# Patient Record
Sex: FEMALE | Race: BLACK OR AFRICAN AMERICAN | NOT HISPANIC OR LATINO | Employment: FULL TIME
[De-identification: names, ages, dates, MRNs, and addresses within clinical notes are randomized per-mention and may not be internally consistent; named-entity substitution may affect disease eponyms.]

---

## 2017-07-29 ENCOUNTER — HEALTH MAINTENANCE LETTER (OUTPATIENT)
Age: 42
End: 2017-07-29

## 2017-08-04 ENCOUNTER — OFFICE VISIT (OUTPATIENT)
Dept: FAMILY MEDICINE | Facility: CLINIC | Age: 42
End: 2017-08-04
Payer: COMMERCIAL

## 2017-08-04 ENCOUNTER — RESULT FOLLOW UP (OUTPATIENT)
Dept: FAMILY MEDICINE | Facility: CLINIC | Age: 42
End: 2017-08-04

## 2017-08-04 VITALS
SYSTOLIC BLOOD PRESSURE: 115 MMHG | TEMPERATURE: 97 F | DIASTOLIC BLOOD PRESSURE: 76 MMHG | HEART RATE: 87 BPM | BODY MASS INDEX: 30.95 KG/M2 | HEIGHT: 66 IN | WEIGHT: 192.6 LBS | OXYGEN SATURATION: 99 %

## 2017-08-04 DIAGNOSIS — N76.0 BV (BACTERIAL VAGINOSIS): ICD-10-CM

## 2017-08-04 DIAGNOSIS — Z11.3 SCREEN FOR STD (SEXUALLY TRANSMITTED DISEASE): ICD-10-CM

## 2017-08-04 DIAGNOSIS — B96.89 BV (BACTERIAL VAGINOSIS): ICD-10-CM

## 2017-08-04 DIAGNOSIS — R82.90 ABNORMAL URINE ODOR: ICD-10-CM

## 2017-08-04 DIAGNOSIS — Z12.4 SCREENING FOR MALIGNANT NEOPLASM OF CERVIX: Primary | ICD-10-CM

## 2017-08-04 DIAGNOSIS — N92.6 MISSED MENSES: ICD-10-CM

## 2017-08-04 DIAGNOSIS — Z13.6 CARDIOVASCULAR SCREENING; LDL GOAL LESS THAN 160: ICD-10-CM

## 2017-08-04 DIAGNOSIS — Z00.00 ROUTINE HISTORY AND PHYSICAL EXAMINATION OF ADULT: ICD-10-CM

## 2017-08-04 LAB
ALBUMIN UR-MCNC: NEGATIVE MG/DL
APPEARANCE UR: CLEAR
BACTERIA #/AREA URNS HPF: ABNORMAL /HPF
BETA HCG QUAL IFA URINE: NEGATIVE
BILIRUB UR QL STRIP: NEGATIVE
COLOR UR AUTO: YELLOW
GLUCOSE UR STRIP-MCNC: NEGATIVE MG/DL
HGB UR QL STRIP: ABNORMAL
KETONES UR STRIP-MCNC: NEGATIVE MG/DL
LEUKOCYTE ESTERASE UR QL STRIP: NEGATIVE
MICRO REPORT STATUS: ABNORMAL
NITRATE UR QL: NEGATIVE
NON-SQ EPI CELLS #/AREA URNS LPF: ABNORMAL /LPF
PH UR STRIP: 6.5 PH (ref 5–7)
RBC #/AREA URNS AUTO: ABNORMAL /HPF (ref 0–2)
SP GR UR STRIP: 1.01 (ref 1–1.03)
SPECIMEN SOURCE: ABNORMAL
URN SPEC COLLECT METH UR: ABNORMAL
UROBILINOGEN UR STRIP-ACNC: 0.2 EU/DL (ref 0.2–1)
WBC #/AREA URNS AUTO: ABNORMAL /HPF (ref 0–2)
WET PREP SPEC: ABNORMAL

## 2017-08-04 PROCEDURE — 99212 OFFICE O/P EST SF 10 MIN: CPT | Mod: 25 | Performed by: NURSE PRACTITIONER

## 2017-08-04 PROCEDURE — 99396 PREV VISIT EST AGE 40-64: CPT | Performed by: NURSE PRACTITIONER

## 2017-08-04 PROCEDURE — G0145 SCR C/V CYTO,THINLAYER,RESCR: HCPCS | Performed by: NURSE PRACTITIONER

## 2017-08-04 PROCEDURE — 87591 N.GONORRHOEAE DNA AMP PROB: CPT | Performed by: NURSE PRACTITIONER

## 2017-08-04 PROCEDURE — 87491 CHLMYD TRACH DNA AMP PROBE: CPT | Performed by: NURSE PRACTITIONER

## 2017-08-04 PROCEDURE — 84703 CHORIONIC GONADOTROPIN ASSAY: CPT | Performed by: NURSE PRACTITIONER

## 2017-08-04 PROCEDURE — 87210 SMEAR WET MOUNT SALINE/INK: CPT | Performed by: NURSE PRACTITIONER

## 2017-08-04 PROCEDURE — 87624 HPV HI-RISK TYP POOLED RSLT: CPT | Performed by: NURSE PRACTITIONER

## 2017-08-04 PROCEDURE — 81001 URINALYSIS AUTO W/SCOPE: CPT | Performed by: NURSE PRACTITIONER

## 2017-08-04 RX ORDER — METRONIDAZOLE 500 MG/1
500 TABLET ORAL 2 TIMES DAILY
Qty: 14 TABLET | Refills: 0 | Status: SHIPPED | OUTPATIENT
Start: 2017-08-04 | End: 2018-12-21

## 2017-08-04 NOTE — PATIENT INSTRUCTIONS
Based on your medical history and these are the current health maintenance or preventive care services that you are due for (some may have been done at this visit)  Health Maintenance Due   Topic Date Due     PAP Q3 YR  02/20/2017         At [facility], we strive to deliver an exceptional experience to you, every time we see you.    If you receive a survey in the mail, please send us back your thoughts. We really do value your feedback.    Your care team's suggested websites for health information:  Www.[facility].org : Up to date and easily searchable information on multiple topics.  Www.medlineplus.gov : medication info, interactive tutorials, watch real surgeries online  Www.familydoctor.org : good info from the Academy of Family Physicians  Www.cdc.gov : public health info, travel advisories, epidemics (H1N1)  Www.aap.org : children's health info, normal development, vaccinations  Www.health.[facility].[place].us : [place] dept of health, public health issues in [place], N1N1    How to contact your care team:   [facility] [phone]         Pharmacy [phone]    Dr. Rodarte, Oliva Barrow PA-C, Dr. Jimenez, Ani LEVINE CNP, Luanne Ross PA-C, Dr. Castellanos, and ABNER Styles CNP    Team RNs: Mandy Morales      Clinic hours  M-Th 7 am-7 pm   Fri 7 am-5 pm.   Urgent care M-F 11 am-9 pm,   Sat/Sun 9 am-5 pm.  Pharmacy M-Th 8 am-8 pm Fri 8 am-6 pm  Sat/Sun 9 am-5 pm.     All password changes, disabled accounts, or ID changes in [email or web address] will be done by our Access Services Department.    If you need help with your account or password, call: [phone]. Clinic staff no longer has the ability to change passwords.     Preventive Health Recommendations  Female Ages 40 to 49    Yearly exam:     See your health care provider every year in order to  1. Review health changes.   2. Discuss preventive care.    3. Review your medicines if your doctor prescribed any.      Get a  Pap test every three years (unless you have an abnormal result and your provider advises testing more often).      If you get Pap tests with HPV test, you only need to test every 5 years, unless you have an abnormal result. You do not need a Pap test if your uterus was removed (hysterectomy) and you have not had cancer.      You should be tested each year for STDs (sexually transmitted diseases), if you're at risk.       Ask your doctor if you should have a mammogram.      Have a colonoscopy (test for colon cancer) if someone in your family has had colon cancer or polyps before age 50.       Have a cholesterol test every 5 years.       Have a diabetes test (fasting glucose) after age 45. If you are at risk for diabetes, you should have this test every 3 years.    Shots: Get a flu shot each year. Get a tetanus shot every 10 years.     Nutrition:     Eat at least 5 servings of fruits and vegetables each day.    Eat whole-grain bread, whole-wheat pasta and brown rice instead of white grains and rice.    Talk to your provider about Calcium and Vitamin D.     Lifestyle    Exercise at least 150 minutes a week (an average of 30 minutes a day, 5 days a week). This will help you control your weight and prevent disease.    Limit alcohol to one drink per day.    No smoking.     Wear sunscreen to prevent skin cancer.    See your dentist every six months for an exam and cleaning.

## 2017-08-04 NOTE — LETTER
July 23, 2018      Berhane Murray  [address] [place] [address]    Dear MsTristonTrevor,      At [facility], your health and wellness is our primary concern. That is why we are following up on a positive high risk HPV test from 8/4/17. Your provider had recommended that you have a Pap smear and HPV test completed by 8/4/18. Our records do not show that this h17as been scheduled.    It is important to complete the follow up that your provider has suggested for you to ensure that there are no worsening changes which may, over time, develop into cancer.      Please contact our office at  [phone] to schedule an appointment for a Pap smear and HPV test at your earliest convenience. If you have questions or concerns, please call the clinic and we will be happy to assist you.    If you have completed the tests outside of [facility], please have the results forwarded to our office. We will update the chart for your primary Physician to review before your next annual physical.     Thank you for choosing [facility]!    Sincerely,      ABNER Molina CNP/rlm

## 2017-08-04 NOTE — MR AVS SNAPSHOT
After Visit Summary   8/4/2017    Berhane Murray    MRN: [id number]           Patient Information     Date Of Birth          1975        Visit Information        Provider Department      8/4/2017 10:20 AM Estefanía Gregory APRN CNP [facility]        Today's Diagnoses     Screening for malignant neoplasm of cervix    -  1    Routine history and physical examination of adult        Abnormal urine odor        Missed menses        Body mass index 33.0-33.9, adult        Screen for STD (sexually transmitted disease)        CARDIOVASCULAR SCREENING; LDL GOAL LESS THAN 160          Care Instructions    Based on your medical history and these are the current health maintenance or preventive care services that you are due for (some may have been done at this visit)  Health Maintenance Due   Topic Date Due     PAP Q3 YR  02/20/2017         At [facility], we strive to deliver an exceptional experience to you, every time we see you.    If you receive a survey in the mail, please send us back your thoughts. We really do value your feedback.    Your care team's suggested websites for health information:  Www.[employer].org : Up to date and easily searchable information on multiple topics.  Www.medlineplus.gov : medication info, interactive tutorials, watch real surgeries online  Www.familydoctor.org : good info from the Academy of Family Physicians  Www.cdc.gov : public health info, travel advisories, epidemics (H1N1)  Www.aap.org : children's health info, normal development, vaccinations  Www.health.state[place]us : [place] dept of health, public health issues in [place], N1N1    How to contact your care team:   [facility] [phone]         Pharmacy [phone]    Dr. Rodarte, Oliva Barrow PA-C, Dr. Jimenez, Ani LEVINE CNP, Luanne Ross PA-C, Dr. Castellanos, and ABNER Styles CNP    Team RNs: Mandy Morales      Clinic hours  M-Th 7 am-7 pm   Fri 7  am-5 pm.   Urgent care M-F 11 am-9 pm,   Sat/Sun 9 am-5 pm.  Pharmacy M-Th 8 am-8 pm Fri 8 am-6 pm  Sat/Sun 9 am-5 pm.     All password changes, disabled accounts, or ID changes in [email or web address] will be done by our Access Services Department.    If you need help with your account or password, call: [phone]. Clinic staff no longer has the ability to change passwords.     Preventive Health Recommendations  Female Ages 40 to 49    Yearly exam:     See your health care provider every year in order to  1. Review health changes.   2. Discuss preventive care.    3. Review your medicines if your doctor prescribed any.      Get a Pap test every three years (unless you have an abnormal result and your provider advises testing more often).      If you get Pap tests with HPV test, you only need to test every 5 years, unless you have an abnormal result. You do not need a Pap test if your uterus was removed (hysterectomy) and you have not had cancer.      You should be tested each year for STDs (sexually transmitted diseases), if you're at risk.       Ask your doctor if you should have a mammogram.      Have a colonoscopy (test for colon cancer) if someone in your family has had colon cancer or polyps before age 50.       Have a cholesterol test every 5 years.       Have a diabetes test (fasting glucose) after age 45. If you are at risk for diabetes, you should have this test every 3 years.    Shots: Get a flu shot each year. Get a tetanus shot every 10 years.     Nutrition:     Eat at least 5 servings of fruits and vegetables each day.    Eat whole-grain bread, whole-wheat pasta and brown rice instead of white grains and rice.    Talk to your provider about Calcium and Vitamin D.     Lifestyle    Exercise at least 150 minutes a week (an average of 30 minutes a day, 5 days a week). This will help you control your weight and prevent disease.    Limit alcohol to one drink per day.    No smoking.     Wear sunscreen to  prevent skin cancer.    See your dentist every six months for an exam and cleaning.          Follow-ups after your visit        Additional Services     Can Do Program Referral       CAN DO is different from other healthy lifestyle and weight loss programs. CAN DO personal health coaches support you in clarifying what you want for yourself and your well-being. Together we create a roadmap to guide your journey to thriving well-being   one step at a time   for lasting success. Working under the leadership of a physician medical director, our health coaching team includes a doctorate -level prepared expert in health management, a physician and an exercise specialist. We support you in identifying what is important to you, setting meaningful goals and addressing barriers to success. Together we nehal your progress over time. CAN DO has helped hundreds reach their goals: from people who just want to be healthier and enjoy more energy, to those who want to lose or gain weight.                  Your next 10 appointments already scheduled     Aug 18, 2017  2:15 PM CDT   MA SCREENING DIGITAL BILATERAL with [id number]   [facility]    [address] [place] [address] [phone]           Do not use any powder, lotion or deodorant under your arms or on your breast. If you do, we will ask you to remove it before your exam.  Wear comfortable, two-piece clothing.  If you have any allergies, tell your care team.  Bring any previous mammograms from other facilities or have them mailed to the breast center.              Future tests that were ordered for you today     Open Future Orders        Priority Expected Expires Ordered    Glucose Routine  9/4/2017 8/4/2017    Lipid Profile (Chol, Trig, HDL, LDL calc) Routine  9/4/2017 8/4/2017            Who to contact     If you have questions or need follow up information about today's clinic visit or your schedule  "please contact [facility] [place] directly at [phone].  Normal or non-critical lab and imaging results will be communicated to you by MyChart, letter or phone within 4 business days after the clinic has received the results. If you do not hear from us within 7 days, please contact the clinic through MyChart or phone. If you have a critical or abnormal lab result, we will notify you by phone as soon as possible.  Submit refill requests through [employer] or call your pharmacy and they will forward the refill request to us. Please allow 3 business days for your refill to be completed.          Additional Information About Your Visit        [employer] Information     [employer] lets you send messages to your doctor, view your test results, renew your prescriptions, schedule appointments and more. To sign up, go to [email or web address] . Click on \"Log in\" on the left side of the screen, which will take you to the Welcome page. Then click on \"Sign up Now\" on the right side of the page.     You will be asked to enter the access code listed below, as well as some personal information. Please follow the directions to create your username and password.     Your access code is: [email or web address]  Expires: 2017 11:05 AM     Your access code will  in 90 days. If you need help or a new code, please call your [facility] clinic or [phone].        Care EveryWhere ID     This is your Care EveryWhere ID. This could be used by other organizations to access your [facility] medical records  [id number]        Your Vitals Were     Pulse Temperature Height Last Period Pulse Oximetry BMI (Body Mass Index)    87 97  F (36.1  C) (Oral) 5' 5.57\" (1.665 m) 2017 99% 31.5 kg/m2       Blood Pressure from Last 3 Encounters:   17 115/76   16 126/70   14 122/75    Weight from Last 3 Encounters:   17 192 lb 9.6 oz (87.4 kg)   16 195 lb 3.2 oz (88.5 kg)   14 188 lb (85.3 kg)              We " Performed the Following     Beta HCG qual IFA urine     Can Do Program Referral     CHLAMYDIA TRACHOMATIS PCR     NEISSERIA GONORRHOEA PCR     Pap imaged thin layer screen with HPV - recommended age 30 - 65 years (select HPV order below)     UA reflex to Microscopic and Culture     Wet prep        Primary Care Provider Office Phone # Fax #    ABNER Lisa [phone]       [facility] [address]  [facility] [address]        Equal Access to Services     AMIRA VALERO : Hadii aad ku hadasho Soomaali, waaxda luqadaha, qaybta kaalmada adeegyada, waxay idiin hayaan adeeg kharash la'barrera . So [facility] [phone].    ATENCIÓN: Si roderickla español, tiene a lal disposición servicios gratuitos de asistencia lingüística. Llame al [phone].    We comply with applicable federal civil rights laws and [place] laws. We do not discriminate on the basis of race, color, national origin, age, disability sex, sexual orientation or gender identity.            Thank you!     Thank you for choosing [facility]  for your care. Our goal is always to provide you with excellent care. Hearing back from our patients is one way we can continue to improve our services. Please take a few minutes to complete the written survey that you may receive in the mail after your visit with us. Thank you!             Your Updated Medication List - Protect others around you: Learn how to safely use, store and throw away your medicines at www.disposemymeds.org.          This list is accurate as of: 8/4/17 11:05 AM.  Always use your most recent med list.                   Brand Name Dispense Instructions for use Diagnosis    albuterol 108 (90 BASE) MCG/ACT Inhaler    PROAIR HFA/PROVENTIL HFA/VENTOLIN HFA    1 Inhaler    Inhale 2 puffs into the lungs every 6 hours as needed for shortness of breath / dyspnea or wheezing    Bronchitis with bronchospasm

## 2017-08-04 NOTE — NURSING NOTE
"Chief Complaint   Patient presents with     Physical     Not fasting       Initial /76 (BP Location: Left arm, Patient Position: Sitting, Cuff Size: Adult Large)  Pulse 87  Temp 97  F (36.1  C) (Oral)  Ht 5' 5.57\" (1.665 m)  Wt 192 lb 9.6 oz (87.4 kg)  LMP 06/13/2017  SpO2 99%  BMI 31.5 kg/m2 Estimated body mass index is 31.5 kg/(m^2) as calculated from the following:    Height as of this encounter: 5' 5.57\" (1.665 m).    Weight as of this encounter: 192 lb 9.6 oz (87.4 kg).  Medication Reconciliation: complete   Charisse Mitchell MA      "

## 2017-08-04 NOTE — LETTER
[address] [address]  [phone]                                                                                                        August 7, 2017      Berhane Murray  [address] [place] [address]            Dear Berhane,    Your Gonorrhea and Chlamydia screens were both negative.  Continue to use condoms  to help prevent sexually transmitted diseases.  Feel free to contact me with any questions or concerns.  Thank you for allowing me to participate in your care.    Estefanía Gregory  APRN, CNP/ak        Results for orders placed or performed in visit on 08/04/17   Beta HCG qual IFA urine   Result Value Ref Range    Beta HCG Qual IFA Urine Negative NEG   UA reflex to Microscopic and Culture   Result Value Ref Range    Color Urine Yellow     Appearance Urine Clear     Glucose Urine Negative NEG mg/dL    Bilirubin Urine Negative NEG    Ketones Urine Negative NEG mg/dL    Specific Gravity Urine 1.010 1.003 - 1.035    Blood Urine Trace (A) NEG    pH Urine 6.5 5.0 - 7.0 pH    Protein Albumin Urine Negative NEG mg/dL    Urobilinogen Urine 0.2 0.2 - 1.0 EU/dL    Nitrite Urine Negative NEG    Leukocyte Esterase Urine Negative NEG    Source Midstream Urine    Urine Microscopic   Result Value Ref Range    WBC Urine O - 2 0 - 2 /HPF    RBC Urine O - 2 0 - 2 /HPF    Squamous Epithelial /LPF Urine Few FEW /LPF    Bacteria Urine Moderate (A) NEG /HPF   Wet prep   Result Value Ref Range    Specimen Description Vagina     Wet Prep (A)      Clue cells seen  No Trichomonas seen  No yeast seen      Micro Report Status FINAL 08/04/2017    NEISSERIA GONORRHOEA PCR   Result Value Ref Range    Specimen Descrip Cervix     N Gonorrhea PCR  NEG     Negative   Negative for N. gonorrhoeae rRNA by transcription mediated amplification.   A negative result by transcription mediated amplification does not preclude the   presence of N. gonorrhoeae infection because  results are dependent on proper   and adequate collection, absence of inhibitors, and sufficient rRNA to be   detected.     CHLAMYDIA TRACHOMATIS PCR   Result Value Ref Range    Specimen Description Cervix     Chlamydia Trachomatis PCR  NEG     Negative   Negative for C. trachomatis rRNA by transcription mediated amplification.   A negative result by transcription mediated amplification does not preclude the   presence of C. trachomatis infection because results are dependent on proper   and adequate collection, absence of inhibitors, and sufficient rRNA to be   detected.

## 2017-08-04 NOTE — PROGRESS NOTES
SUBJECTIVE:   CC: Berhane Murray is an 42 year old woman who presents for preventive health visit.     Healthy Habits:    Do you get at least three servings of calcium containing foods daily (dairy, green leafy vegetables, etc.)? yes    Amount of exercise or daily activities, outside of work: 7 day(s) per week    Problems taking medications regularly No    Medication side effects: No    Have you had an eye exam in the past two years? no    Do you see a dentist twice per year? yes    Do you have sleep apnea, excessive snoring or daytime drowsiness?no      Concern: Personal - LAST MENSTUAL PERIOD 6/13/17, no menses since then, has done to home HCG's both of which were negative.  Menses have otherwise been normal lasting 4-5 days.   Not using contraception. No abnormal pap history.  Patient notes strong urine odor when voiding, no frequency, urgency, dysuria, no abdominal or flank pain, no fever or chills, also notes some sour smelling vaginal discharge, no dyspareunia, no STD history.    Today's PHQ-2 Score: PHQ-2 ( 1999 Pf[employer]) 8/4/2017 1/20/2016   Q1: Little interest or pleasure in doing things 0 0   Q2: Feeling down, depressed or hopeless 0 0   PHQ-2 Score 0 0         Abuse: Current or Past(Physical, Sexual or Emotional)- No  Do you feel safe in your environment - Yes  Social History   Substance Use Topics     Smoking status: Never Smoker     Smokeless tobacco: Never Used     Alcohol use Yes     The patient does not drink >3 drinks per day nor >7 drinks per week.    Reviewed orders with patient.  Reviewed health maintenance and updated orders accordingly - Yes  Allergies   Allergen Reactions     Prednisone      Fainted after competing full dose.      Recent Labs   Lab Test  02/20/14   1520   ALT  38   CR  0.64   GFRESTIMATED  >90   GFRESTBLACK  >90   POTASSIUM  3.6          Patient under age 50, mutual decision reflected in health maintenance.        Pertinent mammograms are reviewed under the imaging tab.  History  "of abnormal Pap smear: NO - age 30- 65 PAP every 3 years recommended    Reviewed and updated as needed this visit by clinical staffTobacco  Allergies  Meds  Med Hx  Surg Hx  Fam Hx  Soc Hx        Reviewed and updated as needed this visit by Provider        History reviewed. No pertinent past medical history.   History reviewed. No pertinent surgical history.    ROS:  C: NEGATIVE for fever, chills, change in weight  I: NEGATIVE for worrisome rashes, moles or lesions  E: NEGATIVE for vision changes or irritation  ENT: NEGATIVE for ear, mouth and throat problems  R: NEGATIVE for significant cough or SOB  B: NEGATIVE for masses, tenderness or discharge  CV: NEGATIVE for chest pain, palpitations or peripheral edema  GI: NEGATIVE for nausea, abdominal pain, heartburn, or change in bowel habits  : NEGATIVE for unusual urinary or vaginal symptoms. Periods are regular.  M: NEGATIVE for significant arthralgias or myalgia  N: NEGATIVE for weakness, dizziness or paresthesias  P: NEGATIVE for changes in mood or affect    OBJECTIVE:   /76 (BP Location: Left arm, Patient Position: Sitting, Cuff Size: Adult Large)  Pulse 87  Temp 97  F (36.1  C) (Oral)  Ht 5' 5.57\" (1.665 m)  Wt 192 lb 9.6 oz (87.4 kg)  LMP 06/13/2017  SpO2 99%  BMI 31.5 kg/m2  EXAM:  GENERAL: healthy, alert and no distress  EYES: Eyes grossly normal to inspection, PERRL and conjunctivae and sclerae normal  HENT: ear canals and TM's normal, nose and mouth without ulcers or lesions  NECK: no adenopathy, no asymmetry, masses, or scars and thyroid normal to palpation  RESP: lungs clear to auscultation - no rales, rhonchi or wheezes  BREAST: normal without masses, tenderness or nipple discharge and no palpable axillary masses or adenopathy  CV: regular rate and rhythm, normal S1 S2, no S3 or S4, no murmur, click or rub, no peripheral edema and peripheral pulses strong  ABDOMEN: soft, nontender, no hepatosplenomegaly, no masses and bowel sounds " normal   (female): normal female external genitalia, normal urethral meatus , vaginal mucosa pink, moist, well rugated and normal cervix, adnexae, and uterus without masses.  MS: no gross musculoskeletal defects noted, no edema  SKIN: no suspicious lesions or rashes  NEURO: Normal strength and tone, mentation intact and speech normal  PSYCH: mentation appears normal, affect normal/bright  LYMPH: no cervical, supraclavicular, axillary, or inguinal adenopathy    ASSESSMENT/PLAN:   1. Routine history and physical examination of adult    - Glucose; Future    2. Screening for malignant neoplasm of cervix    - Pap imaged thin layer screen with HPV - recommended age 30 - 65 years (select HPV order below)  - Urine Microscopic    3. Missed menses  HCG is negative  - Beta HCG qual IFA urine    4. Abnormal urine odor    - UA reflex to Microscopic and Culture    5. Body mass index 33.0-33.9, adult  Benefits of weight loss reviewed in detail, encouraged her to cut back on the carbohydrates in the diet, consume more fruits and vegetables, drink plenty of water, avoid fruit juices, sodas, get 150 min moderate exercise/week.  Recheck weight in 6 months.    - Can Do Program Referral    6. Screen for STD (sexually transmitted disease)    - Wet prep  - NEISSERIA GONORRHOEA PCR  - CHLAMYDIA TRACHOMATIS PCR    7. CARDIOVASCULAR SCREENING; LDL GOAL LESS THAN 160    - Lipid Profile (Chol, Trig, HDL, LDL calc); Future    8. BV (bacterial vaginosis)    Medication started today, see epic for orders.  Patient is to avoid alcohol while on Flagyl.  We briefly discussed the pathophysiology of bacterial vaginosis and outlined the expected course, the warning symptoms and signs that indicate an atypical course that would need urgent follow up.  Patient education materials given during examination today.    - metroNIDAZOLE (FLAGYL) 500 MG tablet; Take 1 tablet (500 mg) by mouth 2 times daily  Dispense: 14 tablet; Refill: 0    COUNSELING:  "  Reviewed preventive health counseling, as reflected in patient instructions       Regular exercise       Healthy diet/nutrition       Vision screening       Contraception       Osteoporosis Prevention/Bone Health       Safe sex practices/STD prevention       (Greta)menopause management   reports that she has never smoked. She has never used smokeless tobacco.    Estimated body mass index is 31.5 kg/(m^2) as calculated from the following:    Height as of this encounter: 5' 5.57\" (1.665 m).    Weight as of this encounter: 192 lb 9.6 oz (87.4 kg).   Weight management plan: Discussed healthy diet and exercise guidelines and patient will follow up in 12 months in clinic to re-evaluate.    Counseling Resources:  ATP IV Guidelines  Pooled Cohorts Equation Calculator  Breast Cancer Risk Calculator  FRAX Risk Assessment  ICSI Preventive Guidelines  Dietary Guidelines for Americans, 2010  USDA's MyPlate  ASA Prophylaxis  Lung CA Screening    ABNER [facility]  "

## 2017-08-06 LAB
C TRACH DNA SPEC QL NAA+PROBE: NORMAL
N GONORRHOEA DNA SPEC QL NAA+PROBE: NORMAL
SPECIMEN SOURCE: NORMAL
SPECIMEN SOURCE: NORMAL

## 2017-08-08 DIAGNOSIS — Z00.00 ROUTINE HISTORY AND PHYSICAL EXAMINATION OF ADULT: ICD-10-CM

## 2017-08-08 DIAGNOSIS — Z13.6 CARDIOVASCULAR SCREENING; LDL GOAL LESS THAN 160: ICD-10-CM

## 2017-08-08 LAB
CHOLEST SERPL-MCNC: 168 MG/DL
GLUCOSE SERPL-MCNC: 93 MG/DL (ref 70–99)
HDLC SERPL-MCNC: 49 MG/DL
LDLC SERPL CALC-MCNC: 109 MG/DL
NONHDLC SERPL-MCNC: 119 MG/DL
TRIGL SERPL-MCNC: 48 MG/DL

## 2017-08-08 PROCEDURE — 82947 ASSAY GLUCOSE BLOOD QUANT: CPT | Performed by: NURSE PRACTITIONER

## 2017-08-08 PROCEDURE — 80061 LIPID PANEL: CPT | Performed by: NURSE PRACTITIONER

## 2017-08-08 PROCEDURE — 36415 COLL VENOUS BLD VENIPUNCTURE: CPT | Performed by: NURSE PRACTITIONER

## 2017-08-09 ENCOUNTER — NURSE TRIAGE (OUTPATIENT)
Dept: NURSING | Facility: CLINIC | Age: 42
End: 2017-08-09

## 2017-08-09 ENCOUNTER — TELEPHONE (OUTPATIENT)
Dept: NURSING | Facility: CLINIC | Age: 42
End: 2017-08-09

## 2017-08-09 DIAGNOSIS — N92.6 MISSED MENSES: Primary | ICD-10-CM

## 2017-08-09 LAB
COPATH REPORT: NORMAL
PAP: NORMAL

## 2017-08-10 NOTE — TELEPHONE ENCOUNTER
Pt would like lab results.  Notified patient that results will take longer than 24 hours.  Once the MD reviews the labs then they should be accessible in My Chart.  Please call her with lab results.  [phone]  Routed to .    Tita Pike RN  [facility]

## 2017-08-10 NOTE — TELEPHONE ENCOUNTER
Result letter mailed out to pt on 08/09/2017. Called and notified pt of normal lab results.  Pt's concerned about her missed menstrual cycle for two months, she wants to know th next step since all of her labs came back negative. Should she wait it through until September to see if she gets a menstrual cycle/ is it pre-menopausal?  Routing to provider to advise.  Charisse Mitchell MA

## 2017-08-10 NOTE — TELEPHONE ENCOUNTER
Notified pt on providers note below, she requested to speak with provider directly because she has some questions.  Routing to provider to advise.  Charisse Mitchell MA

## 2017-08-10 NOTE — TELEPHONE ENCOUNTER
Pt unable to see results in my chart.  Notified it will take longer than 24 hours.  Routed to .    Tita Pike RN  [facility]

## 2017-08-10 NOTE — TELEPHONE ENCOUNTER
If no menses  Through September, we'll cycle her with some Provera to make her have a cycle.  Estefanía LEVINE, CNP

## 2017-08-11 LAB
FINAL DIAGNOSIS: ABNORMAL
HPV HR 12 DNA CVX QL NAA+PROBE: POSITIVE
HPV16 DNA SPEC QL NAA+PROBE: NEGATIVE
HPV18 DNA SPEC QL NAA+PROBE: NEGATIVE
SPECIMEN DESCRIPTION: ABNORMAL

## 2017-08-14 NOTE — TELEPHONE ENCOUNTER
..Reason for Call:  Other     Detailed comments: Patient called she said she thought she was also having an Thyroid test. She said she has no test results on mychart for thyroid.    Phone Number Patient can be reached at: [phone] (home)    Best Time: anytime    Can we leave a detailed message on this number? YES    Call taken on 8/14/2017 at 12:03 PM by Amairani Fernandez

## 2017-08-14 NOTE — PROGRESS NOTES
08/04/17: NIL pap, + HR HPV (not 16 or 18) result. Plan cotest in 1 year. [email or web address] message sent to the pt with the results and recommendations.   7/23/18 Cotest reminder letter sent ([facility])  12/21/18 NIL pap, neg HPV. Plan: cotest 3 years ([facility])

## 2017-08-15 NOTE — TELEPHONE ENCOUNTER
"Patient did  Not have TSH drawn at last visit ans wants it as hse has not had menses since mid June.  We did HCG on 8/4 and she did 2 home hcg tests priorto her OV 8/4/17 all of which were negative.  Using \"withdrawal method\" for contraception.  TSH and repeat HCG ordered (future).  Patient plans on coming in tomorrow for  Labs and requests a call with pregnancy result.  Estefanía LEVINE, CNP    "

## 2017-08-15 NOTE — TELEPHONE ENCOUNTER
This writer attempted to contact Berhane on 08/15/17      Reason for call of lab orders place and left detailed message.      When patient calls back, please schedule Lab appointment within 2 business days with lab, document that pt called and close encounter .          Harvey Murray, CMA

## 2017-08-16 NOTE — TELEPHONE ENCOUNTER
This writer attempted to contact Berhane on 08/16/17      Reason for call of lab orders place and left detailed message, to call and schedule a lab appointment.      When patient calls back, please schedule Lab appointment within 2 business days with lab, document that pt called and close encounter .      Nicole Wright CMA

## 2017-08-21 DIAGNOSIS — N92.6 MISSED MENSES: ICD-10-CM

## 2017-08-21 LAB
BETA HCG QUAL IFA URINE: NEGATIVE
TSH SERPL DL<=0.005 MIU/L-ACNC: 0.49 MU/L (ref 0.4–4)

## 2017-08-21 PROCEDURE — 36415 COLL VENOUS BLD VENIPUNCTURE: CPT | Performed by: NURSE PRACTITIONER

## 2017-08-21 PROCEDURE — 84443 ASSAY THYROID STIM HORMONE: CPT | Performed by: NURSE PRACTITIONER

## 2017-08-21 PROCEDURE — 84703 CHORIONIC GONADOTROPIN ASSAY: CPT | Performed by: NURSE PRACTITIONER

## 2018-09-09 ENCOUNTER — HEALTH MAINTENANCE LETTER (OUTPATIENT)
Age: 43
End: 2018-09-09

## 2018-09-22 ENCOUNTER — RADIANT APPOINTMENT (OUTPATIENT)
Dept: MAMMOGRAPHY | Facility: CLINIC | Age: 43
End: 2018-09-22
Payer: COMMERCIAL

## 2018-09-22 DIAGNOSIS — Z12.31 VISIT FOR SCREENING MAMMOGRAM: ICD-10-CM

## 2018-09-22 PROCEDURE — 77067 SCR MAMMO BI INCL CAD: CPT | Mod: TC

## 2018-10-07 ENCOUNTER — HEALTH MAINTENANCE LETTER (OUTPATIENT)
Age: 43
End: 2018-10-07

## 2018-10-10 ENCOUNTER — TELEPHONE (OUTPATIENT)
Dept: FAMILY MEDICINE | Facility: CLINIC | Age: 43
End: 2018-10-10

## 2018-10-10 NOTE — TELEPHONE ENCOUNTER
..Reason for Call:  Request for results:    Name of test or procedure: mammogram    Date of test of procedure: 09-    Location of the test or procedure: [facility]    OK to leave the result message on voice mail or with a family member? YES    Phone number Patient can be reached at:  Home number on file [phone] (home)    Additional comments: none    Call taken on 10/10/2018 at 10:18 AM by Jessica Gavin

## 2018-10-10 NOTE — TELEPHONE ENCOUNTER
Pls call tp with the following message:      Your mammogram was normal for you.  You should receive a letter on this soon. It was mailed out today.    Estefanía LEVINE, CNP

## 2018-10-10 NOTE — TELEPHONE ENCOUNTER
This writer attempted to contact patient on 10/10/18      Reason for call results and left detailed message.        Vannessa Jones MA

## 2018-12-21 ENCOUNTER — OFFICE VISIT (OUTPATIENT)
Dept: FAMILY MEDICINE | Facility: CLINIC | Age: 43
End: 2018-12-21
Payer: COMMERCIAL

## 2018-12-21 VITALS
HEIGHT: 63 IN | DIASTOLIC BLOOD PRESSURE: 82 MMHG | TEMPERATURE: 98.4 F | WEIGHT: 200.9 LBS | OXYGEN SATURATION: 100 % | HEART RATE: 65 BPM | BODY MASS INDEX: 35.6 KG/M2 | SYSTOLIC BLOOD PRESSURE: 124 MMHG

## 2018-12-21 DIAGNOSIS — N89.8 VAGINAL ODOR: ICD-10-CM

## 2018-12-21 DIAGNOSIS — R87.810 CERVICAL HIGH RISK HPV (HUMAN PAPILLOMAVIRUS) TEST POSITIVE: ICD-10-CM

## 2018-12-21 DIAGNOSIS — Z00.00 ENCOUNTER FOR ROUTINE ADULT HEALTH EXAMINATION WITHOUT ABNORMAL FINDINGS: Primary | ICD-10-CM

## 2018-12-21 DIAGNOSIS — N76.0 BV (BACTERIAL VAGINOSIS): ICD-10-CM

## 2018-12-21 DIAGNOSIS — B96.89 BV (BACTERIAL VAGINOSIS): ICD-10-CM

## 2018-12-21 DIAGNOSIS — N93.8 DUB (DYSFUNCTIONAL UTERINE BLEEDING): ICD-10-CM

## 2018-12-21 DIAGNOSIS — N95.1 MENOPAUSAL SYNDROME (HOT FLASHES): ICD-10-CM

## 2018-12-21 DIAGNOSIS — Z83.3 FAMILY HISTORY OF DIABETES MELLITUS: ICD-10-CM

## 2018-12-21 LAB
BETA HCG QUAL IFA URINE: NEGATIVE
HBA1C MFR BLD: 5.6 % (ref 0–5.6)
SPECIMEN SOURCE: ABNORMAL
WET PREP SPEC: ABNORMAL

## 2018-12-21 PROCEDURE — 36415 COLL VENOUS BLD VENIPUNCTURE: CPT | Performed by: FAMILY MEDICINE

## 2018-12-21 PROCEDURE — 83001 ASSAY OF GONADOTROPIN (FSH): CPT | Performed by: FAMILY MEDICINE

## 2018-12-21 PROCEDURE — 87624 HPV HI-RISK TYP POOLED RSLT: CPT | Performed by: FAMILY MEDICINE

## 2018-12-21 PROCEDURE — 99213 OFFICE O/P EST LOW 20 MIN: CPT | Mod: 25 | Performed by: FAMILY MEDICINE

## 2018-12-21 PROCEDURE — 87210 SMEAR WET MOUNT SALINE/INK: CPT | Performed by: FAMILY MEDICINE

## 2018-12-21 PROCEDURE — G0145 SCR C/V CYTO,THINLAYER,RESCR: HCPCS | Performed by: FAMILY MEDICINE

## 2018-12-21 PROCEDURE — 80061 LIPID PANEL: CPT | Performed by: FAMILY MEDICINE

## 2018-12-21 PROCEDURE — 99396 PREV VISIT EST AGE 40-64: CPT | Performed by: FAMILY MEDICINE

## 2018-12-21 PROCEDURE — 84703 CHORIONIC GONADOTROPIN ASSAY: CPT | Performed by: FAMILY MEDICINE

## 2018-12-21 PROCEDURE — 83036 HEMOGLOBIN GLYCOSYLATED A1C: CPT | Performed by: FAMILY MEDICINE

## 2018-12-21 PROCEDURE — 84443 ASSAY THYROID STIM HORMONE: CPT | Performed by: FAMILY MEDICINE

## 2018-12-21 RX ORDER — METRONIDAZOLE 500 MG/1
500 TABLET ORAL 2 TIMES DAILY
Qty: 14 TABLET | Refills: 0 | Status: SHIPPED | OUTPATIENT
Start: 2018-12-21 | End: 2020-02-21

## 2018-12-21 ASSESSMENT — MIFFLIN-ST. JEOR: SCORE: 1530.41

## 2018-12-21 NOTE — PROGRESS NOTES
SUBJECTIVE:   Berhane Murray is a 43 year old female who presents to clinic today for the following health issues:      Irregular periods. - has been skipping periods , q 3 months or so in the past year and noticed some hot flashes , wondering if she is getting into perimenopause, her mom went in to menopause in her early 40s , she has a 9 yr old daughter       Duration: couple months    Description (location/character/radiation): Irregular periods    Intensity:  mild    Accompanying signs and symptoms: Heat flashes    History (similar episodes/previous evaluation): None    Precipitating or alleviating factors: None    Therapies tried and outcome: None     2)Wants to check for BV. Has had a hx of BV in the past and now she has been having same symtoms with vag odor   She also fasted and wants her cholesterol checked.   Is due for her PAP as well   Problem list and histories reviewed & adjusted, as indicated.  Additional history: as documented    Patient Active Problem List   Diagnosis     CARDIOVASCULAR SCREENING; LDL GOAL LESS THAN 160     Body mass index 33.0-33.9, adult     Obesity, unspecified obesity severity, unspecified obesity type     Cervical high risk HPV (human papillomavirus) test positive     No past surgical history on file.    Social History     Tobacco Use     Smoking status: Never Smoker     Smokeless tobacco: Never Used   Substance Use Topics     Alcohol use: Yes     No family history on file.      Current Outpatient Medications   Medication Sig Dispense Refill     albuterol (PROAIR HFA, PROVENTIL HFA, VENTOLIN HFA) 108 (90 BASE) MCG/ACT inhaler Inhale 2 puffs into the lungs every 6 hours as needed for shortness of breath / dyspnea or wheezing 1 Inhaler 0     metroNIDAZOLE (FLAGYL) 500 MG tablet Take 1 tablet (500 mg) by mouth 2 times daily (Patient not taking: Reported on 12/21/2018) 14 tablet 0     Allergies   Allergen Reactions     Prednisone      Fainted after competing full dose.      Recent  "Labs   Lab Test 08/21/17  0855 08/08/17  0853 02/20/14  1520   LDL  --  109*  --    HDL  --  49*  --    TRIG  --  48  --    ALT  --   --  38   CR  --   --  0.64   GFRESTIMATED  --   --  >90   GFRESTBLACK  --   --  >90   POTASSIUM  --   --  3.6   TSH 0.49  --   --       BP Readings from Last 3 Encounters:   12/21/18 124/82   08/04/17 115/76   01/20/16 126/70    Wt Readings from Last 3 Encounters:   12/21/18 91.1 kg (200 lb 14.4 oz)   08/04/17 87.4 kg (192 lb 9.6 oz)   01/20/16 88.5 kg (195 lb 3.2 oz)                  Labs reviewed in EPIC    Reviewed and updated as needed this visit by clinical staff       Reviewed and updated as needed this visit by Provider         ROS:  Constitutional, HEENT, cardiovascular, pulmonary, GI, , musculoskeletal, neuro, skin, endocrine and psych systems are negative, except as otherwise noted.    OBJECTIVE:     /82 (BP Location: Right arm, Patient Position: Sitting, Cuff Size: Adult Large)   Pulse 65   Temp 98.4  F (36.9  C) (Oral)   Ht 1.6 m (5' 3\")   Wt 91.1 kg (200 lb 14.4 oz)   SpO2 100%   BMI 35.59 kg/m    Body mass index is 35.59 kg/m .  GENERAL: healthy, alert and no distress  EYES: Eyes grossly normal to inspection, PERRL and conjunctivae and sclerae normal  HENT: ear canals and TM's normal, nose and mouth without ulcers or lesions  NECK: no adenopathy, no asymmetry, masses, or scars and thyroid normal to palpation  RESP: lungs clear to auscultation - no rales, rhonchi or wheezes  CV: regular rate and rhythm, normal S1 S2, no S3 or S4, no murmur, click or rub, no peripheral edema and peripheral pulses strong  ABDOMEN: soft, nontender, no hepatosplenomegaly, no masses and bowel sounds normal   (female): normal female external genitalia, normal urethral meatus, vaginal mucosa, normal cervix/adnexa/uterus without masses or discharge  MS: no gross musculoskeletal defects noted, no edema  SKIN: no suspicious lesions or rashes  NEURO: Normal strength and tone, " mentation intact and speech normal    Diagnostic Test Results:  No results found for this or any previous visit (from the past 24 hour(s)).    ASSESSMENT/PLAN:             1. Encounter for routine adult health examination without abnormal findings  Discussed diet,calcium,exercise.Went over self breast exam.Thin prep was done.Eyes and teeth UTD.No immunizations needed today.See orders below for tests ordered and screening needed.    - Lipid Profile (Chol, Trig, HDL, LDL calc)  - Pap imaged thin layer screen with HPV - recommended age 30 - 65 years (select HPV order below)    2. Menopausal syndrome (hot flashes)  Will check her thyroid and also FSH , discussed symptoms show that she is into perimenopause and usually early n the FSH could be normal or in a variety of ranges , will check .  - TSH with free T4 reflex  - Follicle stimulating hormone  - Hemoglobin A1c    3. Family history of diabetes mellitus  Her dad , sister and PGM have DM type 2 and she needs to check , she denies any symptoms and is fasting today .    4. DUB (dysfunctional uterine bleeding)  As above , most liklely early ginger or jaqueline menopause   - TSH with free T4 reflex  - Follicle stimulating hormone  - Beta HCG Qual, Urine - [facility] and [place] ([id number])    5. Vaginal odor  Will check - she does have BV and I have sent a Rx for the Metronidazole to her pharmacy .  - Wet prep    RTC if no improving or worsening.  Pt is aware  and comfortable with the current plan.      Shabana Toribio MD  [facility]

## 2018-12-22 LAB
CHOLEST SERPL-MCNC: 196 MG/DL
HDLC SERPL-MCNC: 44 MG/DL
LDLC SERPL CALC-MCNC: 136 MG/DL
NONHDLC SERPL-MCNC: 152 MG/DL
TRIGL SERPL-MCNC: 80 MG/DL
TSH SERPL DL<=0.005 MIU/L-ACNC: 0.64 MU/L (ref 0.4–4)

## 2018-12-24 LAB — FSH SERPL-ACNC: 75.1 IU/L

## 2018-12-27 LAB
COPATH REPORT: NORMAL
PAP: NORMAL

## 2018-12-31 LAB
FINAL DIAGNOSIS: NORMAL
HPV HR 12 DNA CVX QL NAA+PROBE: NEGATIVE
HPV16 DNA SPEC QL NAA+PROBE: NEGATIVE
HPV18 DNA SPEC QL NAA+PROBE: NEGATIVE
SPECIMEN DESCRIPTION: NORMAL
SPECIMEN SOURCE CVX/VAG CYTO: NORMAL

## 2019-01-02 PROBLEM — R87.810 CERVICAL HIGH RISK HPV (HUMAN PAPILLOMAVIRUS) TEST POSITIVE: Status: ACTIVE | Noted: 2017-08-04

## 2019-06-18 ENCOUNTER — ANCILLARY PROCEDURE (OUTPATIENT)
Dept: GENERAL RADIOLOGY | Facility: CLINIC | Age: 44
End: 2019-06-18
Attending: PHYSICIAN ASSISTANT
Payer: COMMERCIAL

## 2019-06-18 ENCOUNTER — OFFICE VISIT (OUTPATIENT)
Dept: FAMILY MEDICINE | Facility: CLINIC | Age: 44
End: 2019-06-18
Payer: COMMERCIAL

## 2019-06-18 VITALS
WEIGHT: 203.7 LBS | HEART RATE: 92 BPM | BODY MASS INDEX: 36.09 KG/M2 | RESPIRATION RATE: 18 BRPM | OXYGEN SATURATION: 99 % | TEMPERATURE: 98.1 F | SYSTOLIC BLOOD PRESSURE: 127 MMHG | DIASTOLIC BLOOD PRESSURE: 82 MMHG | HEIGHT: 63 IN

## 2019-06-18 DIAGNOSIS — E66.9 OBESITY, UNSPECIFIED OBESITY SEVERITY, UNSPECIFIED OBESITY TYPE: ICD-10-CM

## 2019-06-18 DIAGNOSIS — R05.9 COUGH: ICD-10-CM

## 2019-06-18 DIAGNOSIS — J01.90 ACUTE SINUSITIS WITH SYMPTOMS > 10 DAYS: Primary | ICD-10-CM

## 2019-06-18 LAB
DEPRECATED S PYO AG THROAT QL EIA: NORMAL
SPECIMEN SOURCE: NORMAL

## 2019-06-18 PROCEDURE — 87081 CULTURE SCREEN ONLY: CPT | Performed by: PHYSICIAN ASSISTANT

## 2019-06-18 PROCEDURE — 71046 X-RAY EXAM CHEST 2 VIEWS: CPT

## 2019-06-18 PROCEDURE — 87880 STREP A ASSAY W/OPTIC: CPT | Performed by: PHYSICIAN ASSISTANT

## 2019-06-18 PROCEDURE — 99214 OFFICE O/P EST MOD 30 MIN: CPT | Performed by: PHYSICIAN ASSISTANT

## 2019-06-18 RX ORDER — GUAIFENESIN AND DEXTROMETHORPHAN HYDROBROMIDE 1200; 60 MG/1; MG/1
1 TABLET, EXTENDED RELEASE ORAL 2 TIMES DAILY
Qty: 28 TABLET | Refills: 0 | Status: SHIPPED | OUTPATIENT
Start: 2019-06-18 | End: 2020-02-21

## 2019-06-18 ASSESSMENT — MIFFLIN-ST. JEOR: SCORE: 1547.07

## 2019-06-18 ASSESSMENT — PAIN SCALES - GENERAL: PAINLEVEL: NO PAIN (0)

## 2019-06-18 NOTE — PATIENT INSTRUCTIONS
Augmentin 875 mg, 1 tablet twice a day for 10 days  Increase fluids  Nasal wash  Mucinex DM 1 tablet twice a day for 10-14 days  Follow up if not better after the antibiotic course.

## 2019-06-18 NOTE — PROGRESS NOTES
Subjective     Berhane Murray is a 43 year old female who presents to clinic today for the following health issues:    HPI   Acute Illness   Acute illness concerns: Cough   Onset: over a month     Fever: no    Chills/Sweats: no    Headache (location?): no    Sinus Pressure:no    Conjunctivitis:  no    Ear Pain: no    Rhinorrhea: no    Congestion: YES    Sore Throat: no     Cough: YES-non-productive    Wheeze: YES    Chest pain: YES     Decreased Appetite: no    Shortness of Breath: YES     Nausea: no    Vomiting: no    Diarrhea:  no    Dysuria/Freq.: no    Fatigue/Achiness: no    Sick/Strep Exposure: no     Therapies Tried and outcome: None        Patient Active Problem List   Diagnosis     CARDIOVASCULAR SCREENING; LDL GOAL LESS THAN 160     Body mass index 33.0-33.9, adult     Obesity, unspecified obesity severity, unspecified obesity type     Cervical high risk HPV (human papillomavirus) test positive     History reviewed. No pertinent surgical history.    Social History     Tobacco Use     Smoking status: Never Smoker     Smokeless tobacco: Never Used   Substance Use Topics     Alcohol use: Yes     Family History   Family history unknown: Yes         Current Outpatient Medications   Medication Sig Dispense Refill     amoxicillin-clavulanate (AUGMENTIN) 875-125 MG tablet Take 1 tablet by mouth 2 times daily for 10 days 20 tablet 0     Dextromethorphan-Guaifenesin  MG TB12 Take 1 tablet by mouth 2 times daily 28 tablet 0     albuterol (PROAIR HFA, PROVENTIL HFA, VENTOLIN HFA) 108 (90 BASE) MCG/ACT inhaler Inhale 2 puffs into the lungs every 6 hours as needed for shortness of breath / dyspnea or wheezing 1 Inhaler 0     metroNIDAZOLE (FLAGYL) 500 MG tablet Take 1 tablet (500 mg) by mouth 2 times daily (Patient not taking: Reported on 6/18/2019) 14 tablet 0     Allergies   Allergen Reactions     Prednisone      Fainted after competing full dose.        Reviewed and updated as needed this visit by Provider  Tobacco  " Allergies  Meds  Problems  Med Hx  Surg Hx  Fam Hx         Review of Systems   ROS COMP: Constitutional, HEENT, cardiovascular, pulmonary, gi and gu systems are negative, except as otherwise noted.      Objective    /82 (BP Location: Right arm, Patient Position: Sitting, Cuff Size: Adult Large)   Pulse 92   Temp 98.1  F (36.7  C) (Oral)   Resp 18   Ht 1.607 m (5' 3.25\")   Wt 92.4 kg (203 lb 11.2 oz)   LMP 05/14/2019   SpO2 99%   Breastfeeding? No   BMI 35.80 kg/m    Body mass index is 35.8 kg/m .     Physical Exam   GENERAL: healthy, alert and no distress  EYES: Eyes grossly normal to inspection, PERRL and conjunctivae and sclerae normal  HENT: normal cephalic/atraumatic, both ears: clear effusion, nose and mouth without ulcers or lesions, nasal mucosa edematous , rhinorrhea clear, oropharynx clear, oral mucous membranes moist, sinuses: maxillary tenderness on bilaterally and postnasal drainage is present  NECK: no adenopathy, no asymmetry, masses, or scars and thyroid normal to palpation  RESP: lungs clear to auscultation - no rales, rhonchi or wheezes  CV: regular rate and rhythm, normal S1 S2, no S3 or S4, no murmur, click or rub, no peripheral edema and peripheral pulses strong  ABDOMEN: soft, nontender, no hepatosplenomegaly, no masses and bowel sounds normal  MS: no gross musculoskeletal defects noted, no edema    Diagnostic Test Results:  Results for orders placed or performed in visit on 06/18/19 (from the past 24 hour(s))   Strep, Rapid Screen   Result Value Ref Range    Specimen Description Throat     Rapid Strep A Screen       NEGATIVE: No Group A streptococcal antigen detected by immunoassay, await culture report.     CXR -independent read- negative- no acute infiltrates or effusion        Assessment & Plan       ICD-10-CM    1. Acute sinusitis with symptoms > 10 days J01.90 Strep, Rapid Screen     amoxicillin-clavulanate (AUGMENTIN) 875-125 MG tablet     " "Dextromethorphan-Guaifenesin  MG TB12   2. Cough R05 XR Chest 2 Views   3. Obesity, unspecified obesity severity, unspecified obesity type E66.9       Augmentin 875 mg, 1 tablet twice a day for 10 days  Increase fluids  Nasal wash  Mucinex DM 1 tablet twice a day for 10-14 days  Follow up if not better after the antibiotic course.   BMI:   Estimated body mass index is 35.8 kg/m  as calculated from the following:    Height as of this encounter: 1.607 m (5' 3.25\").    Weight as of this encounter: 92.4 kg (203 lb 11.2 oz).   Weight management plan: Discussed healthy diet and exercise guidelines            Return in about 1 week (around 6/25/2019), or if symptoms worsen or fail to improve.    Margot Barrow PA-C  [facility]      "

## 2019-06-19 LAB
BACTERIA SPEC CULT: NORMAL
SPECIMEN SOURCE: NORMAL

## 2019-11-04 ENCOUNTER — HEALTH MAINTENANCE LETTER (OUTPATIENT)
Age: 44
End: 2019-11-04

## 2020-02-16 ENCOUNTER — HEALTH MAINTENANCE LETTER (OUTPATIENT)
Age: 45
End: 2020-02-16

## 2020-02-19 ENCOUNTER — TELEPHONE (OUTPATIENT)
Dept: FAMILY MEDICINE | Facility: CLINIC | Age: 45
End: 2020-02-19

## 2020-02-19 DIAGNOSIS — Z12.31 ENCOUNTER FOR SCREENING MAMMOGRAM FOR BREAST CANCER: Primary | ICD-10-CM

## 2020-02-20 NOTE — TELEPHONE ENCOUNTER
"Closing encounter as indicated in message, \"no call back needed\".  [facility]  2nd Floor  Primary Care    "
Order placed.    Airam Rodarte M.D.   
Reason for call:  Order   Order or referral being requested: Mammo   Reason for request: Routine   Date needed: before my next appointment  Has the patient been seen by the PCP for this problem?   No current breast problems. [facility] mammo 2018     Additional comments:please add order to appt    Phone number to reach patient:  Home number on file [phone] (home)    Best Time:  Any- no call back needed    Can we leave a detailed message on this number?  Not Applicable    Travel screening: Negative  
I have reviewed and confirmed nurses' notes for patient's medications, allergies, medical history, and surgical history.

## 2020-02-21 ENCOUNTER — OFFICE VISIT (OUTPATIENT)
Dept: FAMILY MEDICINE | Facility: CLINIC | Age: 45
End: 2020-02-21
Payer: COMMERCIAL

## 2020-02-21 VITALS
HEART RATE: 78 BPM | BODY MASS INDEX: 37.12 KG/M2 | SYSTOLIC BLOOD PRESSURE: 118 MMHG | WEIGHT: 209.5 LBS | HEIGHT: 63 IN | DIASTOLIC BLOOD PRESSURE: 81 MMHG | RESPIRATION RATE: 16 BRPM | OXYGEN SATURATION: 98 %

## 2020-02-21 DIAGNOSIS — R21 RASH: ICD-10-CM

## 2020-02-21 DIAGNOSIS — L85.3 DRY SKIN: ICD-10-CM

## 2020-02-21 DIAGNOSIS — K59.00 CONSTIPATION, UNSPECIFIED CONSTIPATION TYPE: ICD-10-CM

## 2020-02-21 DIAGNOSIS — Z00.00 ENCOUNTER FOR ROUTINE ADULT HEALTH EXAMINATION WITHOUT ABNORMAL FINDINGS: Primary | ICD-10-CM

## 2020-02-21 PROCEDURE — 99213 OFFICE O/P EST LOW 20 MIN: CPT | Mod: 25 | Performed by: FAMILY MEDICINE

## 2020-02-21 PROCEDURE — 99396 PREV VISIT EST AGE 40-64: CPT | Performed by: FAMILY MEDICINE

## 2020-02-21 RX ORDER — PRENATAL VIT 91/IRON/FOLIC/DHA 28-975-200
COMBINATION PACKAGE (EA) ORAL 2 TIMES DAILY
Qty: 14 G | Refills: 0 | Status: SHIPPED | OUTPATIENT
Start: 2020-02-21 | End: 2021-04-02

## 2020-02-21 ASSESSMENT — MIFFLIN-ST. JEOR: SCORE: 1568.38

## 2020-02-21 ASSESSMENT — PAIN SCALES - GENERAL: PAINLEVEL: NO PAIN (0)

## 2020-02-21 NOTE — PROGRESS NOTES
SUBJECTIVE:   CC: Berhane Murray is an 44 year old woman who presents for preventive health visit.     Healthy Habits:     Getting at least 3 servings of Calcium per day:  Yes    Bi-annual eye exam:  Yes    Dental care twice a year:  Yes    Sleep apnea or symptoms of sleep apnea:  Excessive snoring    Diet:  Regular (no restrictions)    Frequency of exercise:  1 day/week    Duration of exercise:  15-30 minutes    Taking medications regularly:  Yes    Barriers to taking medications:  Not applicable    Medication side effects:  None    PHQ-2 Total Score: 0    Additional concerns today:  Yes          1) constipation, on and off , she has had  this problem but currently denies any issues ,  2) rash under the breasts roberto carlos and between the breasts, sometimes itches but discoloration mostly   3) dry skin, seems to be worse on her hands but also body and worse in the winter months , arms legs etc .     Today's PHQ-2 Score:   PHQ-2 ( 1999 Pfizer) 2/21/2020   Q1: Little interest or pleasure in doing things 0   Q2: Feeling down, depressed or hopeless 0   PHQ-2 Score 0   Q1: Little interest or pleasure in doing things Not at all   Q2: Feeling down, depressed or hopeless Not at all   PHQ-2 Score 0       Abuse: Current or Past(Physical, Sexual or Emotional)- No  Do you feel safe in your environment? Yes        Social History     Tobacco Use     Smoking status: Never Smoker     Smokeless tobacco: Never Used   Substance Use Topics     Alcohol use: Yes     If you drink alcohol do you typically have >3 drinks per day or >7 drinks per week? Not applicable    Alcohol Use 2/21/2020   Prescreen: >3 drinks/day or >7 drinks/week? No   Prescreen: >3 drinks/day or >7 drinks/week? -   No flowsheet data found.    Reviewed orders with patient.  Reviewed health maintenance and updated orders accordingly - Yes  Labs reviewed in EPIC  BP Readings from Last 3 Encounters:   02/21/20 118/81   06/18/19 127/82   12/21/18 124/82    Wt Readings from Last 3  Encounters:   02/21/20 95 kg (209 lb 8 oz)   06/18/19 92.4 kg (203 lb 11.2 oz)   12/21/18 91.1 kg (200 lb 14.4 oz)                  Patient Active Problem List   Diagnosis     CARDIOVASCULAR SCREENING; LDL GOAL LESS THAN 160     Body mass index 33.0-33.9, adult     Obesity, unspecified obesity severity, unspecified obesity type     Cervical high risk HPV (human papillomavirus) test positive     No past surgical history on file.    Social History     Tobacco Use     Smoking status: Never Smoker     Smokeless tobacco: Never Used   Substance Use Topics     Alcohol use: Yes     Family History   Family history unknown: Yes         Current Outpatient Medications   Medication Sig Dispense Refill     terbinafine 1 % EX external cream Apply topically 2 times daily 14 g 0     Allergies   Allergen Reactions     Prednisone      Fainted after competing full dose.      Recent Labs   Lab Test 12/21/18  1506 08/21/17  0855 08/08/17  0853 02/20/14  1520   A1C 5.6  --   --   --    *  --  109*  --    HDL 44*  --  49*  --    TRIG 80  --  48  --    ALT  --   --   --  38   CR  --   --   --  0.64   GFRESTIMATED  --   --   --  >90   GFRESTBLACK  --   --   --  >90   POTASSIUM  --   --   --  3.6   TSH 0.64 0.49  --   --         Mammogram Screening: Patient under age 50, mutual decision reflected in health maintenance.      Pertinent mammograms are reviewed under the imaging tab.  History of abnormal Pap smear: NO - age 30- 65 PAP every 3 years recommended  PAP / HPV Latest Ref Rng & Units 12/21/2018 8/4/2017 2/20/2014   PAP - NIL NIL NIL   HPV 16 DNA NEG:Negative Negative Negative -   HPV 18 DNA NEG:Negative Negative Negative -   OTHER HR HPV NEG:Negative Negative Positive(A) -     Reviewed and updated as needed this visit by clinical staff         Reviewed and updated as needed this visit by Provider        Past Medical History:   Diagnosis Date     Cervical high risk HPV (human papillomavirus) test positive 08/04/2017 08/04/17:  NIL pap, + HR HPV (not 16 or 18) result.       No past surgical history on file.    Review of Systems  CONSTITUTIONAL: NEGATIVE for fever, chills, change in weight  INTEGUMENTARU/SKIN: NEGATIVE for worrisome rashes, moles or lesions  EYES: NEGATIVE for vision changes or irritation  ENT: NEGATIVE for ear, mouth and throat problems  RESP: NEGATIVE for significant cough or SOB  BREAST: NEGATIVE for masses, tenderness or discharge  CV: NEGATIVE for chest pain, palpitations or peripheral edema  GI: NEGATIVE for nausea, abdominal pain, heartburn, or change in bowel habits  : NEGATIVE for unusual urinary or vaginal symptoms. Periods are regular.  MUSCULOSKELETAL: NEGATIVE for significant arthralgias or myalgia  NEURO: NEGATIVE for weakness, dizziness or paresthesias  PSYCHIATRIC: NEGATIVE for changes in mood or affect     OBJECTIVE:   There were no vitals taken for this visit.  Physical Exam  GENERAL: healthy, alert and no distress  EYES: Eyes grossly normal to inspection, PERRL and conjunctivae and sclerae normal  HENT: ear canals and TM's normal, nose and mouth without ulcers or lesions  NECK: no adenopathy, no asymmetry, masses, or scars and thyroid normal to palpation  RESP: lungs clear to auscultation - no rales, rhonchi or wheezes  BREAST: normal without masses, tenderness or nipple discharge and no palpable axillary masses or adenopathy  CV: regular rate and rhythm, normal S1 S2, no S3 or S4, no murmur, click or rub, no peripheral edema and peripheral pulses strong  ABDOMEN: soft, nontender, no hepatosplenomegaly, no masses and bowel sounds normal  MS: no gross musculoskeletal defects noted, no edema  SKIN: no suspicious lesions EXCEPT dry skin , hands legs , also yeast like  dermatitis in between the breasts and under the breasts with some darkening spots .  NEURO: Normal strength and tone, mentation intact and speech normal  PSYCH: mentation appears normal, affect normal/bright    Diagnostic Test Results:  Labs  "reviewed in Epic  No results found for any visits on 02/21/20.    ASSESSMENT/PLAN:   1. Encounter for routine adult health examination without abnormal findings  Discussed diet,calcium,exercise.Went over self breast exam.Thin prep was NOT done.Eyes and teeth UTD.No immunizations needed today.See orders below for tests ordered and screening needed.      2. Constipation, unspecified constipation type  Discussed increasing fiber in the diet, lots of water , exercise etc , use Miralax as needed for constipation, if this persists she would need to be seen .    3. Rash  Will use BID for 1 to 2 weeks if not improvement will let me know . Pt is aware  and comfortable with the current plan.    - terbinafine 1 % EX external cream; Apply topically 2 times daily  Dispense: 14 g; Refill: 0    4. Dry skin  Discussed Cetaphil for soap and also Vanicream after showers or baths or daily to prevent dry skin.      COUNSELING:  Reviewed preventive health counseling, as reflected in patient instructions       Regular exercise       Healthy diet/nutrition       Vision screening       Hearing screening       Osteoporosis Prevention/Bone Health    Estimated body mass index is 35.8 kg/m  as calculated from the following:    Height as of 6/18/19: 1.607 m (5' 3.25\").    Weight as of 6/18/19: 92.4 kg (203 lb 11.2 oz).         reports that she has never smoked. She has never used smokeless tobacco.      Counseling Resources:  ATP IV Guidelines  Pooled Cohorts Equation Calculator  Breast Cancer Risk Calculator  FRAX Risk Assessment  ICSI Preventive Guidelines  Dietary Guidelines for Americans, 2010  USDA's MyPlate  ASA Prophylaxis  Lung CA Screening    Shabana Toribio MD  [facility]  "

## 2020-03-03 ENCOUNTER — ANCILLARY PROCEDURE (OUTPATIENT)
Dept: MAMMOGRAPHY | Facility: CLINIC | Age: 45
End: 2020-03-03
Payer: COMMERCIAL

## 2020-03-03 DIAGNOSIS — Z12.31 ENCOUNTER FOR SCREENING MAMMOGRAM FOR BREAST CANCER: ICD-10-CM

## 2020-03-03 PROCEDURE — 77063 BREAST TOMOSYNTHESIS BI: CPT | Mod: TC

## 2020-03-03 PROCEDURE — 77067 SCR MAMMO BI INCL CAD: CPT | Mod: TC

## 2020-03-20 ENCOUNTER — VIRTUAL VISIT (OUTPATIENT)
Dept: FAMILY MEDICINE | Facility: CLINIC | Age: 45
End: 2020-03-20
Payer: COMMERCIAL

## 2020-03-20 ENCOUNTER — VIRTUAL VISIT (OUTPATIENT)
Dept: FAMILY MEDICINE | Facility: OTHER | Age: 45
End: 2020-03-20

## 2020-03-20 DIAGNOSIS — R05.9 COUGH: Primary | ICD-10-CM

## 2020-03-20 PROCEDURE — 99441 ZZC PHYSICIAN TELEPHONE EVALUATION 5-10 MIN: CPT | Performed by: NURSE PRACTITIONER

## 2020-03-20 NOTE — PROGRESS NOTES
"Berhane Murray is a 44 year old female who is being evaluated via a billable telephone visit.      The patient has been notified of following:     \"This telephone visit will be conducted via a call between you and your physician/provider. We have found that certain health care needs can be provided without the need for a physical exam.  This service lets us provide the care you need with a short phone conversation.  If a prescription is necessary we can send it directly to your pharmacy.  If lab work is needed we can place an order for that and you can then stop by our lab to have the test done at a later time.    If during the course of the call the physician/provider feels a telephone visit is not appropriate, you will not be charged for this service.\"     Berhane Murray complains of    Chief Complaint   Patient presents with     Cough       I have reviewed and updated the patient's Past Medical History, Social History, Family History and Medication List.    ALLERGIES  Prednisone    Patient had cough/wheezing last summer and she was seen in , chest film showed RLLL.  She did not know that she had pneumonia but was given Augmentin for a sinus inefction at the time and improved.  Now, she has similar type cough X 6 days - dry, nonproductive,no wheezing,  no sore throat, has slight nasal congestion (clear discharge), no ear pain, facial pressure, headache, fatigue, no fever or chills, cp, shortness of breath, palpitations, nausea or vomiting, appetite remains normal, no rashes,. She does have seasonal allergies, is not taking anything for them presently.  No recent travel, no ill contacts. Patient is asking for a chest x ray to rule out pneumonia.      Assessment/Plan:  1. Cough  Large differential but not likely pneumonia so chest film not indicated at this time.  Advised her to use humidified, air frequent fluids,  Reviewed indications for return phone call for  nw/worsening symptoms.      Phone call duration: 9:31 " minutes    ABNER Molina CNP

## 2020-03-20 NOTE — PATIENT INSTRUCTIONS
At [facility], we strive to deliver an exceptional experience to you, every time we see you. If you receive a survey, please complete it as we do value your feedback.  If you have MyChart, you can expect to receive results automatically within 24 hours of their completion.  Your provider will send a note interpreting your results as well.   If you do not have MyChart, you should receive your results in about a week by mail.    Your care team:                            Family Medicine Internal Medicine   MD London Evans MD Shantel Branch-Fleming, MD Katya Georgiev PA-C Megan Hill, APRLIBERTY Mitchell, MD Pediatrics   Hao Karu, PAJOAN Kahn, MD Ani Maloney APRN CNP   MD Nellie Quintanilla MD Deborah Mielke, MD Kim Thein, APRN [facility]      Clinic hours: Monday - Thursday 7 am-7 pm; Fridays 7 am-5 pm.   Urgent care: Monday - Friday 11 am-9 pm; Saturday and Sunday 9 am-5 pm.    Clinic: [phone]       [facility]: Monday - Thursday 8 am - 7 pm; Friday 8 am - 6 pm  [facility]: [phone]     Use [email or web address] for 24/7 diagnosis and treatment of dozens of conditions.

## 2020-03-20 NOTE — PROGRESS NOTES
"Date: 2020 09:01:33  Clinician: Jenaro Harley  Clinician NPI: [id number]  Patient: Berhane Murray  Patient : 1975  Patient Address: [address] [place] [address] [address], [place] [address]  Patient Phone: [phone]  Visit Protocol: URI  Patient Summary:  Berhane is a 44 year old ( : 1975 ) female who initiated a Visit for COVID-19 (Coronavirus) evaluation and screening. When asked the question \"Please sign me up to receive news, health information and promotions from [employer].\", Berhane responded \"No\".    Berhane states her symptoms started 1-2 days ago.   Her symptoms consist of a cough and nasal congestion.   Symptom details     Nasal secretions: The color of her mucus is clear.    Cough: Berhane coughs a few times an hour and her cough is not more bothersome at night. Phlegm does not come into her throat when she coughs. She believes her cough is caused by post-nasal drip.      Berhane denies having headache, fever, ear pain, rhinitis, enlarged lymph nodes, facial pain or pressure, myalgias, wheezing, sore throat, malaise, chills, and teeth pain. She also denies having recent facial or sinus surgery in the past 60 days and taking antibiotic medication for the symptoms. She is not experiencing dyspnea.   Precipitating events  She has not recently been exposed to someone with influenza. Berhane has not been in close contact with any high risk individuals.   Pertinent COVID-19 (Coronavirus) information  Berhane has not traveled internationally or to the areas where COVID-19 (Coronavirus) is widespread, including cruise ship travel in the last 14 days before the start of her symptoms.   Berhane has not had a close contact with a laboratory-confirmed COVID-19 patient within 14 days of symptom onset. She also has not had a close contact with a suspected COVID-19 patient within 14 days of symptom onset.   Berhane is not a healthcare worker and does not work in a healthcare facility.   Pertinent medical history  Berhane does not get " yeast infections when she takes antibiotics.   Berhane does not need a return to work/school note.   Weight: 205 lbs   Berhane does not smoke or use smokeless tobacco.   She denies pregnancy and denies breastfeeding. Her last period was over a month ago.   Additional information as reported by the patient (free text): Cough and my chart stated I had Pneumonia in June 2019 and I want to confirm it did not return   Weight: 205 lbs    MEDICATIONS: No current medications, ALLERGIES: NKDA  Clinician Response:  Dear Berhane,  I am sorry you are not feeling well. To determine the most appropriate care for you, I would like you to be seen in person to further discuss your health history and symptoms.  You will not be charged for this Visit. Thank you for trusting us with your care.  COVID-19 (Coronavirus) General Information  With the increase in the number of COVID-19 (Coronavirus) cases, we understand you may have some questions. Below is some helpful information on COVID-19 (Coronavirus).  How can I protect myself and others from the COVID-19 (Coronavirus)?  Because there is currently no vaccine to prevent infection, the best way to protect yourself is to avoid being exposed to this virus. Put distance between yourself and other people if COVID-19 (Coronavirus) is spreading in your community. The virus is thought to spread mainly from person-to-person.     Between people who are in close contact with one another (within about 6 about) for a prolonged period (10 minutes or longer).    Through respiratory droplets produced when an infected person coughs or sneezes.     The CDC recommends the following additional steps to protect yourself and others:     Wash your hands often with soap and water for at least 20 seconds, especially after blowing your nose, coughing, or sneezing; going to the bathroom; and before eating or preparing food.  Use an alcohol-based hand  that contains at least 60 percent alcohol if soap and  water are not available.        Avoid touching your eyes, nose and mouth with unwashed hands.    Avoid close contact with people who are sick.    Stay home when you are sick.    Cover your cough or sneeze with a tissue, then throw the tissue in the trash.    Clean and disinfect frequently touched objects and surfaces.     You can help stop COVID-19 (Coronavirus) by knowing the signs and symptoms:     Fever    Cough    Shortness of breath     Contact your healthcare provider if   Develop symptoms   AND   Have been in close contact with a person known to have COVID-19 (Coronavirus) or live in or have recently traveled from an area with ongoing spread of COVID-19 (Coronavirus). Call ahead before you go to a doctor's office or emergency room. Tell them about your recent travel and your symptoms.   For the most up to date information, visit the CDC's website.  Self-monitoring  Self-monitoring means people should monitor themselves for fever by taking their temperatures twice a day and remain alert for a cough or difficulty breathing.  It is important to check your health two times each day for 14 days after a potential exposure to a person with COVID-19 (Coronavirus) or after travel from a location where COVID-19 (Coronavirus) is widespread. If you have been exposed to a person with COVID-19 (Coronavirus), it may take up to 14 days to know if you will get sick. Follow the steps below to check and record your health.     Take your temperature with a thermometer twice a day, once in the morning and once in the evening, and watch for a cough or difficulty breathing for 14 days.    Write down your temperature and any COVID-19 symptoms you may have: feeling feverish, coughing, or difficulty breathing.    Stay home from work or school.    Do not take public transportation, taxis, or ride-shares.    Avoid crowded places (such as shopping centers and movie theaters) and limit your activities in public.    Keep your distance from  others (about 6 feet or 2 meters).    If you get sick with fever, cough, or trouble breathing, contact your healthcare provider and tell them about your recent travel and/or your symptoms.    If you need to seek medical care for other reasons, such as dialysis, call ahead to your doctor and tell them about your recent travel.     Steps to help prevent the spread of COVID-19 (Coronavirus) if you are sick  If you are sick with COVID-19 (Coronavirus) or suspect you are infected with the virus that causes COVID-19 (Coronavirus), follow the steps below to help prevent the disease from spreading&nbsp;to people in your home and community.     Stay home except to get medical care. Home isolation may be started in consultation with your healthcare clinician.    Separate yourself from other people and animals in your home.    Call ahead before visiting your doctor if you have a medical appointment.    Wear a facemask when you are around other people.    Cover your cough and sneezes.    Clean your hands often.    Avoid sharing personal household items.    Clean and disinfect frequently touched objects and surfaces everyday.    You will need to have someone drop off medications or household supplies (if needed) at your house without coming inside or in contact with you or others living in your house.    Monitor your symptoms and seek prompt medical care if your illness is worsening (e.g. Difficulty breathing).    Discontinue home isolation only in consultation with your healthcare provider.     For more detailed and up to date information on what to do if you are sick, visit this link: What to Do If You Are Sick With Coronavirus Disease 2019 (COVID-19).  Do I need to be tested for COVID-19 (Coronavirus)?     At this time, the limited number of available tests are controlled by the state and local health departments and are being reserved for more seriously ill patients, those with known exposure to confirmed patients, and those  "with recent travel (within 14 days) to countries with high rates of COVID-19 (Coronavirus).    Decisions on which patients receive testing will be based on the local spread of COVID-19 (Coronavirus) as well as the symptoms. Your healthcare provider will make the final decision on whether you should be tested.    In the meantime, if you have concerns that you may have been exposed, it is reasonable to practice \"social distancing.\"&nbsp; If you are ill with a cold or flu-like illness, please monitor your symptoms and reach out to your healthcare provider if your symptoms worsen.    For more up to date information, visit this link: COVID-19 (Coronavirus) Frequently Asked Questions and Answers.      Diagnosis: Refer for additional evaluation  Diagnosis ICD: R69  "

## 2020-06-07 ENCOUNTER — OFFICE VISIT (OUTPATIENT)
Dept: URGENT CARE | Facility: URGENT CARE | Age: 45
End: 2020-06-07
Payer: COMMERCIAL

## 2020-06-07 ENCOUNTER — ANCILLARY PROCEDURE (OUTPATIENT)
Dept: GENERAL RADIOLOGY | Facility: CLINIC | Age: 45
End: 2020-06-07
Attending: PHYSICIAN ASSISTANT
Payer: COMMERCIAL

## 2020-06-07 VITALS
OXYGEN SATURATION: 97 % | TEMPERATURE: 98.3 F | DIASTOLIC BLOOD PRESSURE: 82 MMHG | HEART RATE: 77 BPM | SYSTOLIC BLOOD PRESSURE: 124 MMHG | RESPIRATION RATE: 17 BRPM

## 2020-06-07 DIAGNOSIS — J20.9 ACUTE BRONCHITIS WITH SYMPTOMS > 10 DAYS: Primary | ICD-10-CM

## 2020-06-07 DIAGNOSIS — R05.9 COUGH: ICD-10-CM

## 2020-06-07 PROCEDURE — 99214 OFFICE O/P EST MOD 30 MIN: CPT | Performed by: PHYSICIAN ASSISTANT

## 2020-06-07 PROCEDURE — 71046 X-RAY EXAM CHEST 2 VIEWS: CPT

## 2020-06-07 RX ORDER — AZITHROMYCIN 250 MG/1
TABLET, FILM COATED ORAL
Qty: 6 TABLET | Refills: 0 | Status: SHIPPED | OUTPATIENT
Start: 2020-06-07 | End: 2021-04-02

## 2020-06-07 RX ORDER — ALBUTEROL SULFATE 90 UG/1
2 AEROSOL, METERED RESPIRATORY (INHALATION) EVERY 6 HOURS
Qty: 1 INHALER | Refills: 0 | Status: SHIPPED | OUTPATIENT
Start: 2020-06-07 | End: 2023-05-16

## 2020-06-07 ASSESSMENT — ENCOUNTER SYMPTOMS
VOMITING: 0
MUSCULOSKELETAL NEGATIVE: 1
DIARRHEA: 0
NECK STIFFNESS: 0
NAUSEA: 0
SHORTNESS OF BREATH: 0
WOUND: 0
BACK PAIN: 0
PALPITATIONS: 0
NECK PAIN: 0
ARTHRALGIAS: 0
WEAKNESS: 0
LIGHT-HEADEDNESS: 0
COUGH: 1
CARDIOVASCULAR NEGATIVE: 1
CHILLS: 0
EYES NEGATIVE: 1
RHINORRHEA: 0
ENDOCRINE NEGATIVE: 1
DIZZINESS: 0
ALLERGIC/IMMUNOLOGIC NEGATIVE: 1
SORE THROAT: 0
JOINT SWELLING: 0
BRUISES/BLEEDS EASILY: 0
HEMATOLOGIC/LYMPHATIC NEGATIVE: 1
FEVER: 0
HEADACHES: 0
MYALGIAS: 0

## 2020-06-07 NOTE — PROGRESS NOTES
Asked to see for chronic low back pain    Admitted with right sided weakness and facial droop    Complicated past medical history with inpatient hospital stay for most of last year    Describes non-specific low back pain    Mri 8/2017 with ddd and old compression fracture    Was  To have oksana as op but could not schedule appt    Mri of brain 11:8 Small acute/early subacute left striated capsular infarct. No mass  effect or evidence for hemorrhage.    Pt notes pain is better controlled overall but unchanged from yesterday and scores pain at 4-5/10 with goal of 4  Scheduled apap and tramadol    norco 5 mg once yesterday and none today       A  Chronic low back pain   cva with right sided weakness  A fib cad  iddm       D/w pt   No change  Consider duragesic   Increase hs gabapentin     Awake in bed  Lying still  No pain behavior at rest   Denies pain   No palpable tenderness     Visit Vitals  /70 (BP Location: LUE, Patient Position: Semi-Russell's)   Pulse 86   Temp 97.8 °F (36.6 °C) (Oral)   Resp 18   Ht 5' 7\" (1.702 m)   Wt 231 lb 7.7 oz (105 kg)   SpO2 94%   BMI 36.26 kg/m²         Patient Active Problem List   Diagnosis   • Other and unspecified hyperlipidemia   • Type II or unspecified type diabetes mellitus without mention of complication, not stated as uncontrolled   • Obesity   • ASVD (arteriosclerotic vascular disease)   • HTN (hypertension)   • Essential tremor   • BPH (benign prostatic hypertrophy)   • CHRISTIAN (obstructive sleep apnea)   • Prostate cancer (CMS/HCC)   • IDDM (insulin dependent diabetes mellitus) (CMS/HCC)   • Peripheral autonomic neuropathy in disorders classified elsewhere(337.1)   • Dermatophytosis of nail   • Edema   • Unspecified venous (peripheral) insufficiency   • Other hammer toe (acquired)   • Abnormality of gait   • Memory loss   • Other nonspecific abnormal result of function study of brain and central nervous system   • Normal pressure hydrocephalus   • Incontinence of urine   •  Chief Complaint:     Chief Complaint   Patient presents with     Cough     Lingering cough since March. Wanting an x-ray today       HPI: Berhane Murray is an 44 year old female who presents with chest congestion and cough nonproductive, persistent. Patient was sent here via [facility] for further evaluation of symptoms.  Symptoms began 1  months ago and has unchanged.  There is no shortness of breath, wheezing and chest pain.      Patient denies any recent travel or exposure to know COVID positive tested individual.  Patient is not a healthcare worker or .      ROS:     Review of Systems   Constitutional: Negative for chills and fever.   HENT: Positive for congestion. Negative for ear pain, rhinorrhea and sore throat.    Eyes: Negative.    Respiratory: Positive for cough. Negative for shortness of breath.    Cardiovascular: Negative.  Negative for chest pain and palpitations.   Gastrointestinal: Negative for diarrhea, nausea and vomiting.   Endocrine: Negative.    Genitourinary: Negative.    Musculoskeletal: Negative.  Negative for arthralgias, back pain, joint swelling, myalgias, neck pain and neck stiffness.   Skin: Negative.  Negative for rash and wound.   Allergic/Immunologic: Negative.  Negative for immunocompromised state.   Neurological: Negative for dizziness, weakness, light-headedness and headaches.   Hematological: Negative.  Does not bruise/bleed easily.        Respiratory History  occasional episodes of bronchitis       Family History   Family History   Family history unknown: Yes        Problem history  Patient Active Problem List   Diagnosis     CARDIOVASCULAR SCREENING; LDL GOAL LESS THAN 160     Body mass index 33.0-33.9, adult     Obesity, unspecified obesity severity, unspecified obesity type     Cervical high risk HPV (human papillomavirus) test positive        Allergies  Allergies   Allergen Reactions     Prednisone      Fainted after competing full dose.         Social History  Social  History     Socioeconomic History     Marital status: Single     Spouse name: Not on file     Number of children: Not on file     Years of education: Not on file     Highest education level: Not on file   Occupational History     Not on file   Social Needs     Financial resource strain: Not on file     Food insecurity     Worry: Not on file     Inability: Not on file     Transportation needs     Medical: Not on file     Non-medical: Not on file   Tobacco Use     Smoking status: Never Smoker     Smokeless tobacco: Never Used   Substance and Sexual Activity     Alcohol use: Yes     Drug use: No     Sexual activity: Yes     Partners: Male   Lifestyle     Physical activity     Days per week: Not on file     Minutes per session: Not on file     Stress: Not on file   Relationships     Social connections     Talks on phone: Not on file     Gets together: Not on file     Attends Sikh service: Not on file     Active member of club or organization: Not on file     Attends meetings of clubs or organizations: Not on file     Relationship status: Not on file     Intimate partner violence     Fear of current or ex partner: Not on file     Emotionally abused: Not on file     Physically abused: Not on file     Forced sexual activity: Not on file   Other Topics Concern     Parent/sibling w/ CABG, MI or angioplasty before 65F 55M? Not Asked   Social History Narrative     Not on file        Current Meds    Current Outpatient Medications:      albuterol (PROAIR HFA/PROVENTIL HFA/VENTOLIN HFA) 108 (90 Base) MCG/ACT inhaler, Inhale 2 puffs into the lungs every 6 hours, Disp: 1 Inhaler, Rfl: 0     azithromycin (ZITHROMAX) 250 MG tablet, Two tablets first day, then one tablet daily for four days., Disp: 6 tablet, Rfl: 0     terbinafine 1 % EX external cream, Apply topically 2 times daily (Patient not taking: Reported on 3/20/2020), Disp: 14 g, Rfl: 0        OBJECTIVE     Vital signs reviewed by Magan Hoyt PA-C  /82    S/P CABG x 3   • expected acute post op blood loss   • Long term (current) use of anticoagulants   • Nonhealing surgical wound   • Pathologic fracture of vertebrae   • Lumbar spinal stenosis   • Compression deformity of vertebra   • Closed fracture of lumbar vertebra without mention of spinal cord injury   • Sepsis (CMS/[facility])   • Acute UTI   • Cellulitis of left leg   • BP (bullous pemphigoid)   • Bullous pemphigoid   • Lymphedema   • Sepsis (CMS/[facility])   • Sepsis(995.91)   • Chronic pain   • Prostate cancer (CMS/[facility])   • Leukocytosis   • Generalized weakness   • Generalized weakness   • Cellulitis of left leg   • Chronic osteomyelitis of left lower leg with draining sinus (CMS/[facility])   • Atrial fibrillation, chronic (CMS/[facility])   • Skin flap necrosis (CMS/[facility])   • CKD (chronic kidney disease), stage III (CMS/[facility])   • Anemia, chronic disease   • Cholecystitis   • Pleural effusion, left   • Cerebrovascular accident (CVA) (CMS/[facility])   • ESRD (end stage renal disease) (CMS/[facility])   • PFO (patent foramen ovale)   • Ischemic cardiomyopathy     Past Medical History:   Diagnosis Date   • Anesthesia complication     longer to wake   • Arthritis    • ASVD (arteriosclerotic vascular disease)    • Atrial fibrillation (CMS/[facility])    • Blurred vision, bilateral    • Bone infection (CMS/[facility])     osteomylitis left femur - hospitalized from 2/20/17 -> 5/5/2018 at [facility]   • BPH (benign prostatic hypertrophy)    • Bullous pemphigoid    • Chronic pain     lower back pain    • Congestive cardiac failure (CMS/[facility])    • Difficulty waking     12/2013 kypho trouble waking and breathing-admitted to ICU   • Failed moderate sedation during procedure    • Herniated disc    • History of short term memory loss    • HLD (hyperlipidemia)    • HTN (hypertension)    • IDDM (insulin dependent diabetes mellitus) (CMS/[facility])     Checks Blood Sugars QOD   • Inflammatory bowel disease    • Kidney stone     Passed X 1   • MRSA (methicillin resistant Staphylococcus  Pulse 77   Temp 98.3  F (36.8  C)   Resp 17   SpO2 97%      Physical Exam  Vitals signs and nursing note reviewed.   Constitutional:       General: She is not in acute distress.     Appearance: She is well-developed. She is not ill-appearing, toxic-appearing or diaphoretic.   HENT:      Head: Normocephalic and atraumatic.      Right Ear: Hearing, tympanic membrane, ear canal and external ear normal. Tympanic membrane is not perforated, erythematous, retracted or bulging.      Left Ear: Hearing, tympanic membrane, ear canal and external ear normal. Tympanic membrane is not perforated, erythematous, retracted or bulging.      Nose: No mucosal edema, congestion or rhinorrhea.      Right Sinus: No maxillary sinus tenderness or frontal sinus tenderness.      Left Sinus: No maxillary sinus tenderness or frontal sinus tenderness.      Mouth/Throat:      Pharynx: No pharyngeal swelling, oropharyngeal exudate, posterior oropharyngeal erythema or uvula swelling.      Tonsils: No tonsillar exudate or tonsillar abscesses. 0 on the right. 0 on the left.   Eyes:      General:         Right eye: No discharge.         Left eye: No discharge.      Pupils: Pupils are equal, round, and reactive to light.   Neck:      Musculoskeletal: Normal range of motion and neck supple.   Cardiovascular:      Rate and Rhythm: Normal rate and regular rhythm.      Heart sounds: Normal heart sounds. No murmur. No friction rub. No gallop.    Pulmonary:      Effort: Pulmonary effort is normal. No respiratory distress.      Breath sounds: Normal breath sounds. No decreased breath sounds, wheezing, rhonchi or rales.   Chest:      Chest wall: No tenderness.   Abdominal:      General: Bowel sounds are normal. There is no distension.      Palpations: Abdomen is soft. There is no mass.      Tenderness: There is no abdominal tenderness. There is no guarding.   Lymphadenopathy:      Head:      Right side of head: No submental, submandibular, tonsillar,  aureus)    • Normal pressure hydrocephalus     To have Shunt Placed 6/27/2013   • Obesity    • CHRISTIAN (obstructive sleep apnea)      Does not use CPAP   • Peripheral neuropathy    • Pneumonia    • Prostate cancer (CMS/HCC) ~ 2011    Radiation X 38 Treatments   • S/P CABG x 3 7/1/2013   • Tremor    • Tremors of nervous system    • Uncomplicated senile dementia    • Urinary incontinence    • Urinary tract infection    • Wound infection after surgery    • Wound of left leg 02/2018     Past Surgical History:   Procedure Laterality Date   • Cardiac surgery  07/01/2013    CABG   • Colonoscopy  01/01/2009   • Colonoscopy diagnostic  5/21/2013    Colonoscopy, Dx, 3 Polyps   • Coronary angiogram - cv  6/25/2013   • Eye surgery  06/11/2013    left vitrectomy,laser and retina peel   • Eye surgery Left 11/04/2013    left cataract extraction with lens implant   • Eye surgery Right 12/02/2013    right cataract extraction with lens implant   • Hernia repair      Left Inguinal Hernia Repair   • Incision and drainage tibia  12/23/2017   • Joint replacement  ~2010    Lt knee replacement   • Kyphoplasty  12/13/2013    L1 kyphoplasty   • Osteotomy  02/20/2018    debridement, left tibial ostetomy   • Ptca  07/01/1999    W/ Stent   • Service to gastroenterology     • Service to urology     • Skin biopsy     • Skin graft Left 05/15/2018    Left lower leg   • Tibia debridement Left 03/16/2018    left tibia debridement, VAC   • Tibia debridement Left 04/02/2018    left tibia debridement   • Tibia debridement Left 04/23/2018    left tibia debridement     ALLERGIES:   Allergen Reactions   • Sulfa Antibiotics Other (See Comments)     Unknown     Social History     Socioeconomic History   • Marital status: /Civil Union     Spouse name: Not on file   • Number of children: Not on file   • Years of education: Not on file   • Highest education level: Not on file   Social Needs   • Financial resource strain: Not on file   • Food insecurity -  preauricular or posterior auricular adenopathy.      Left side of head: No submental, submandibular, tonsillar, preauricular or posterior auricular adenopathy.      Cervical: No cervical adenopathy.      Right cervical: No superficial or posterior cervical adenopathy.     Left cervical: No superficial or posterior cervical adenopathy.   Skin:     General: Skin is warm and dry.      Findings: No rash.   Neurological:      Mental Status: She is alert and oriented to person, place, and time.      Cranial Nerves: No cranial nerve deficit.      Deep Tendon Reflexes: Reflexes are normal and symmetric.   Psychiatric:         Behavior: Behavior normal. Behavior is cooperative.         Thought Content: Thought content normal.         Judgment: Judgment normal.           Labs:     No results found for any visits on 06/07/20.    Medical Decision Making:    Differential Diagnosis:  URI Adult/Peds:  Bronchitis-viral, Pneumonia and Viral upper respiratory illness        ASSESSMENT    1. Acute bronchitis with symptoms > 10 days    2. Cough        PLAN    Patient presents with 1 month(s) chest congestion and cough nonproductive, persistent.    Patient is in no acute distress.    Temp is 98.3 in clinic today, lung sounds were clear, and O2 sats at 97% on RA.  Imaging to rule out pneumonia was negative for any acute infiltrates or consolidations per my read.  With length of symptoms, Rx for Zithromax today.  Rx for Albuterol inhaler.  Patient encouraged to start OTC allergy medication.  Rest, Push fluids, vaporizer, elevation of head of bed.  Ibuprofen and or Tylenol for any fever or body aches.  Over the counter cough suppressant- PRN- as discussed.   If symptoms worsen, recheck immediately otherwise follow up with your PCP in 1 week if symptoms are not improving.  Worrisome symptoms discussed with instructions to go to the ED.  Patient given COVID isolation instructions.  Patient declined COVID testing today.  Patient verbalized  worry: Not on file   • Food insecurity - inability: Not on file   • Transportation needs - medical: Not on file   • Transportation needs - non-medical: Not on file   Occupational History   • Not on file   Tobacco Use   • Smoking status: Former Smoker     Packs/day: 1.00     Years: 35.00     Pack years: 35.00     Types: Cigarettes     Last attempt to quit: 1983     Years since quittin.8   • Smokeless tobacco: Never Used   Substance and Sexual Activity   • Alcohol use: No     Alcohol/week: 0.0 oz     Comment: 1 X / Month   • Drug use: No   • Sexual activity: Not Currently     Partners: Female   Other Topics Concern   •  Service Not Asked   • Blood Transfusions Not Asked   • Caffeine Concern Not Asked   • Occupational Exposure Not Asked   • Hobby Hazards Not Asked   • Sleep Concern Not Asked   • Stress Concern Not Asked   • Weight Concern Not Asked   • Special Diet Not Asked   • Back Care Not Asked   • Exercise Not Asked   • Bike Helmet Not Asked   • Seat Belt No   • Self-Exams Not Asked   Social History Narrative   • Not on file     Current Facility-Administered Medications   Medication   • sodium chloride (NORMAL SALINE) 0.9 % bolus 100-200 mL   • albumin human (SPA) 25 % injection 12.5 g   • gabapentin (NEURONTIN) capsule 100 mg   • traMADol (ULTRAM) tablet 25 mg   • acetaminophen (TYLENOL) tablet 500 mg   • allopurinol (ZYLOPRIM) tablet 200 mg   • amLODIPine (NORVASC) tablet 10 mg   • aspirin (ECOTRIN) enteric coated tablet 81 mg   • atorvastatin (LIPITOR) tablet 80 mg   • B complex-vitamin C-folic acid (NEPHRO-CARLOS) tablet 0.8 mg   • carvedilol (COREG) tablet 12.5 mg   • cholecalciferol (VITAMIN D3) tablet 1,000 Units   • cloNIDine (CATAPRES) tablet 0.1 mg   • famotidine (PEPCID) tablet 20 mg   • ferrous sulfate (65 mg Fe per 325 mg) tablet 325 mg   • insulin detemir (LEVEMIR) injection 45 Units   • lidocaine (LIDOCARE) 4 % patch 1 patch   • predniSONE (DELTASONE) tablet 10 mg   • tamsulosin  (FLOMAX) capsule 0.4 mg   • acetaminophen (TYLENOL) tablet 650 mg    Or   • acetaminophen (TYLENOL) suppository 650 mg   • sodium chloride (NORMAL SALINE) 0.9 % bolus 500 mL   • heparin (porcine) injection 7,500 Units   • sodium chloride (PF) 0.9 % injection 2 mL   • sodium chloride (PF) 0.9 % injection 2 mL   • dextrose 50 % injection 25 g   • dextrose 5 % infusion   • glucagon (GLUCAGEN) injection 1 mg   • dextrose (GLUTOSE) 40 % gel 15 g   • insulin lispro (HumaLOG) sliding scale injection   • insulin lispro (HumaLOG) injection 10 Units   • lidocaine patch removal   • sodium chloride 0.9 % flush bag 500 mL   • potassium chloride (KLOR-CON M) trinh ER tablet 20 mEq   • potassium chloride (KLOR-CON) packet 20 mEq   • potassium chloride (KLOR-CON M) trinh ER tablet 40 mEq   • potassium chloride (KLOR-CON) packet 40 mEq   • magnesium sulfate 1 g in dextrose 5% 100 mL IVPB premix   • magnesium sulfate 2 g in 50 mL premix IVPB   • magnesium sulfate 2 g in 50 mL premix IVPB   • HYDROmorphone (DILAUDID) injection 0.2-0.5 mg   • HYDROcodone-acetaminophen (NORCO) 5-325 MG per tablet 1-2 tablet   • sodium hypochlorite (DAKIN'S) 0.062 % (1/8 strength) irrigation solution 1 application        understanding and agreed with this plan.    Droplet precautions were observed during this visit.  PPE was worn by me during the visit.  PPE included gown, double gloves, N95 mask, and face shield.  Vital signs were collected by me as well as any NP, or OP swabs if needed.    Greater than 30 minutes was spent in the consultation and direct patient care by me due to PPE use and COVID precautions.     Magan Hoyt PA-C  6/7/2020, 9:48 AM

## 2020-06-07 NOTE — PATIENT INSTRUCTIONS
Your symptoms show that you may have coronavirus (COVID-19). This illness can cause fever, cough and trouble breathing. Many people get a mild case and get better on their own. Some people can get very sick.     Not all patients are tested for COVID-19. If you need to be tested, your care team will let you know.     How can I protect others?    Without a test, we can't know for sure that you have COVID-19. For safety, it's very important to follow these rules.    Stay home and away from others (self-isolate) until:    At least 10 days have passed since your symptoms started. And     You've had no fever--and no medicine that reduces fever--for 3 full days (72 hours). And      Your other symptoms have resolved (gotten better).     During this time:    Stay in your own room (and use your own bathroom), if you can.    Stay away from others in your home. No hugging, kissing or shaking hands.    No visitors.    Don't go to work, school or anywhere else.     Clean  high touch  surfaces often (doorknobs, counters, handles, etc.). Use a household cleaning spray or wipes.    Cover your mouth and nose with a mask, tissue or wash cloth to avoid spreading germs.    Wash your hands and face often. Use soap and water.    For more tips, go to https://www.cdc.gov/coronavirus/2019-ncov/downloads/10Things.pdf.    How can I take care of myself?    1. Get lots of rest. Drink extra fluids (unless a doctor has told you not to).     2. Take Tylenol (acetaminophen) for fever or pain. If you have liver or kidney problems, ask your family doctor if it's okay to take Tylenol.     Adults can take either:     650 mg (two 325 mg pills) every 4 to 6 hours, or     1,000 mg (two 500 mg pills) every 8 hours as needed.     Note: Don't take more than 3,000 mg in one day.   Acetaminophen is found in many medicines (both prescribed and over-the-counter medicines). Read all labels to be sure you don't take too much.   For children, check the Tylenol  bottle for the right dose. The dose is based on the child's age or weight.    3. If you have other health problems (like cancer, heart failure, an organ transplant or severe kidney disease): Call your specialty clinic if you don't feel better in the next 2 days.    4. Know when to call 911: If your breathing is so bad that it keeps you from doing normal activities, call 911 or go to the emergency room. Tell them that you've been staying home and may have COVID-19.      What are the symptoms of COVID-19?     The most common symptoms are cough, fever and trouble breathing.     Less common symptoms include body aches, chills, diarrhea (loose, watery poops), fatigue (feeling very tired), headache, runny nose, sore throat and loss of smell.     COVID-19 can cause severe coughing (bronchitis) and lung infection (pneumonia).    How does it spread?     The virus may spread when a person coughs or sneezes into the air. The virus can travel about 6 feet this way, and it can live on surfaces.      Common  (household disinfectants) will kill the virus.    Who is at risk?  Anyone can catch COVID-19 if they're around someone who has the virus.    How can others protect themselves?     Stay away from people who have COVID-19 (or symptoms of COVID-19).    Wash hands often with soap and water. Or, use hand  with at least 60% alcohol.    Avoid touching the eyes, nose or mouth.     Wear a face mask when you go out in public, when sick or when caring for a sick person.      For more about COVID-19 and caring for yourself at home, please visit the CDC website at https://www.cdc.gov/coronavirus/2019-ncov/about/steps-when-sick.html.     To learn about care at [facility], go to [email or web address].    Below are the COVID-19 hotlines at the [place] Department [facility] ([facility]). Interpreters are available.     For health questions: Call 643-594-0570 or 1-735.961.2349 (7 a.m. to 7  p.m.)    For questions about schools and childcare: Call 482-970-7141 or 1-523.373.8122 (7 a.m. to 7 p.m.)      Patient Education     Bronchitis, Antibiotic Treatment (Adult)    Bronchitis is an infection of the air passages (bronchial tubes) in your lungs. It often occurs when you have a cold. This illness is contagious during the first few days and is spread through the air by coughing and sneezing, or by direct contact (touching the sick person and then touching your own eyes, nose, or mouth).  Symptoms of bronchitis include cough with mucus (phlegm) and low-grade fever. Bronchitis usually lasts 7 to 14 days. Mild cases can be treated with simple home remedies. More severe infection is treated with an antibiotic.  Home care  Follow these guidelines when caring for yourself at home:    If your symptoms are severe, rest at home for the first 2 to 3 days. When you go back to your usual activities, don't let yourself get too tired.    Don't smoke. Also stay away from secondhand smoke.    You may use over-the-counter medicines to control fever or pain, unless another medicine was prescribed. If you have chronic liver or kidney disease or have ever had a stomach ulcer or gastrointestinal bleeding, talk with your healthcare provider before using these medicines. Also talk to your provider if you are taking medicine to prevent blood clots. Aspirin should never be given to anyone younger than 18 who is ill with a viral infection or fever. It may cause severe liver or brain damage.    Your appetite may be low, so a light diet is fine. Stay well hydrated by drinking 6 to 8 glasses of fluids per day. This includes water, soft drinks, sports drinks, juices, tea, or soup. Extra fluids will help loosen mucus in your nose and lungs.    Over-the-counter cough, cold, and sore-throat medicines will not shorten the length of the illness, but they may be helpful to reduce your symptoms. Don't use decongestants if you have high blood  pressure.    Finish all antibiotic medicine. Do this even if you are feeling better after only a few days.  Follow-up care  Follow up with your healthcare provider, or as advised. If you had an X-ray or ECG (electrocardiogram), a specialist will review it. You will be told of any new test results that may affect your care.  If you are age 65 or older, if you smoke, or if you have a chronic lung disease or condition that affects your immune system, ask your healthcare provider about getting a pneumococcal vaccine and a yearly flu shot (influenza vaccine).  When to seek medical advice  Call your healthcare provider right away if any of these occur:    Fever of 100.4 F (38 C) or higher, or as directed by your healthcare provider    Coughing up more sputum    Weakness, drowsiness, headache, facial pain, ear pain, or a stiff neck  Call 911  Call 911 if any of these occur.    Coughing up blood    Weakness, drowsiness, headache, or stiff neck that get worse    Trouble breathing, wheezing, or pain with breathing  Date Last Reviewed: 6/1/2018 2000-2019 [employer]. [address] [place] [address], [place] [address]. All rights reserved. This information is not intended as a substitute for professional medical care. Always follow your healthcare professional's instructions.

## 2020-11-22 ENCOUNTER — HEALTH MAINTENANCE LETTER (OUTPATIENT)
Age: 45
End: 2020-11-22

## 2021-04-02 ENCOUNTER — ANCILLARY PROCEDURE (OUTPATIENT)
Dept: GENERAL RADIOLOGY | Facility: CLINIC | Age: 46
End: 2021-04-02
Attending: NURSE PRACTITIONER
Payer: COMMERCIAL

## 2021-04-02 ENCOUNTER — VIRTUAL VISIT (OUTPATIENT)
Dept: FAMILY MEDICINE | Facility: CLINIC | Age: 46
End: 2021-04-02
Payer: COMMERCIAL

## 2021-04-02 DIAGNOSIS — Z83.3 FAMILY HISTORY OF DIABETES MELLITUS: ICD-10-CM

## 2021-04-02 DIAGNOSIS — Z11.4 SCREENING FOR HIV (HUMAN IMMUNODEFICIENCY VIRUS): ICD-10-CM

## 2021-04-02 DIAGNOSIS — G25.81 RESTLESS LEG SYNDROME: ICD-10-CM

## 2021-04-02 DIAGNOSIS — Z13.6 CARDIOVASCULAR SCREENING; LDL GOAL LESS THAN 160: ICD-10-CM

## 2021-04-02 DIAGNOSIS — Z12.31 VISIT FOR SCREENING MAMMOGRAM: ICD-10-CM

## 2021-04-02 DIAGNOSIS — Z11.59 NEED FOR HEPATITIS C SCREENING TEST: ICD-10-CM

## 2021-04-02 DIAGNOSIS — E11.9 TYPE 2 DIABETES MELLITUS WITHOUT COMPLICATION, WITHOUT LONG-TERM CURRENT USE OF INSULIN (H): ICD-10-CM

## 2021-04-02 DIAGNOSIS — R05.9 COUGH: ICD-10-CM

## 2021-04-02 DIAGNOSIS — R06.83 SNORING: Primary | ICD-10-CM

## 2021-04-02 LAB
CHOLEST SERPL-MCNC: 177 MG/DL
ERYTHROCYTE [DISTWIDTH] IN BLOOD BY AUTOMATED COUNT: 15 % (ref 10–15)
GLUCOSE SERPL-MCNC: 102 MG/DL (ref 70–99)
HBA1C MFR BLD: 6.5 % (ref 0–5.6)
HCT VFR BLD AUTO: 35 % (ref 35–47)
HDLC SERPL-MCNC: 37 MG/DL
HGB BLD-MCNC: 11.4 G/DL (ref 11.7–15.7)
LDLC SERPL CALC-MCNC: 120 MG/DL
MCH RBC QN AUTO: 26.8 PG (ref 26.5–33)
MCHC RBC AUTO-ENTMCNC: 32.6 G/DL (ref 31.5–36.5)
MCV RBC AUTO: 82 FL (ref 78–100)
NONHDLC SERPL-MCNC: 140 MG/DL
PLATELET # BLD AUTO: 284 10E9/L (ref 150–450)
RBC # BLD AUTO: 4.26 10E12/L (ref 3.8–5.2)
TRIGL SERPL-MCNC: 102 MG/DL
WBC # BLD AUTO: 9 10E9/L (ref 4–11)

## 2021-04-02 PROCEDURE — 82947 ASSAY GLUCOSE BLOOD QUANT: CPT | Performed by: NURSE PRACTITIONER

## 2021-04-02 PROCEDURE — 86803 HEPATITIS C AB TEST: CPT | Performed by: NURSE PRACTITIONER

## 2021-04-02 PROCEDURE — 87389 HIV-1 AG W/HIV-1&-2 AB AG IA: CPT | Performed by: NURSE PRACTITIONER

## 2021-04-02 PROCEDURE — 85027 COMPLETE CBC AUTOMATED: CPT | Performed by: NURSE PRACTITIONER

## 2021-04-02 PROCEDURE — 99214 OFFICE O/P EST MOD 30 MIN: CPT | Mod: 95 | Performed by: NURSE PRACTITIONER

## 2021-04-02 PROCEDURE — 80061 LIPID PANEL: CPT | Performed by: NURSE PRACTITIONER

## 2021-04-02 PROCEDURE — 83036 HEMOGLOBIN GLYCOSYLATED A1C: CPT | Performed by: NURSE PRACTITIONER

## 2021-04-02 PROCEDURE — 36415 COLL VENOUS BLD VENIPUNCTURE: CPT | Performed by: NURSE PRACTITIONER

## 2021-04-02 PROCEDURE — 71046 X-RAY EXAM CHEST 2 VIEWS: CPT | Performed by: RADIOLOGY

## 2021-04-02 NOTE — PROGRESS NOTES
Berhane is a 45 year old who is being evaluated via a billable video visit.      How would you like to obtain your AVS? MyChart  If the video visit is dropped, the invitation should be resent by: Text to cell phone: [phone]  Will anyone else be joining your video visit? No    Video Start Time: 7:32 AM    Assessment & Plan     Cough  Getting Chest film, we discussed differential and for need to be evaluated in clinic. Deferring CT scan for now. Advised her to stay well hydrated, take OTC CLaritin ofr nasal congestion, reviewed indications for return to clinic/UC visit- fever, shortness of breath, chest pain/tightness, purulent sputum production, vomiting, etc.  - XR Chest 2 Views    Snoring  Referring for sleep study as she snores loudly, sleep is not restorative and  She has large neck circumference.  - SLEEP EVALUATION & MANAGEMENT REFERRAL - ADULT -[facility] - [place]  [phone] (Age 15 and up)    Restless leg syndrome  Checking labs.  - **Glucose FUTURE anytime  - **CBC with platelets FUTURE anytime    Family history of diabetes mellitus  DM in Father, sister, and PGM.  She is currently asymptomatic but has gained weight  Over COVID-19 pandemic.   - **A1C FUTURE anytime    CARDIOVASCULAR SCREENING; LDL GOAL LESS THAN 160    - Lipid panel reflex to direct LDL Non-fasting    Screening for HIV (human immunodeficiency virus)    - HIV Antigen Antibody Combo    Need for hepatitis C screening test    - Hepatitis C Screen Reflex to HCV RNA Quant and Genotype    Visit for screening mammogram    - MA SCREENING DIGITAL BILAT - Future  (s+30)  [id number]}     Work on weight loss  Regular exercise  See Patient Instructions    No follow-ups on file.    ABNER Molina CNP  [facility]    Subjective   Berhane is a 45 year old who presents for the following health issues   HPI     Acute Illness  Acute illness concerns: ongoing Cough   Onset/Duration: on and off for over a year    Symptoms:  Fever: no  Chills/Sweats: no  Headache (location?): no  Sinus Pressure: no  Conjunctivitis:  no  Ear Pain: no  Rhinorrhea: no  Congestion: YES-clear  Sore Throat: no  Cough: YES   Wheeze: no  Decreased Appetite: no  Nausea: no  Vomiting: no  Diarrhea: no  Dysuria/Freq.: no  Dysuria or Hematuria: no  Fatigue/Achiness: no  Sick/Strep Exposure: no  Therapies tried and outcome: dayquil every other day and OTC DM      -patient states that she would like to get some images done for her cough, possibly a CT    -patient has been having cough on and off for almost two years now. Cough will be relieved for about one month and return. Sometimes worsening to the point of where patient has to come in to be seen for medication. She was treated for acute bronchitis with Zithromax and albuterol 6/2020 and for sinusitis 6/2019 with Augmentin.  CXR done at both visits was negative.  She admits to seasonal allergies for which she takes OTC Claritin periodically with improvement in nasal congestion and cough.  She denies reflux symptoms, no chest pain/tightness, admits to nighttime wheezing.  She snores loudly - family states her snoring is getting worse.  She admits to weight gain since COVID-19 pandemic.        Review of Systems   Constitutional, HEENT, cardiovascular, pulmonary, gi and gu systems are negative, except as otherwise noted.      Objective           Vitals:  No vitals were obtained today due to virtual visit.    Physical Exam   GENERAL: Healthy, alert and no distress  EYES: Eyes grossly normal to inspection.  No discharge or erythema, or obvious scleral/conjunctival abnormalities.  HENT: normal cephalic/atraumatic  NECK: No asymmetry, visible masses or scars  RESP: No audible wheeze, cough, or visible cyanosis.  No visible retractions or increased work of breathing.    MS: No gross musculoskeletal defects noted.  Normal range of motion.  No visible edema.  SKIN: Visible skin clear. No significant rash,  abnormal pigmentation or lesions.  NEURO: Cranial nerves grossly intact.  Mentation and speech appropriate for age.  PSYCH: Mentation appears normal, affect normal/bright, judgement and insight intact, normal speech and appearance well-groomed.    No results found.          Video-Visit Details    Type of service:  Video Visit    Video End Time:8:00 AM    Originating Location (pt. Location): Home    Distant Location (provider location):  [facility]     Platform used for Video Visit: [employer]

## 2021-04-02 NOTE — PATIENT INSTRUCTIONS
At [facility], we strive to deliver an exceptional experience to you, every time we see you. If you receive a survey, please complete it as we do value your feedback.  If you have MyChart, you can expect to receive results automatically within 24 hours of their completion.  Your provider will send a note interpreting your results as well.   If you do not have MyChart, you should receive your results in about a week by mail.    Your care team:                            Family Medicine Internal Medicine   MD London Evans MD Shantel Branch-Fleming, MD Srinivasa Vaka, MD Katya Belousova, PAJOAN Brock, APRN CNP    Nolan Mitchell, MD Pediatrics   Hao Kaur, PAJOAN Kahn, CNP MD Ani Camacho APRN CNP   MD Nellie Quintanilla MD Deborah Mielke, MD Yoana Gregory, APRN [facility]      Clinic hours: Monday - Thursday 7 am-6 pm; Fridays 7 am-5 pm.   Urgent care: Monday - Friday 11 am-9 pm; Saturday and Sunday 9 am-5 pm.    Clinic: [phone]       House Pharmacy: Monday - Thursday 8 am - 7 pm; Friday 8 am - 6 pm  [facility]: [phone]     Use www.[email or web address].org for 24/7 diagnosis and treatment of dozens of conditions.    Patient Education     Chronic Cough with Uncertain Cause (Adult)  Everyone has had a cough as part of the common cold, flu, or bronchitis. This kind of cough occurs along with an achy feeling, low-grade fever, nasal and sinus congestion, and a scratchy or sore throat. This usually gets better in 2 to 3 weeks. A cough that lasts longer than 3 weeks may be due to other causes. Your healthcare provider may refer to this as a chronic cough.   If your cough does not improve over the next 2 weeks, further testing may be needed. Follow up with your healthcare provider as advised. Cough suppressants may be recommended. Based on your exam today, the exact cause of  your cough is not certain. Below are some common causes for persistent cough.   Smoker's cough  Smoker s cough doesn t go away. If you continue to smoke, it only gets worse. The cough is from irritation in the air passages. Talk to your healthcare provider about quitting. Medicines or nicotine-replacement products, like gum or the patch, may make quitting easier.   Postnasal drip  A cough that is worse at night may be due to postnasal drip. Excess mucus in the nose drains from the back of your nose to your throat. This triggers the cough reflex. Postnasal drip may be due to a sinus infection or allergy. Common allergens include dust, tobacco smoke (both inhaled and secondhand smoke), environmental pollutants, pollen, mold, pets, cleaning agents, room deodorizers, and chemical fumes. Over-the-counter antihistamines or decongestants may be helpful for allergies. A sinus infection may requires antibiotic treatment. See your healthcare provider if symptoms continue.     Medicines  Certain prescribed medicines can cause a chronic cough in some people:    ACE inhibitors for high blood pressure. These include benazepril, captopril, enalapril, fosinopril, lisinopril, quinapril, ramipril, and others.    Beta-blockers for high blood pressure and other conditions. These include propranolol, atenolol, metoprolol, nadolol, and others.  Let your healthcare provider know if you are taking any of these. The chronic cough may mean your medicine needs to be changed.   Asthma  Cough may be the only sign of mild asthma. You may have tests to find out if asthma is causing your cough. You may also take asthma medicine on a trial basis.   Acid reflux (heartburn, GERD)  The esophagus is the tube that carries food from the mouth to the stomach. A valve at its lower end prevents stomach acids from flowing upward. If this valve does not work properly, acid from the stomach enters the esophagus. This may cause a burning pain in the upper  abdomen or lower chest, belching, or cough. Symptoms are often worse when lying flat. Avoid eating or drinking before bedtime. Try using extra pillows to raise your upper body, or place 4-inch blocks under the head of your bed. You may try an over-the-counter (OTC) antacid or an acid-blocking medicine such as famotidine, cimetidine, esomeprazole, lansoprazole, or omeprazole. Stronger medicines for this condition can be prescribed by your healthcare provider. Ask your healthcare provider which OTC medicine to use. Depending on your current medicines, some OTC medicines may cause drug interactions and should be avoided.   Follow-up care  Follow up with your healthcare provider, or as advised, if your cough does not improve. Further testing may be needed.   Note: If an X-ray was taken, a specialist will review it. You will be notified of any new findings that may affect your care.   When to seek medical advice  Call your healthcare provider right away if any of these occur:    Mild wheezing or difficulty breathing    Fever of 100.4 F (38 C) or higher, or as directed by your healthcare provider    Unexpected weight loss    Coughing up large amounts of colored sputum or blood-tinged sputum    Night sweats (sheets and pajamas get soaking wet)  Call 911  Call 911 if any of these occur:     Coughing up blood    Moderate to severe trouble breathing or wheezing  Jose M last reviewed this educational content on 6/1/2018 2000-2020 [employer]. All rights reserved. This information is not intended as a substitute for professional medical care. Always follow your healthcare professional's instructions.           Patient Education     What Are Snoring and Obstructive Sleep Apnea?  If you ve ever had a stuffed-up nose, you know the feeling of trying to breathe through a very narrow passageway. This is what happens in your throat when you snore. While you sleep, structures in your throat partly block your air  passage. This makes the passage narrow and hard to breathe through. In some cases, the entire passage may become blocked so you can t breathe at all. This is called obstructive sleep apnea.      Snoring       Obstructive sleep apnea      Snoring  If your throat structures are too large or the muscles relax too much during sleep, the air passage may be partly blocked for a brief moment. But the air eventually passes through. As air from the nose or mouth passes around this blockage, the throat structures vibrate. This causes the familiar sound of snoring. This snoring sound can be loud and may wake up others, or even themselves, during the night. Snoring gets worse as more and more of the air passage is blocked.   Obstructive sleep apnea  If the structures partly or completely block the throat, air can t flow to the lungs at all. This is called hypopnea (decreased breathing) or apnea (meaning  no breathing ). The lungs aren t getting fresh air. So the brain tells the body to wake up just enough to tighten the muscles and unblock the air passage. With a loud gasp, breathing starts again. This process may be repeated over and over again during the night. This can make your sleep fragmented with lighter stages of sleep. You may not remember waking up many times during the night however due to lighter sleep you will most likely feel tired the next day. The lack of sleep and fresh air can also strain your lungs, heart, and other organs. This may lead to problems such as high blood pressure, diabetes, behavioral disorders, heart attack, or stroke.   Problems in the nose and jaw  Problems in the structure of the nose may block breathing. A crooked (deviated) septum or swollen turbinates can make snoring worse or lead to apnea. Also, a receding jaw may make the tongue sit too far back. Then it s more likely to block the airway when you re asleep.   Jose M last reviewed this educational content on 9/1/2019 2000-2020 [employer]  [employer]. All rights reserved. This information is not intended as a substitute for professional medical care. Always follow your healthcare professional's instructions.

## 2021-04-03 LAB
HCV AB SERPL QL IA: NONREACTIVE
HIV 1+2 AB+HIV1 P24 AG SERPL QL IA: NONREACTIVE

## 2021-04-04 ENCOUNTER — HEALTH MAINTENANCE LETTER (OUTPATIENT)
Age: 46
End: 2021-04-04

## 2021-04-05 RX ORDER — LANCETS
EACH MISCELLANEOUS
Qty: 100 EACH | Refills: 6 | Status: SHIPPED | OUTPATIENT
Start: 2021-04-05 | End: 2021-04-27

## 2021-04-05 RX ORDER — GLUCOSAMINE HCL/CHONDROITIN SU 500-400 MG
CAPSULE ORAL
Qty: 100 EACH | Refills: 3 | Status: SHIPPED | OUTPATIENT
Start: 2021-04-05 | End: 2021-04-27

## 2021-04-06 ENCOUNTER — TELEPHONE (OUTPATIENT)
Dept: FAMILY MEDICINE | Facility: CLINIC | Age: 46
End: 2021-04-06

## 2021-04-06 NOTE — TELEPHONE ENCOUNTER
RECORDS RECEIVED FROM: Internal   DATE RECEIVED: 5.25.21   NOTES STATUS DETAILS   OFFICE NOTE from referring provider Internal 4.2.21 [facility]   OFFICE NOTE from other specialist N/A    DISCHARGE SUMMARY from hospital N/A    DISCHARGE REPORT from the ER N/A    MEDICATION LIST Internal    IMAGING  (NEED IMAGES AND REPORTS)     CT SCAN N/A    CHEST XRAY (CXR) Internal 4.2.21  6.7.20  6.18.19   TESTS     PULMONARY FUNCTION TESTING (PFT) N/A

## 2021-04-06 NOTE — TELEPHONE ENCOUNTER
Diabetes Education Scheduling Outreach #1:    Call to patient to schedule. Left message with phone number to call to schedule.    Plan for 2nd outreach attempt within 1 week.    Clau Murray  [facility]  Diabetes and Nutrition Scheduling

## 2021-04-13 NOTE — TELEPHONE ENCOUNTER
Diabetes Education Scheduling Outreach #2:    Call to patient to schedule. Left message with phone number to call to schedule.    Clau Murray  [facility]  Diabetes and Nutrition Scheduling

## 2021-04-15 ENCOUNTER — TELEPHONE (OUTPATIENT)
Dept: FAMILY MEDICINE | Facility: CLINIC | Age: 46
End: 2021-04-15

## 2021-04-15 ENCOUNTER — MYC MEDICAL ADVICE (OUTPATIENT)
Dept: FAMILY MEDICINE | Facility: CLINIC | Age: 46
End: 2021-04-15

## 2021-04-15 NOTE — TELEPHONE ENCOUNTER
Patient Quality Outreach      Summary:    Patient has the following on her problem list/HM: None    Patient is due/failing the following:   Eye Exam and Microalbumin and Adult/Adolescent physical, date due: 2/21/2021    Type of outreach:    Phone, left message for patient/parent to call back. and Sent [employer] message.    Questions for provider review:    None                                                                                                                                     Leydi Franklin MA on 4/15/2021 at 12:18 PM       Chart routed to .

## 2021-04-27 ENCOUNTER — TELEPHONE (OUTPATIENT)
Dept: FAMILY MEDICINE | Facility: CLINIC | Age: 46
End: 2021-04-27

## 2021-04-27 DIAGNOSIS — E11.9 TYPE 2 DIABETES MELLITUS WITHOUT COMPLICATION, WITHOUT LONG-TERM CURRENT USE OF INSULIN (H): ICD-10-CM

## 2021-04-27 RX ORDER — LANCETS
EACH MISCELLANEOUS
Qty: 100 EACH | Refills: 6 | Status: SHIPPED | OUTPATIENT
Start: 2021-04-27 | End: 2024-06-12

## 2021-04-27 RX ORDER — GLUCOSAMINE HCL/CHONDROITIN SU 500-400 MG
CAPSULE ORAL
Qty: 100 EACH | Refills: 3 | Status: SHIPPED | OUTPATIENT
Start: 2021-04-27 | End: 2024-06-12

## 2021-04-27 NOTE — TELEPHONE ENCOUNTER
The patient would like her testing supplies sent to a new pharmacy as where they were originally sent is not covered by her insurance. This was done and she is aware. Thank you. Jeane Linda R.N.

## 2021-04-27 NOTE — PROGRESS NOTES
SUBJECTIVE:   CC: Berhane Murray is an 45 year old woman who presents for preventive health visit.     Patient has been advised of split billing requirements and indicates understanding: Yes  Healthy Habits:     Getting at least 3 servings of Calcium per day:  Yes    Bi-annual eye exam:  Yes    Dental care twice a year:  Yes    Sleep apnea or symptoms of sleep apnea:  Daytime drowsiness    Diet:  Regular (no restrictions)    Frequency of exercise:  1 day/week    Duration of exercise:  15-30 minutes    Taking medications regularly:  Yes    Medication side effects:  None    PHQ-2 Total Score: 0    Additional concerns today:  No    1)Pt would like diabetes check/labs, father , sister and PGM all had diabetes also wants thyroid levels checked. Pt also wants to do pap today, has mammogram scheduled for 5/11/21.  2) cough started on and off for two years , she has had CXR normal , mostly dry but then she has had a little amuont of clear mucous , took Claritin D worked , helped with the cough and now she has wheezing   Hx of asthma when she was a child , end of May she has an appointment with pulmonologist   3) allergic rhinitis to pollen but has never seen an allergist or tested for allergies         As above     Today's PHQ-2 Score:   PHQ-2 ( 1999 Pf[employer]) 4/28/2021   Q1: Little interest or pleasure in doing things 0   Q2: Feeling down, depressed or hopeless 0   PHQ-2 Score 0   Q1: Little interest or pleasure in doing things Not at all   Q2: Feeling down, depressed or hopeless Not at all   PHQ-2 Score 0       Abuse: Current or Past (Physical, Sexual or Emotional) - No  Do you feel safe in your environment? Yes    Have you ever done Advance Care Planning? (For example, a Health Directive, POLST, or a discussion with a medical provider or your loved ones about your wishes): No, advance care planning information given to patient to review.  Patient declined advance care planning discussion at this time.    Social History      Tobacco Use     Smoking status: Never Smoker     Smokeless tobacco: Never Used   Substance Use Topics     Alcohol use: Yes     If you drink alcohol do you typically have >3 drinks per day or >7 drinks per week? No    Alcohol Use 4/28/2021   Prescreen: >3 drinks/day or >7 drinks/week? No   Prescreen: >3 drinks/day or >7 drinks/week? -   No flowsheet data found.    Reviewed orders with patient.  Reviewed health maintenance and updated orders accordingly - Yes  Lab work is in process  Labs reviewed in EPIC  BP Readings from Last 3 Encounters:   04/28/21 129/89   06/07/20 124/82   02/21/20 118/81    Wt Readings from Last 3 Encounters:   04/28/21 100.8 kg (222 lb 3.2 oz)   02/21/20 95 kg (209 lb 8 oz)   06/18/19 92.4 kg (203 lb 11.2 oz)                  Patient Active Problem List   Diagnosis     CARDIOVASCULAR SCREENING; LDL GOAL LESS THAN 160     Body mass index 33.0-33.9, adult     Obesity, unspecified obesity severity, unspecified obesity type     Cervical high risk HPV (human papillomavirus) test positive     Diabetes mellitus, type 2 (H)     Morbid obesity (H)     Allergic rhinitis due to pollen, unspecified seasonality     Wheezing     History reviewed. No pertinent surgical history.    Social History     Tobacco Use     Smoking status: Never Smoker     Smokeless tobacco: Never Used   Substance Use Topics     Alcohol use: Yes     Family History   Problem Relation Age of Onset     Breast Cancer Mother          Current Outpatient Medications   Medication Sig Dispense Refill     albuterol (PROAIR HFA/PROVENTIL HFA/VENTOLIN HFA) 108 (90 Base) MCG/ACT inhaler Inhale 2 puffs into the lungs every 6 hours 1 Inhaler 0     alcohol swab prep pads Use to swab area of injection/jacqueline as directed. 100 each 3     blood glucose (NO BRAND SPECIFIED) test strip Use to test blood sugar 1 times daily or as directed. To accompany: Blood Glucose Monitor Brands: per insurance. 100 strip 6     blood glucose calibration (NO BRAND  SPECIFIED) solution To accompany: Blood Glucose Monitor Brands: per insurance. 1 each 1     blood glucose monitoring (NO BRAND SPECIFIED) meter device kit Use to test blood sugar 1 times daily or as directed. Preferred blood glucose meter OR supplies to accompany: Blood Glucose Monitor Brands: per insurance. 1 kit 0     thin (NO BRAND SPECIFIED) lancets Use with lanceting device. To accompany: Blood Glucose Monitor Brands: per insurance. 100 each 6       Breast Cancer Screening:  Any new diagnosis of family breast, ovarian, or bowel cancer? No    FSH-7:   Breast CA Risk Assessment (FHS-7) 4/28/2021   Did any of your first-degree relatives have breast or ovarian cancer? No   Did any of your relatives have bilateral breast cancer? No   Did any man in your family have breast cancer? Yes   Did any woman in your family have breast and ovarian cancer? Yes   Did any woman in your family have breast cancer before age 50 y? No   Do you have 2 or more relatives with breast and/or ovarian cancer? No   Do you have 2 or more relatives with breast and/or bowel cancer? No       Mammogram Screening: Recommended annual mammography  Pertinent mammograms are reviewed under the imaging tab.    History of abnormal Pap smear: NO - age 30- 65 PAP every 3 years recommended  PAP / HPV Latest Ref Rng & Units 12/21/2018 8/4/2017 2/20/2014   PAP - NIL NIL NIL   HPV 16 DNA NEG:Negative Negative Negative -   HPV 18 DNA NEG:Negative Negative Negative -   OTHER HR HPV NEG:Negative Negative Positive(A) -     Reviewed and updated as needed this visit by clinical staff                 Reviewed and updated as needed this visit by Provider                Past Medical History:   Diagnosis Date     Cervical high risk HPV (human papillomavirus) test positive 08/04/2017 08/04/17: NIL pap, + HR HPV (not 16 or 18) result.       History reviewed. No pertinent surgical history.    Review of Systems  CONSTITUTIONAL: NEGATIVE for fever, chills, change in  weight  INTEGUMENTARU/SKIN: NEGATIVE for worrisome rashes, moles or lesions  EYES: NEGATIVE for vision changes or irritation  ENT: NEGATIVE for ear, mouth and throat problems  RESP: NEGATIVE for significant cough or SOB  BREAST: NEGATIVE for masses, tenderness or discharge  CV: NEGATIVE for chest pain, palpitations or peripheral edema  GI: NEGATIVE for nausea, abdominal pain, heartburn, or change in bowel habits  : NEGATIVE for unusual urinary or vaginal symptoms. Periods are regular.  MUSCULOSKELETAL: NEGATIVE for significant arthralgias or myalgia  NEURO: NEGATIVE for weakness, dizziness or paresthesias  PSYCHIATRIC: NEGATIVE for changes in mood or affect     OBJECTIVE:   There were no vitals taken for this visit.  Physical Exam  GENERAL: healthy, alert and no distress  EYES: Eyes grossly normal to inspection, PERRL and conjunctivae and sclerae normal  HENT: ear canals and TM's normal, nose and mouth without ulcers or lesions  NECK: no adenopathy, no asymmetry, masses, or scars and thyroid normal to palpation  RESP: lungs clear to auscultation - no rales, rhonchi or wheezes  BREAST: normal without masses, tenderness or nipple discharge and no palpable axillary masses or adenopathy  CV: regular rate and rhythm, normal S1 S2, no S3 or S4, no murmur, click or rub, no peripheral edema and peripheral pulses strong  ABDOMEN: soft, nontender, no hepatosplenomegaly, no masses and bowel sounds normal   (female): normal female external genitalia, normal urethral meatus, vaginal mucosa pink, moist, well rugated, and normal cervix/adnexa/uterus without masses or discharge  MS: no gross musculoskeletal defects noted, no edema  SKIN: no suspicious lesions or rashes  NEURO: Normal strength and tone, mentation intact and speech normal  PSYCH: mentation appears normal, affect normal/bright    Diagnostic Test Results:  Labs reviewed in Epic  Results for orders placed or performed in visit on 04/28/21 (from the past 24 hour(s))  "  Hemoglobin A1c   Result Value Ref Range    Hemoglobin A1C 6.2 (H) 0 - 5.6 %       ASSESSMENT/PLAN:   1. Routine general medical examination at a health care facility  Discussed diet,calcium,exercise.Went over self breast exam.Thin prep was done.Eyes and teeth UTD.No immunizations needed today.See orders below for tests ordered and screening needed.    - Pap imaged thin layer screen with HPV - recommended age 30 - 65 years (select HPV order below)  - TSH with free T4 reflex  - Lipid panel reflex to direct LDL Fasting    2. Type 2 diabetes mellitus without complication, without long-term current use of insulin (H)  Will check labs today , she wants to do diet and exercise first and I have given her a referral for diabetic educator   - Hemoglobin A1c  - Albumin Random Urine Quantitative with Creat Ratio  - NUTRITION REFERRAL    3. Wheezing  She is not wheezing currently but gave her a referral for allergist to do the testing , she has been taking the OTC allergy meds and using Albuterol inhaler as needed   - ALLERGY/ASTHMA ADULT REFERRAL    4. Allergic rhinitis due to pollen, unspecified seasonality  As above she will get the official testing   - ALLERGY/ASTHMA ADULT REFERRAL    5. Morbid obesity (H)  Discussed diet and exercise and also dietician referral given as pt wants to try this before any diabetes medications       Patient has been advised of split billing requirements and indicates understanding: Yes  COUNSELING:  Reviewed preventive health counseling, as reflected in patient instructions       Regular exercise       Healthy diet/nutrition       Vision screening    Estimated body mass index is 36.82 kg/m  as calculated from the following:    Height as of 2/21/20: 1.607 m (5' 3.25\").    Weight as of 2/21/20: 95 kg (209 lb 8 oz).        She reports that she has never smoked. She has never used smokeless tobacco.      Counseling Resources:  ATP IV Guidelines  Pooled Cohorts Equation Calculator  Breast Cancer " Risk Calculator  BRCA-Related Cancer Risk Assessment: FHS-7 Tool  FRAX Risk Assessment  ICSI Preventive Guidelines  Dietary Guidelines for Americans, 2010  USDA's MyPlate  ASA Prophylaxis  Lung CA Screening    Shabana Toribio MD  [facility]

## 2021-04-28 ENCOUNTER — OFFICE VISIT (OUTPATIENT)
Dept: FAMILY MEDICINE | Facility: CLINIC | Age: 46
End: 2021-04-28
Payer: COMMERCIAL

## 2021-04-28 VITALS
WEIGHT: 222.2 LBS | BODY MASS INDEX: 39.37 KG/M2 | OXYGEN SATURATION: 95 % | HEIGHT: 63 IN | TEMPERATURE: 97.1 F | SYSTOLIC BLOOD PRESSURE: 129 MMHG | RESPIRATION RATE: 22 BRPM | HEART RATE: 83 BPM | DIASTOLIC BLOOD PRESSURE: 89 MMHG

## 2021-04-28 DIAGNOSIS — J30.1 ALLERGIC RHINITIS DUE TO POLLEN, UNSPECIFIED SEASONALITY: ICD-10-CM

## 2021-04-28 DIAGNOSIS — Z00.00 ROUTINE GENERAL MEDICAL EXAMINATION AT A HEALTH CARE FACILITY: Primary | ICD-10-CM

## 2021-04-28 DIAGNOSIS — E66.01 MORBID OBESITY (H): ICD-10-CM

## 2021-04-28 DIAGNOSIS — E11.9 TYPE 2 DIABETES MELLITUS WITHOUT COMPLICATION, WITHOUT LONG-TERM CURRENT USE OF INSULIN (H): ICD-10-CM

## 2021-04-28 DIAGNOSIS — R06.2 WHEEZING: ICD-10-CM

## 2021-04-28 LAB
CHOLEST SERPL-MCNC: 174 MG/DL
CREAT UR-MCNC: 131 MG/DL
HBA1C MFR BLD: 6.2 % (ref 0–5.6)
HDLC SERPL-MCNC: 48 MG/DL
LDLC SERPL CALC-MCNC: 112 MG/DL
MICROALBUMIN UR-MCNC: 8 MG/L
MICROALBUMIN/CREAT UR: 6.3 MG/G CR (ref 0–25)
NONHDLC SERPL-MCNC: 126 MG/DL
TRIGL SERPL-MCNC: 70 MG/DL
TSH SERPL DL<=0.005 MIU/L-ACNC: 0.64 MU/L (ref 0.4–4)

## 2021-04-28 PROCEDURE — 83036 HEMOGLOBIN GLYCOSYLATED A1C: CPT | Performed by: FAMILY MEDICINE

## 2021-04-28 PROCEDURE — 99396 PREV VISIT EST AGE 40-64: CPT | Performed by: FAMILY MEDICINE

## 2021-04-28 PROCEDURE — 80061 LIPID PANEL: CPT | Performed by: FAMILY MEDICINE

## 2021-04-28 PROCEDURE — 87624 HPV HI-RISK TYP POOLED RSLT: CPT | Performed by: FAMILY MEDICINE

## 2021-04-28 PROCEDURE — 82043 UR ALBUMIN QUANTITATIVE: CPT | Performed by: FAMILY MEDICINE

## 2021-04-28 PROCEDURE — 36415 COLL VENOUS BLD VENIPUNCTURE: CPT | Performed by: FAMILY MEDICINE

## 2021-04-28 PROCEDURE — G0145 SCR C/V CYTO,THINLAYER,RESCR: HCPCS | Performed by: FAMILY MEDICINE

## 2021-04-28 PROCEDURE — 84443 ASSAY THYROID STIM HORMONE: CPT | Performed by: FAMILY MEDICINE

## 2021-04-28 PROCEDURE — 99213 OFFICE O/P EST LOW 20 MIN: CPT | Mod: 25 | Performed by: FAMILY MEDICINE

## 2021-04-28 ASSESSMENT — MIFFLIN-ST. JEOR: SCORE: 1620.98

## 2021-04-30 ENCOUNTER — ALLIED HEALTH/NURSE VISIT (OUTPATIENT)
Dept: EDUCATION SERVICES | Facility: CLINIC | Age: 46
End: 2021-04-30
Payer: COMMERCIAL

## 2021-04-30 DIAGNOSIS — E11.9 DIABETES MELLITUS WITHOUT COMPLICATION (H): Primary | ICD-10-CM

## 2021-04-30 LAB
COPATH REPORT: NORMAL
PAP: NORMAL

## 2021-04-30 PROCEDURE — G0108 DIAB MANAGE TRN  PER INDIV: HCPCS | Performed by: INTERNAL MEDICINE

## 2021-05-03 LAB
FINAL DIAGNOSIS: ABNORMAL
HPV HR 12 DNA CVX QL NAA+PROBE: POSITIVE
HPV16 DNA SPEC QL NAA+PROBE: NEGATIVE
HPV18 DNA SPEC QL NAA+PROBE: NEGATIVE
SPECIMEN DESCRIPTION: ABNORMAL
SPECIMEN SOURCE CVX/VAG CYTO: ABNORMAL

## 2021-05-04 ENCOUNTER — PATIENT OUTREACH (OUTPATIENT)
Dept: FAMILY MEDICINE | Facility: CLINIC | Age: 46
End: 2021-05-04
Payer: COMMERCIAL

## 2021-05-11 NOTE — PROGRESS NOTES
"Diabetes Self Management Training: Individual Review Visit    Berhane Murray presents today for education related to Pre-diabetes.    She is accompanied by self    Patient's diabetes management related comments/concerns: None at this time.     Patient's emotional response to diabetes: expresses readiness to learn    Patient would like this visit to be focused around the following diabetes-related behaviors and goals: N/a.     ASSESSMENT:  Patient referred by Pcp for pre-diabetes self-mgmt education. Diagnosed, 2021. Positive DM in father, sister, pgm. Ht: 5'3\". Weight: 222#. Works full time at a desk job. Lives with spouse and daughter.     Current Diabetes Management per Patient:  Taking diabetes medications? No.       Do you have any difficulty affording your medications or glucose monitoring supplies? N/a.     Patient glucose self monitoring as follows: Rx'd One Touch Verio by Pcp. Cde taught pt how to use meter and lancing device. Did return demonstration. B - fasting.         Lab Results   Component Value Date    A1C 6.2 2021        Nutrition:  Patient eats 3 meals per day. Eats out mutiple times a week. Has nutrition appt in . States \"not really a sweet person\". Stopped drinking soda after finding out about diagnosis.     Breakfast - Smoothie with spinach and fruit, 1% milk or Greek yogurt.   Lunch - mixed green salad   Dinner - Usually out   Snacks - chips    Beverages: Black coffee in the AM, water, unsweetened tea, cocktail when eating out.     Cultural/Anabaptist diet restrictions: No     Biggest Challenge to Healthy Eating: eating out    Physical Activity:    Not discussed today.     Diabetes Risk Factors:  Family history  Age over 45 years  Ethnicity, hypertension  Overweight/obesity  Inactivity    Relevant co-morbidities and related health problems:  Significant for:  None     Diabetes Complications:  Not discussed today.    Recent health service and resource utilization related to " "diabetes (hyperglycemia, hypoglycemia, etc):   None    Vitals:  There were no vitals taken for this visit.  Estimated body mass index is 39.05 kg/m  as calculated from the following:    Height as of 4/28/21: 1.607 m (5' 3.25\").    Weight as of 4/28/21: 100.8 kg (222 lb 3.2 oz).   Last 3 BP:   BP Readings from Last 3 Encounters:   04/28/21 129/89   06/07/20 124/82   02/21/20 118/81       History   Smoking Status     Never Smoker   Smokeless Tobacco     Never Used       Labs:  Lab Results   Component Value Date    A1C 6.2 04/28/2021     Lab Results   Component Value Date     04/02/2021     Lab Results   Component Value Date     04/28/2021     HDL Cholesterol   Date Value Ref Range Status   04/28/2021 48 (L) >49 mg/dL Final   ]  GFR Estimate   Date Value Ref Range Status   02/20/2014 >90 >60 mL/min/1.7m2 Final     GFR Estimate If Black   Date Value Ref Range Status   02/20/2014 >90 >60 mL/min/1.7m2 Final     Lab Results   Component Value Date    CR 0.64 02/20/2014       Socio/Economic/Cultural Considerations:    Support system: spouse    Cultural Influences/Ethnic Background:  Not  or       Health Literacy/Numeracy:  \"With diabetes, it's helpful to use forms and log books to write down blood sugars and what you're eating at times to help understand how foods affect your blood sugars. With this in mind how confident are you at filling out medical forms, such as these, by yourself?  Not Assessed    Health Beliefs and Attitudes:   Patient Activation Measure Survey Score:  MELANIE Score (Last Two) 1/20/2016   MELANIE Raw Score 39   Activation Score 56.4   MELANIE Level 3       Stage of Change: CONTEMPLATION (Considering change and yet undecided)      Diabetes knowledge and skills assessment:     Patient is knowledgeable in diabetes management concepts related to: New diagnosis of pre-diabetes.     Patient needs further education on the following diabetes management concepts: Healthy Eating and Being " Active    Barriers to Learning: None.     INTERVENTION:   Education provided today on:  Monitoring: See above.   Healthy Eating  Being Active    Pt verbalized understanding of concepts discussed and recommendations provided today.       Education Materials Provided:  [facility] Healthy Living with Diabetes Book    PLAN:  Healthy Eating:   - Combine source of protein with a carb for a snack  - Reduce portion size, halie when eating out.   - Limit alcohol to 1 beverage a day  Being Active: Walk daily for 15 minutes, when possible.        FOLLOW-UP:  Ongoing plan for education and support: Return to see Cde in 3 mos.     Time Spent: 60 minutes  Encounter Type: Individual    Any diabetes medication dose changes were made via the CDE Protocol and Collaborative Practice Agreement with the patient's primary care provider. A copy of this encounter was shared with the provider.    Berhane MISHRA Trevor comes into clinic today at the request of AMANDA Gregory NP,  Ordering Provider for Pt Teaching on pre-diabetes self mgmt care.     This service provided today was under the supervising provider of the day Dr Negrete, who was available if needed.    Paulina Keller, RN, BSN, CDE   [facility]

## 2021-05-13 ENCOUNTER — OFFICE VISIT (OUTPATIENT)
Dept: ALLERGY | Facility: CLINIC | Age: 46
End: 2021-05-13
Payer: COMMERCIAL

## 2021-05-13 VITALS
HEART RATE: 82 BPM | DIASTOLIC BLOOD PRESSURE: 83 MMHG | SYSTOLIC BLOOD PRESSURE: 135 MMHG | WEIGHT: 222 LBS | BODY MASS INDEX: 39.34 KG/M2 | HEIGHT: 63 IN | OXYGEN SATURATION: 99 %

## 2021-05-13 DIAGNOSIS — H10.9 CONJUNCTIVITIS OF BOTH EYES, UNSPECIFIED CONJUNCTIVITIS TYPE: ICD-10-CM

## 2021-05-13 DIAGNOSIS — R05.9 COUGH: Primary | ICD-10-CM

## 2021-05-13 DIAGNOSIS — R06.2 WHEEZING: ICD-10-CM

## 2021-05-13 DIAGNOSIS — J30.1 ALLERGIC RHINITIS DUE TO POLLEN, UNSPECIFIED SEASONALITY: ICD-10-CM

## 2021-05-13 PROCEDURE — 99244 OFF/OP CNSLTJ NEW/EST MOD 40: CPT | Mod: 25 | Performed by: ALLERGY & IMMUNOLOGY

## 2021-05-13 PROCEDURE — 95004 PERQ TESTS W/ALRGNC XTRCS: CPT | Performed by: ALLERGY & IMMUNOLOGY

## 2021-05-13 RX ORDER — SOFT LENS ADJUNCTIVE SOLUTIONS
1-2 DROPS OPHTHALMIC (EYE) 4 TIMES DAILY PRN
COMMUNITY
End: 2024-06-12

## 2021-05-13 RX ORDER — OLOPATADINE HYDROCHLORIDE 1 MG/ML
1 SOLUTION/ DROPS OPHTHALMIC 2 TIMES DAILY
Qty: 5 ML | Refills: 11 | Status: SHIPPED | OUTPATIENT
Start: 2021-05-13 | End: 2023-03-23

## 2021-05-13 ASSESSMENT — MIFFLIN-ST. JEOR: SCORE: 1616.12

## 2021-05-13 NOTE — PROGRESS NOTES
Berhane Murray is a 45 year old Black or  female with previous medical history significant for asthma. Berhane Murray is being seen today for evaluation of asthma and seasonal allergies. The patient is being seen in consultation at the request of Shabana Toribio MD.     Patient reports that over the course of the last 2 years she has had a cough.  Cough has been associated with expiratory wheezing.  Denies short of breath or tightness in her chest.  Cough is made worse with speaking and laughter.  She frequently will wheeze at night.  Cough is present throughout the day.  She has used albuterol and has not found it to be terribly beneficial for the cough.  Claritin has been helpful for the cough.  Not helpful for wheezing.  She denies rhinorrhea, postnasal drainage, nasal congestion.  She does endorse ocular watering.  This is year-round.  Worse when outside.  Treated with Opcon-A and beneficial.  No history of allergy testing.  Chest x-ray has been obtained and normal.  No history of pulmonary function studies.  She does have an upcoming appointment with pulmonary.    History of asthma in childhood. No clear triggers otherwise for chest symptoms. Cough is dry.     ENVIRONMENTAL HISTORY: [place] The home is heated with a forced air and gas furnace. They do have central air conditioning. The patient's bedroom is furnished with carpeting in bedroom.  Pets inside the house include 0 pets. There is no history of cockroach or mice infestation. There is/are 0 smokers in the house.  The house does not have a damp basement.     Past Medical History:   Diagnosis Date     Cervical high risk HPV (human papillomavirus) test positive 08/04/2017 08/04/17, 04/28/21     Family History   Problem Relation Age of Onset     Breast Cancer Mother      History reviewed. No pertinent surgical history.    REVIEW OF SYSTEMS:  General: negative for weight gain. negative for weight loss.  negative for changes in sleep.   Ears: negative for fullness. negative for hearing loss. negative for dizziness.   Nose: negative for snoring.negative for changes in smell. negative for drainage.   Eyes: positive  for eye watering. negative for eye itching. negative for vision changes. negative for eye redness.  Throat: negative for hoarseness. negative for sore throat. negative for trouble swallowing.   Lungs: negative for shortness of breath.positive  for wheezing. positive  for sputum production.   Cardiovascular: negative for chest pain. negative for swelling of ankles. negative for fast or irregular heartbeat.   Gastrointestinal: negative for nausea. negative for heartburn. negative for acid reflux.   Musculoskeletal: negative for joint pain. negative for joint stiffness. negative for joint swelling.   Neurologic: negative for seizures. negative for fainting. negative for weakness.   Psychiatric: negative for changes in mood. negative for anxiety.   Endocrine: negative for cold intolerance. negative for heat intolerance. negative for tremors.   Lymphatic: negative for lower extremity swelling. negative for lymph node swelling.   Hematologic: negative for easy bruising. negative for easy bleeding.  Integumentary: negative for rash. negative for scaling. negative for nail changes.       Current Outpatient Medications:      albuterol (PROAIR HFA/PROVENTIL HFA/VENTOLIN HFA) 108 (90 Base) MCG/ACT inhaler, Inhale 2 puffs into the lungs every 6 hours, Disp: 1 Inhaler, Rfl: 0     alcohol swab prep pads, Use to swab area of injection/jacqueline as directed., Disp: 100 each, Rfl: 3     blood glucose (NO BRAND SPECIFIED) test strip, Use to test blood sugar 1 times daily or as directed. To accompany: Blood Glucose Monitor Brands: per insurance., Disp: 100 strip, Rfl: 6     blood glucose calibration (NO BRAND SPECIFIED) solution, To accompany: Blood Glucose Monitor Brands: per insurance., Disp: 1 each, Rfl: 1     blood glucose  monitoring (NO BRAND SPECIFIED) meter device kit, Use to test blood sugar 1 times daily or as directed. Preferred blood glucose meter OR supplies to accompany: Blood Glucose Monitor Brands: per insurance., Disp: 1 kit, Rfl: 0     fluticasone (ARNUITY ELLIPTA) 200 MCG/ACT inhaler, Inhale 1 puff into the lungs daily, Disp: 1 each, Rfl: 4     naphazoline-pheniramine (OPCON-A) SOLN ophthalmic solution, 1-2 drops 4 times daily as needed for irritation, Disp: , Rfl:      olopatadine (PATANOL) 0.1 % ophthalmic solution, Place 1 drop into both eyes 2 times daily, Disp: 5 mL, Rfl: 11     thin (NO BRAND SPECIFIED) lancets, Use with lanceting device. To accompany: Blood Glucose Monitor Brands: per insurance., Disp: 100 each, Rfl: 6  Immunization History   Administered Date(s) Administered     Influenza (IIV3) PF 11/03/2006     TD (ADULT, 7+) 07/22/1999     TDAP Vaccine (Adacel) 01/20/2016     Tdap (Adacel,Boostrix) 11/03/2006     Allergies   Allergen Reactions     Prednisone      Fainted after competing full dose.          EXAM:   Constitutional:  Appears well-developed and well-nourished. No distress.   HEENT:   Head: Normocephalic.   Nasal tissue pink and normal appearing.  No rhinorrhea noted.    Eyes: Conjunctivae are non-erythematous   No maxillary or frontal sinus tenderness to palpation.   Cardiovascular: Normal rate, regular rhythm and normal heart sounds. Exam reveals no gallop and no friction rub.   No murmur heard.  Respiratory: Effort normal and breath sounds normal. No respiratory distress. No wheezes. No rales.   Musculoskeletal: Normal range of motion.   Neuro: Oriented to person, place, and time.  Skin: Skin is warm and dry. No rash noted.   Psychiatric: Normal mood and affect.     Nursing note and vitals reviewed.      WORKUP:  ENVIRONMENTAL PERCUTANEOUS SKIN TESTING: ADULT  [facility] 5/13/2021   Consent Y   Ordering Physician Russ   Interpreting Physician Russ   Testing Technician Violette    Location Back   Time start:  9:35 AM   Time End:  9:50 AM   Positive Control: Histatrol*ALK 1 mg/ml 4/20   Negative Control: 50% Glycerin 0   Cat Hair*ALK (10,000 BAU/ml) 0   AP Dog Hair/Dander (1:100 w/v) 0   Dust Mite p. 30,000 AU/ml 0   Dust Mite f. (30,000 AU/ml) 0   Spenser (W/F in millimeters) 0   Eric Grass (100,000 BAU/mL) 0   Red Cedar (W/F in millimeters) 0   Maple/[place] (W/F in millimeters) 5/25   Hackberry (W/F in millimeters) 4/25   [place] (W/F in millimeters) 0   [place] *ALK (W/F in millimeters) 0   American Elm (W/F in millimeters) 4/22   [place] (W/F in millimeters) 4/22   Black [place] (W/F in millimeters) 0   Birch Mix (W/F in millimeters) 0   [place] (W/F in millimeters) 0   Oak (W/F in millimeters) 0   Cocklebur (W/F in millimeters) 4/35   [place] (W/F in millimeters) 4/20   White Andrés (W/F in millimeters) 0   Careless (W/F in millimeters) 0   Nettle (W/F in millimeters) 0   English Plantain (W/F in millimeters) 0   Kochia (W/F in millimeters) 0   Lamb's Quarter (W/F in millimeters) 3/20   Marshelder (W/F in millimeters) 4/25   Ragweed Mix* ALK (W/F in millimeters) 10/45   Russian Thistle (W/F in millimeters) 4/30   Sagebrush/Mugwort (W/F in millimeters) 4/32   Sheep Sorrel (W/F in millimeters) 3/25   Feather Mix* ALK (W/F in millimeters) 0   Penicillium Mix (1:10 w/v) 0   Curvularia spicifera (1:10 w/v) 0   Epicoccum (1:10 w/v) 0   Aspergillus fumigatus (1:10 w/v): 0   Alternaria tenius (1:10 w/v) 0   H. Cladosporium (1:10 w/v) 0   Phoma herbarum (1:10 w/v) 0        ASSESSMENT/PLAN:  Problem List Items Addressed This Visit        Respiratory    Allergic rhinitis due to pollen, unspecified seasonality    Relevant Medications    fluticasone (ARNUITY ELLIPTA) 200 MCG/ACT inhaler    olopatadine (PATANOL) 0.1 % ophthalmic solution    Other Relevant Orders    ALLERGY SKIN TESTS,ALLERGENS [06066] (Completed)    Wheezing    Relevant Medications    fluticasone (ARNUITY ELLIPTA) 200 MCG/ACT  inhaler    Other Relevant Orders    Pulmonary Function Test    Cough - Primary     Daily cough.  Wheezing daily.  Cough not clearly affected by albuterol.  Cough is dry.  Claritin has been helpful for the cough but not for the wheezing.    -Allergy testing as noted.  -Start Arnuity 200 mcg 1 puff inhaled daily.  -Albuterol 2-4 puffs inhaled (use a spacer unless using a Proair Respiclick device) every 4 hours as needed for chest tightness, wheezing, shortness of breath and/or coughing.   -Sending for complete pulmonary function studies.         Relevant Medications    fluticasone (ARNUITY ELLIPTA) 200 MCG/ACT inhaler    Other Relevant Orders    Pulmonary Function Test       Infectious/Inflammatory    Conjunctivitis of both eyes, unspecified conjunctivitis type     Ocular watering when outdoors.  Opcon-A has been beneficial.    Skin testing:  Positive for trees and weeds.    -Allergen avoidance measures discussed and literature provided.  -Patanol (olapatdine) 1 drop/eye twice daily as needed.  -Claritin 10 mg by mouth daily.           Relevant Medications    olopatadine (PATANOL) 0.1 % ophthalmic solution    Other Relevant Orders    ALLERGY SKIN TESTS,ALLERGENS [20880] (Completed)        Return in 4 weeks.     Chart documentation with Dragon Voice recognition Software. Although reviewed after completion, some words and grammatical errors may remain.    DO AFSANEH NortonI  Medical Director for Allergy/Immunology at [place], [place]

## 2021-05-13 NOTE — ASSESSMENT & PLAN NOTE
Ocular watering when outdoors.  Opcon-A has been beneficial.    Skin testing:  Positive for trees and weeds.    -Allergen avoidance measures discussed and literature provided.  -Patanol (olapatdine) 1 drop/eye twice daily as needed.  -Claritin 10 mg by mouth daily.

## 2021-05-13 NOTE — LETTER
5/13/2021         RE: Berhane Murray  [address]  [place] [address]        Dear Colleague,    Thank you for referring your patient, Berhane Murray, to the [facility]. Please see a copy of my visit note below.    Berhane Murray is a 45 year old Black or  female with previous medical history significant for asthma. Berhane Murray is being seen today for evaluation of asthma and seasonal allergies. The patient is being seen in consultation at the request of Shabana Toribio MD.     Patient reports that over the course of the last 2 years she has had a cough.  Cough has been associated with expiratory wheezing.  Denies short of breath or tightness in her chest.  Cough is made worse with speaking and laughter.  She frequently will wheeze at night.  Cough is present throughout the day.  She has used albuterol and has not found it to be terribly beneficial for the cough.  Claritin has been helpful for the cough.  Not helpful for wheezing.  She denies rhinorrhea, postnasal drainage, nasal congestion.  She does endorse ocular watering.  This is year-round.  Worse when outside.  Treated with Opcon-A and beneficial.  No history of allergy testing.  Chest x-ray has been obtained and normal.  No history of pulmonary function studies.  She does have an upcoming appointment with pulmonary.    History of asthma in childhood. No clear triggers otherwise for chest symptoms. Cough is dry.     ENVIRONMENTAL HISTORY: [place] The home is heated with a forced air and gas furnace. They do have central air conditioning. The patient's bedroom is furnished with carpeting in bedroom.  Pets inside the house include 0 pets. There is no history of cockroach or mice infestation. There is/are 0 smokers in the house.  The house does not have a damp basement.     Past Medical History:   Diagnosis Date     Cervical high risk HPV (human papillomavirus) test positive  08/04/2017 08/04/17, 04/28/21     Family History   Problem Relation Age of Onset     Breast Cancer Mother      History reviewed. No pertinent surgical history.    REVIEW OF SYSTEMS:  General: negative for weight gain. negative for weight loss. negative for changes in sleep.   Ears: negative for fullness. negative for hearing loss. negative for dizziness.   Nose: negative for snoring.negative for changes in smell. negative for drainage.   Eyes: positive  for eye watering. negative for eye itching. negative for vision changes. negative for eye redness.  Throat: negative for hoarseness. negative for sore throat. negative for trouble swallowing.   Lungs: negative for shortness of breath.positive  for wheezing. positive  for sputum production.   Cardiovascular: negative for chest pain. negative for swelling of ankles. negative for fast or irregular heartbeat.   Gastrointestinal: negative for nausea. negative for heartburn. negative for acid reflux.   Musculoskeletal: negative for joint pain. negative for joint stiffness. negative for joint swelling.   Neurologic: negative for seizures. negative for fainting. negative for weakness.   Psychiatric: negative for changes in mood. negative for anxiety.   Endocrine: negative for cold intolerance. negative for heat intolerance. negative for tremors.   Lymphatic: negative for lower extremity swelling. negative for lymph node swelling.   Hematologic: negative for easy bruising. negative for easy bleeding.  Integumentary: negative for rash. negative for scaling. negative for nail changes.       Current Outpatient Medications:      albuterol (PROAIR HFA/PROVENTIL HFA/VENTOLIN HFA) 108 (90 Base) MCG/ACT inhaler, Inhale 2 puffs into the lungs every 6 hours, Disp: 1 Inhaler, Rfl: 0     alcohol swab prep pads, Use to swab area of injection/jacqueline as directed., Disp: 100 each, Rfl: 3     blood glucose (NO BRAND SPECIFIED) test strip, Use to test blood sugar 1 times daily or as  directed. To accompany: Blood Glucose Monitor Brands: per insurance., Disp: 100 strip, Rfl: 6     blood glucose calibration (NO BRAND SPECIFIED) solution, To accompany: Blood Glucose Monitor Brands: per insurance., Disp: 1 each, Rfl: 1     blood glucose monitoring (NO BRAND SPECIFIED) meter device kit, Use to test blood sugar 1 times daily or as directed. Preferred blood glucose meter OR supplies to accompany: Blood Glucose Monitor Brands: per insurance., Disp: 1 kit, Rfl: 0     fluticasone (ARNUITY ELLIPTA) 200 MCG/ACT inhaler, Inhale 1 puff into the lungs daily, Disp: 1 each, Rfl: 4     naphazoline-pheniramine (OPCON-A) SOLN ophthalmic solution, 1-2 drops 4 times daily as needed for irritation, Disp: , Rfl:      olopatadine (PATANOL) 0.1 % ophthalmic solution, Place 1 drop into both eyes 2 times daily, Disp: 5 mL, Rfl: 11     thin (NO BRAND SPECIFIED) lancets, Use with lanceting device. To accompany: Blood Glucose Monitor Brands: per insurance., Disp: 100 each, Rfl: 6  Immunization History   Administered Date(s) Administered     Influenza (IIV3) PF 11/03/2006     TD (ADULT, 7+) 07/22/1999     TDAP Vaccine (Adacel) 01/20/2016     Tdap (Adacel,Boostrix) 11/03/2006     Allergies   Allergen Reactions     Prednisone      Fainted after competing full dose.          EXAM:   Constitutional:  Appears well-developed and well-nourished. No distress.   HEENT:   Head: Normocephalic.   Nasal tissue pink and normal appearing.  No rhinorrhea noted.    Eyes: Conjunctivae are non-erythematous   No maxillary or frontal sinus tenderness to palpation.   Cardiovascular: Normal rate, regular rhythm and normal heart sounds. Exam reveals no gallop and no friction rub.   No murmur heard.  Respiratory: Effort normal and breath sounds normal. No respiratory distress. No wheezes. No rales.   Musculoskeletal: Normal range of motion.   Neuro: Oriented to person, place, and time.  Skin: Skin is warm and dry. No rash noted.   Psychiatric: Normal  mood and affect.     Nursing note and vitals reviewed.      WORKUP:  ENVIRONMENTAL PERCUTANEOUS SKIN TESTING: ADULT  [facility] 5/13/2021   Consent Y   Ordering Physician Russ   Interpreting Physician Russ   Testing Technician Violette Cle[employer] Back   Time start:  9:35 AM   Time End:  9:50 AM   Positive Control: Histatrol*ALK 1 mg/ml 4/20   Negative Control: 50% Glycerin 0   Cat Hair*ALK (10,000 BAU/ml) 0   AP Dog Hair/Dander (1:100 w/v) 0   Dust Mite p. 30,000 AU/ml 0   Dust Mite f. (30,000 AU/ml) 0   [place]er (W/F in millimeters) 0   Eric Grass (100,000 BAU/mL) 0   Red Cedar (W/F in millimeters) 0   Maple/[place] (W/F in millimeters) 5/25   Hackberry (W/F in millimeters) 4/25   [place] (W/F in millimeters) 0   [place] *ALK (W/F in millimeters) 0   American Elm (W/F in millimeters) 4/22   [place] (W/F in millimeters) 4/22   Black [place] (W/F in millimeters) 0   Birch Mix (W/F in millimeters) 0   [place] (W/F in millimeters) 0   Oak (W/F in millimeters) 0   [place]le[place] (W/F in millimeters) 4/35   [place] (W/F in millimeters) 4/20   White Andrés (W/F in millimeters) 0   Careless (W/F in millimeters) 0   Nettle (W/F in millimeters) 0   English Plantain (W/F in millimeters) 0   Kochia (W/F in millimeters) 0   Lamb's Quarter (W/F in millimeters) 3/20   Marshelder (W/F in millimeters) 4/25   Ragweed Mix* ALK (W/F in millimeters) 10/45   Russian Thistle (W/F in millimeters) 4/30   Sagebrush/Mugwort (W/F in millimeters) 4/32   Sheep Sorrel (W/F in millimeters) 3/25   Feather Mix* ALK (W/F in millimeters) 0   Penicillium Mix (1:10 w/v) 0   Curvularia spicifera (1:10 w/v) 0   Epicoccum (1:10 w/v) 0   Aspergillus fumigatus (1:10 w/v): 0   Alternaria tenius (1:10 w/v) 0   H. Cladosporium (1:10 w/v) 0   Phoma herbarum (1:10 w/v) 0        ASSESSMENT/PLAN:  Problem List Items Addressed This Visit        Respiratory    Allergic rhinitis due to pollen, unspecified seasonality    Relevant Medications    fluticasone  (ARNUITY ELLIPTA) 200 MCG/ACT inhaler    olopatadine (PATANOL) 0.1 % ophthalmic solution    Other Relevant Orders    ALLERGY SKIN TESTS,ALLERGENS [18046] (Completed)    Wheezing    Relevant Medications    fluticasone (ARNUITY ELLIPTA) 200 MCG/ACT inhaler    Other Relevant Orders    Pulmonary Function Test    Cough - Primary     Daily cough.  Wheezing daily.  Cough not clearly affected by albuterol.  Cough is dry.  Claritin has been helpful for the cough but not for the wheezing.    -Allergy testing as noted.  -Start Arnuity 200 mcg 1 puff inhaled daily.  -Albuterol 2-4 puffs inhaled (use a spacer unless using a Proair Respiclick device) every 4 hours as needed for chest tightness, wheezing, shortness of breath and/or coughing.   -Sending for complete pulmonary function studies.         Relevant Medications    fluticasone (ARNUITY ELLIPTA) 200 MCG/ACT inhaler    Other Relevant Orders    Pulmonary Function Test       Infectious/Inflammatory    Conjunctivitis of both eyes, unspecified conjunctivitis type     Ocular watering when outdoors.  Opcon-A has been beneficial.    Skin testing:  Positive for trees and weeds.    -Allergen avoidance measures discussed and literature provided.  -Patanol (olapatdine) 1 drop/eye twice daily as needed.  -Claritin 10 mg by mouth daily.           Relevant Medications    olopatadine (PATANOL) 0.1 % ophthalmic solution    Other Relevant Orders    ALLERGY SKIN TESTS,ALLERGENS [[id number]] (Completed)        Return in 4 weeks.     Chart documentation with Dragon Voice recognition Software. Although reviewed after completion, some words and grammatical errors may remain.    Antony Solano DO FAAAAI  Medical Director for Allergy/Immunology at [place], [place]        Again, thank you for allowing me to participate in the care of your patient.        Sincerely,        Antony Solano DO

## 2021-05-13 NOTE — PATIENT INSTRUCTIONS
Allergy Staff Appt Hours Shot Hours Locations    Physician     Antony Solano DO       Support Staff     LUIZ Larose CMA  Tuesday:        [place] 7-5     Wednesday:        [place]: 7-5     Thursday:                    [place] 7-6     Friday:  [place]  7-2   [place]        Thursday: 8-5:20        Friday: 7-12     [place]        Tuesday: 7- 3:20 Wednesday: 7-4:20     [place] Monday: 7-2:20 Tuesday: 9-5:20         [facility]  [address] [place] [address]  Appt Line: [phone]  Allergy RN:  [phone]    [facility]  [address] [place] [address]  Appt Line: [phone]  Allergy RN:  [phone]       Important Scheduling Information  Aspirin Desensitization: Appt will last 2 clinic days. Please call the Allergy RN line for your clinic to schedule. Discontinue antihistamines 7 days prior to the appointment.     Food Challenges: Appt will last 3-4 hours. Please call the Allergy RN line for your clinic to schedule. Discontinue antihistamines 7 days prior to the appointment.     Penicillin Testing: Appt will last 2-3 hours. Please call the Allergy RN line for your clinic to schedule. Discontinue antihistamines 7 days prior to the appointment.     Skin Testing: Appt will about 40 minutes. Call the appointment line for your clinic to schedule. Discontinue antihistamines 7 days prior to the appointment.     Venom Testing: Appt will last 2-3 hours. Please call the Allergy RN line for your clinic to schedule. Discontinue antihistamines 7 days prior to the appointment.     Thank you for trusting us with your Allergy, Asthma, and Immunology care. Please feel free to contact us with any questions or concerns you may have.      ENVIRONMENTAL PERCUTANEOUS SKIN TESTING: ADULT  [facility] 5/13/2021   Consent Y   Ordering Physician Russ   Interpreting Physician Russ   Testing Technician Violette Cleaning Back   Time start:  9:35 AM   Time End:   9:50 AM   Positive Control: Histatrol*ALK 1 mg/ml 4/20   Negative Control: 50% Glycerin 0   Cat Hair*ALK (10,000 BAU/ml) 0   AP Dog Hair/Dander (1:100 w/v) 0   Dust Mite p. 30,000 AU/ml 0   Dust Mite f. (30,000 AU/ml) 0   [place]enser (W/F in millimeters) 0   Eric Grass (100,000 BAU/mL) 0   Red Cedar (W/F in millimeters) 0   Maple/[place] (W/F in millimeters) 5/25   Hackberry (W/F in millimeters) 4/25   [place] (W/F in millimeters) 0   [place] *ALK (W/F in millimeters) 0   American Elm (W/F in millimeters) 4/22   [place] (W/F in millimeters) 4/22   Black [place] (W/F in millimeters) 0   Birch Mix (W/F in millimeters) 0   [place] (W/F in millimeters) 0   Oak (W/F in millimeters) 0   [place] (W/F in millimeters) 4/35   [place] (W/F in millimeters) 4/20   White Andrés (W/F in millimeters) 0   Careless (W/F in millimeters) 0   Nettle (W/F in millimeters) 0   English Plantain (W/F in millimeters) 0   Kochia (W/F in millimeters) 0   Lamb's Quarter (W/F in millimeters) 3/20   Marshelder (W/F in millimeters) 4/25   Ragweed Mix* ALK (W/F in millimeters) 10/45   Russian Thistle (W/F in millimeters) 4/30   Sagebrush/Mugwort (W/F in millimeters) 4/32   Sheep Sorrel (W/F in millimeters) 3/25   Feather Mix* ALK (W/F in millimeters) 0   Penicillium Mix (1:10 w/v) 0   Curvularia spicifera (1:10 w/v) 0   Epicoccum (1:10 w/v) 0   Aspergillus fumigatus (1:10 w/v): 0   Alternaria tenius (1:10 w/v) 0   H. Cladosporium (1:10 w/v) 0   Phoma herbarum (1:10 w/v) 0      - Arnuity 200mcg 1 puff inhaled daily.  - Albuterol 2-4 puffs inhaled (use a spacer unless using a Proair Respiclick device) every 4 hours as needed for chest tightness, wheezing, shortness of breath and/or coughing.   - Claritin 10mg daily.   - Patanol (olapatdine) 1 drop/eye twice daily as needed.     AEROALLERGEN AVOIDANCE INSTRUCTIONS  POLLEN  Pollens are the tiny airborne particles given off by trees, weeds, and grasses. They can be the cause of seasonal allergic  rhinitis or hay fever symptoms, which include stuffy, itchy, runny nose, redness, swelling and itching of the eyes, and itching of the ears and throat. Here are some tips on how to avoid pollen exposure.  1. .Keep windows closed and use the air conditioner when possible.  2.  Avoid outside exposure in the early morning as pollen counts are highest at that time.  3.  Take a shower and wash hair each night.  4.  Consider wearing a mask when working in the yard and/or garden.  5.  Clean furnace filter monthly with HEPA filters. Consider a HEPA filter vacuum  which will prevent pollen from being reintroduced into the air.

## 2021-05-13 NOTE — ASSESSMENT & PLAN NOTE
Daily cough.  Wheezing daily.  Cough not clearly affected by albuterol.  Cough is dry.  Claritin has been helpful for the cough but not for the wheezing.    -Allergy testing as noted.  -Start Arnuity 200 mcg 1 puff inhaled daily.  -Albuterol 2-4 puffs inhaled (use a spacer unless using a Proair Respiclick device) every 4 hours as needed for chest tightness, wheezing, shortness of breath and/or coughing.   -Sending for complete pulmonary function studies.

## 2021-05-25 ENCOUNTER — OFFICE VISIT (OUTPATIENT)
Dept: PULMONOLOGY | Facility: CLINIC | Age: 46
End: 2021-05-25
Attending: INTERNAL MEDICINE
Payer: COMMERCIAL

## 2021-05-25 ENCOUNTER — PRE VISIT (OUTPATIENT)
Dept: PULMONOLOGY | Facility: CLINIC | Age: 46
End: 2021-05-25

## 2021-05-25 VITALS
OXYGEN SATURATION: 99 % | SYSTOLIC BLOOD PRESSURE: 114 MMHG | HEART RATE: 96 BPM | BODY MASS INDEX: 38.64 KG/M2 | WEIGHT: 218.1 LBS | DIASTOLIC BLOOD PRESSURE: 82 MMHG | HEIGHT: 63 IN

## 2021-05-25 DIAGNOSIS — J30.1 ALLERGIC RHINITIS DUE TO POLLEN, UNSPECIFIED SEASONALITY: Primary | ICD-10-CM

## 2021-05-25 DIAGNOSIS — J45.909 EXTRINSIC ASTHMA WITHOUT COMPLICATION, UNSPECIFIED ASTHMA SEVERITY, UNSPECIFIED WHETHER PERSISTENT: ICD-10-CM

## 2021-05-25 PROBLEM — E11.9 DIABETES MELLITUS, TYPE 2 (H): Chronic | Status: ACTIVE | Noted: 2021-04-28

## 2021-05-25 PROBLEM — E66.01 MORBID OBESITY (H): Chronic | Status: ACTIVE | Noted: 2021-04-28

## 2021-05-25 PROCEDURE — G0463 HOSPITAL OUTPT CLINIC VISIT: HCPCS

## 2021-05-25 PROCEDURE — 99204 OFFICE O/P NEW MOD 45 MIN: CPT | Performed by: INTERNAL MEDICINE

## 2021-05-25 RX ORDER — FLUTICASONE PROPIONATE 50 MCG
1 SPRAY, SUSPENSION (ML) NASAL DAILY
Qty: 16 G | Refills: 3 | Status: SHIPPED | OUTPATIENT
Start: 2021-05-25 | End: 2024-06-12

## 2021-05-25 RX ORDER — MONTELUKAST SODIUM 10 MG/1
10 TABLET ORAL EVERY EVENING
Qty: 45 TABLET | Refills: 3 | Status: SHIPPED | OUTPATIENT
Start: 2021-05-25 | End: 2024-06-12

## 2021-05-25 ASSESSMENT — MIFFLIN-ST. JEOR: SCORE: 1598.43

## 2021-05-25 NOTE — LETTER
5/25/2021         RE: Berhane Murray  [address]  [place] [address]        Dear Colleague,    Thank you for referring your patient, Berhane Murray, to the [facility] FOR LUNG SCIENCE AND [facility]. Please see a copy of my visit note below.    [facility]  Pulmonary Medicine  Visit Clinic Note  May 25, 2021         ASSESSMENT & PLAN     44 y/o female presents for chronic non-productive cough over the past two years. Patient previously seen by allergist with noted allergy to pollen exacerbating chronic allergic rhinitis. Additionally, patient awaiting sleep study MIKE symptomology which may be exacerbating cough symptomology. Patient assigned singulair, BREO inhaler, and flonase (patient education video shown). Patient additionally recommended diet and exercise regimen for weight loss. On f/u if patients symptomology not resolved by CPAP therapy, and singulair/BREO/flonase management recommend sinus and lung CT scan with PFTs. Patient amenable to plan.     RTC in 2 months     Florentino Pop D.O., MPH 5/25/2021  Occupational Medicine Resident  Time with patient 33 minutes, case staffed with Dr. Lloyd.     I explained the lab values, imagings and findings to the patient.  Patient expressed understanding I did not recognize any barriers to the understanding of the patient.    The above note was dictated using voice recognition software and may include typographical errors. Please contact the author for any clarifications.    Flaco Lloyd MD   Clinic Number: [phone]         Today's visit note:     Chief Complaint: Berhane Murray is a 45 year old year old female who is being seen for New Patient (chronic cough & wheezing)      HPI:   44 y/o with hx of chronic non-productive cough over the past two years. Patient recently seen by allergist with noted diagnosis of chronic allergic rhinitis with specific allergy to pollen on allergy testing.     Patient notes childhood hx  "of asthma with resolution by age 6. Pateint denies N,V,D,fever and is up to date on Covid vaccination.     Patient additionally notes hx of MIKE symptomology of snoring with witnessed apneic episodes with planned sleep study in the next one month.     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     Patient also has hx of DM2 currently being managed with diet and exercise only with patient conducting frequent glucose checks.            Medications:     Current Outpatient Medications   Medication     albuterol (PROAIR HFA/PROVENTIL HFA/VENTOLIN HFA) 108 (90 Base) MCG/ACT inhaler     alcohol swab prep pads     blood glucose (NO BRAND SPECIFIED) test strip     blood glucose calibration (NO BRAND SPECIFIED) solution     blood glucose monitoring (NO BRAND SPECIFIED) meter device kit     naphazoline-pheniramine (OPCON-A) SOLN ophthalmic solution     olopatadine (PATANOL) 0.1 % ophthalmic solution     thin (NO BRAND SPECIFIED) lancets     fluticasone (ARNUITY ELLIPTA) 200 MCG/ACT inhaler     No current facility-administered medications for this visit.             Review of Systems:       A complete 10 point review of systems was otherwise negative except as noted in the HPI.        PHYSICAL EXAM:  /82   Pulse 96   Ht 1.6 m (5' 3\")   Wt 98.9 kg (218 lb 1.6 oz)   SpO2 99%   BMI 38.63 kg/m       General: Well developed, obese female in no apparent distress  Eyes: Anicteric  Nose: Nasal mucosa with no edema or hyperemia.  No polyps  Ears: Hearing grossly normal  Mouth: Oral mucosa is moist, without any lesions. No oropharyngeal exudate.  Neck: supple, no thyromegaly  Lymphatics: No cervical or supraclavicular nodes  Respiratory: Good air movement. No crackles. No rhonchi. No wheezes  Cardiac: RRR, normal S1, S2. No murmurs. No JVD  Abdomen: Soft, NT/ND  Musculoskeletal: Nonpitting edema bilateral. No clubbing. No cyanosis. No   Skin: No rash on " limited exam  Neuro: Normal mentation. Normal speech.  Psych:Normal affect           Data:   All laboratory and imaging data reviewed.        CXR:XR CHEST 2 VW 4/2/2021 10:35 AM     HISTORY: cough X 2 years, intermittent; Cough     COMPARISON: Chest radiograph dated 6/7/2020                                                                      IMPRESSION: No focal consolidation. No pleural effusion. No  pneumothorax. The cardiac mediastinal silhouette is within normal  limits. The visualized osseous structures are intact.     No results found for this or any previous visit (from the past 168 hour(s)).      Sincerely,    Flaco Lloyd MD

## 2021-05-25 NOTE — PROGRESS NOTES
[facility]  Pulmonary Medicine  Visit Clinic Note  May 25, 2021         ASSESSMENT & PLAN     44 y/o female presents for chronic non-productive cough over the past two years. Patient previously seen by allergist with noted allergy to pollen exacerbating chronic allergic rhinitis. Additionally, patient awaiting sleep study MIKE symptomology which may be exacerbating cough symptomology. Patient assigned singulair, BREO inhaler, and flonase (patient education video shown). Patient additionally recommended diet and exercise regimen for weight loss. On f/u if patients symptomology not resolved by CPAP therapy, and singulair/BREO/flonase management recommend sinus and lung CT scan with PFTs. Patient amenable to plan.     RTC in 2 months     Florentino Pop D.O., MPH 5/25/2021  Occupational Medicine Resident  Time with patient 33 minutes, case staffed with Dr. Lloyd.     I explained the lab values, imagings and findings to the patient.  Patient expressed understanding I did not recognize any barriers to the understanding of the patient.    The above note was dictated using voice recognition software and may include typographical errors. Please contact the author for any clarifications.    Flaco Lloyd MD   Clinic Number: [phone]         Today's visit note:     Chief Complaint: Berhane Murray is a 45 year old year old female who is being seen for New Patient (chronic cough & wheezing)      HPI:   44 y/o with hx of chronic non-productive cough over the past two years. Patient recently seen by allergist with noted diagnosis of chronic allergic rhinitis with specific allergy to pollen on allergy testing.     Patient notes childhood hx of asthma with resolution by age 6. Pateint denies N,V,D,fever and is up to date on Covid vaccination.     Patient additionally notes hx of MIKE symptomology of snoring with witnessed apneic episodes with planned sleep study in the next one month.     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     Patient also has hx of DM2 currently being managed with diet and exercise only with patient conducting frequent glucose checks.            Medications:     Current Outpatient Medications   Medication     albuterol (PROAIR HFA/PROVENTIL HFA/VENTOLIN HFA) 108 (90 Base) MCG/ACT inhaler     alcohol swab prep pads     blood glucose (NO BRAND SPECIFIED) test strip     blood glucose calibration (NO BRAND SPECIFIED) solution     blood glucose monitoring (NO BRAND SPECIFIED) meter device kit     naphazoline-pheniramine (OPCON-A) SOLN ophthalmic solution     olopatadine (PATANOL) 0.1 % ophthalmic solution     thin (NO BRAND SPECIFIED) lancets     fluticasone (ARNUITY ELLIPTA) 200 MCG/ACT inhaler     No current facility-administered medications for this visit.             Review of Systems:       A complete 10 point review of systems was otherwise negative except as noted in the HPI.        PHYSICAL EXAM:  /82   Pulse 96   Ht 1.6 m (5' 3\")   Wt 98.9 kg (218 lb 1.6 oz)   SpO2 99%   BMI 38.63 kg/m       General: Well developed, obese female in no apparent distress  Eyes: Anicteric  Nose: Nasal mucosa with no edema or hyperemia.  No polyps  Ears: Hearing grossly normal  Mouth: Oral mucosa is moist, without any lesions. No oropharyngeal exudate.  Neck: supple, no thyromegaly  Lymphatics: No cervical or supraclavicular nodes  Respiratory: Good air movement. No crackles. No rhonchi. No wheezes  Cardiac: RRR, normal S1, S2. No murmurs. No JVD  Abdomen: Soft, NT/ND  Musculoskeletal: Nonpitting edema bilateral. No clubbing. No cyanosis. No   Skin: No rash on limited exam  Neuro: Normal mentation. Normal speech.  Psych:Normal affect           Data:   All laboratory and imaging data reviewed.        CXR:XR CHEST 2 VW 4/2/2021 10:35 AM     HISTORY: cough X 2 years, intermittent; Cough     COMPARISON: Chest radiograph dated " 6/7/2020                                                                      IMPRESSION: No focal consolidation. No pleural effusion. No  pneumothorax. The cardiac mediastinal silhouette is within normal  limits. The visualized osseous structures are intact.     .    No results found for this or any previous visit (from the past 168 hour(s)).

## 2021-05-25 NOTE — NURSING NOTE
Chief Complaint   Patient presents with     New Patient     chronic cough & wheezing     Vitals were taken and medications were reconciled.     JENNY Soriano

## 2021-05-26 ENCOUNTER — VIRTUAL VISIT (OUTPATIENT)
Dept: SLEEP MEDICINE | Facility: CLINIC | Age: 46
End: 2021-05-26
Attending: NURSE PRACTITIONER
Payer: COMMERCIAL

## 2021-05-26 VITALS — WEIGHT: 218 LBS | BODY MASS INDEX: 38.62 KG/M2 | HEIGHT: 63 IN

## 2021-05-26 DIAGNOSIS — R06.83 SNORING: ICD-10-CM

## 2021-05-26 DIAGNOSIS — E66.09 CLASS 2 OBESITY DUE TO EXCESS CALORIES WITH BODY MASS INDEX (BMI) OF 38.0 TO 38.9 IN ADULT, UNSPECIFIED WHETHER SERIOUS COMORBIDITY PRESENT: ICD-10-CM

## 2021-05-26 DIAGNOSIS — E66.812 CLASS 2 OBESITY DUE TO EXCESS CALORIES WITH BODY MASS INDEX (BMI) OF 38.0 TO 38.9 IN ADULT, UNSPECIFIED WHETHER SERIOUS COMORBIDITY PRESENT: ICD-10-CM

## 2021-05-26 DIAGNOSIS — G47.19 EXCESSIVE DAYTIME SLEEPINESS: Primary | ICD-10-CM

## 2021-05-26 DIAGNOSIS — R06.00 DYSPNEA AND RESPIRATORY ABNORMALITY: ICD-10-CM

## 2021-05-26 DIAGNOSIS — R53.81 MALAISE AND FATIGUE: ICD-10-CM

## 2021-05-26 DIAGNOSIS — Z72.820 LACK OF ADEQUATE SLEEP: ICD-10-CM

## 2021-05-26 DIAGNOSIS — R53.83 MALAISE AND FATIGUE: ICD-10-CM

## 2021-05-26 DIAGNOSIS — R06.89 DYSPNEA AND RESPIRATORY ABNORMALITY: ICD-10-CM

## 2021-05-26 PROCEDURE — 99203 OFFICE O/P NEW LOW 30 MIN: CPT | Mod: 95 | Performed by: PHYSICIAN ASSISTANT

## 2021-05-26 ASSESSMENT — MIFFLIN-ST. JEOR: SCORE: 1597.97

## 2021-05-26 NOTE — PATIENT INSTRUCTIONS
Your BMI is Body mass index is 38.62 kg/m .  Weight management is a personal decision.  If you are interested in exploring weight loss strategies, the following discussion covers the approaches that may be successful. Body mass index (BMI) is one way to tell whether you are at a healthy weight, overweight, or obese. It measures your weight in relation to your height.  A BMI of 18.5 to 24.9 is in the healthy range. A person with a BMI of 25 to 29.9 is considered overweight, and someone with a BMI of 30 or greater is considered obese. More than two-thirds of [place] adults are considered overweight or obese.  Being overweight or obese increases the risk for further weight gain. Excess weight may lead to heart disease and diabetes.  Creating and following plans for healthy eating and physical activity may help you improve your health.  Weight control is part of healthy lifestyle and includes exercise, emotional health, and healthy eating habits. Careful eating habits lifelong are the mainstay of weight control. Though there are significant health benefits from weight loss, long-term weight loss with diet alone may be very difficult to achieve- studies show long-term success with dietary management in less than 10% of people. Attaining a healthy weight may be especially difficult to achieve in those with severe obesity. In some cases, medications, devices and surgical management might be considered.  What can you do?  If you are overweight or obese and are interested in methods for weight loss, you should discuss this with your provider.     Consider reducing daily calorie intake by 500 calories.     Keep a food journal.     Avoiding skipping meals, consider cutting portions instead.    Diet combined with exercise helps maintain muscle while optimizing fat loss. Strength training is particularly important for building and maintaining muscle mass. Exercise helps reduce stress, increase energy, and improves fitness.  Increasing exercise without diet control, however, may not burn enough calories to loose weight.       Start walking three days a week 10-20 minutes at a time    Work towards walking thirty minutes five days a week     Eventually, increase the speed of your walking for 1-2 minutes at time    In addition, we recommend that you review healthy lifestyles and methods for weight loss available through the National Institutes of Health patient information sites:  http://win.niddk.nih.gov/publications/index.htm    And look into health and wellness programs that may be available through your health insurance provider, employer, local community center, or [employer].    Weight management plan: Patient was referred to their PCP to discuss a diet and exercise plan.  MY CONTACT NUMBERS ARE:   DOCTOR : SUZI Casarez  SLEEP CENTER :   CPAP EQUIPMENT :    IF I HAVE SLEEP APNEA.....  WHERE CAN I FIND MORE INFORMATION?    American Academy of Sleep Medicine Patient information on sleep disorders:  http://yoursleep.aasmnet.org    THINGS TO REMEMBER  In most situations, sleep apnea is a lifelong disease that must be managed with daily therapy. Untreated disease, when severe, may result in an increased risk for an array of problems from heart disease to mood changes, car accidents and shorter lifespan.    CPAP -  WHY AND HOW?  Continuous positive airway pressure, or CPAP, is the most effective treatment for obstructive sleep apnea. A decision to use CPAP is a major step forward in the pursuit of a healthier life. The successful use of CPAP will help you breathe easier, sleep better and live healthier. Using CPAP can be a positive experience if you keep these guerrero points in mind:  1. Commitment  CPAP is not a quick fix for your problem. It involves a long-term commitment to improve your sleep and your health.    2. Communication  Stay in close communication with both your sleep doctor and your CPAP supplier. Ask lots of questions and  "seek help when you need it.    3. Consistency  Use CPAP all night, every night and for every nap. You will receive the maximum health benefits from CPAP when you use it every time that you sleep. This will also make it easier for your body to adjust to the treatment.    4. Correction  The first machine and mask that you try may not be the best ones for you. Work with your sleep doctor and your CPAP supplier to make corrections to your equipment selection. Ask about trying a different type of machine or mask if you have ongoing problems. Make sure that your mask is a good fit and learn to use your equipment properly.    5. Challenge  Tell a family member or close friend to ask you each morning if you used your CPAP the previous night. Have someone to challenge you to give it your best effort.    6. Connection   Your adjustment to CPAP will be easier if you are able to connect with others who use the same treatment. Ask your sleep doctor if there is a support group in your area for people who have sleep apnea, or look for one on the Internet.    7. Comfort   Increase your level of comfort by using a saline spray, decongestant or heated humidifier if CPAP irritates your nose, mouth or throat. Use your unit's \"ramp\" setting to slowly get used to the air pressure level. There may be soft pads you can buy that will fit over your mask straps. Look on www.CPAP.com for accessories such as these straps, a pillow contoured for side-sleeping with CPAP, longer hoses, hose covers to reduce condensation, or stands to keep the hose out of your way.                                 8. Cleaning   Clean your mask, tubing and headgear on a regular basis. Put this time in your schedule so that you don't forget to do it. Check and replace the filters for your CPAP unit and humidifier.    9. Completion   Although you are never finished with CPAP therapy, you should reward yourself by celebrating the completion of your first month of " treatment. Expect this first month to be your hardest period of adjustment. It will involve some trial and error as you find the machine, mask and pressure settings that are right for you.    Continuation  After your first month of treatment, continue to make a daily commitment to use your CPAP all night, every night and for every nap.    CPAP-Tips to starting with success:  Begin using your CPAP for short periods of time during the day while you watch TV or read.    Use CPAP every night and for every nap. Using it less often reduces the health benefits and makes it harder for your body to get used to it.    Newer CPAP models are virtually silent; however, if you find the sound of your CPAP machine to be bothersome, place the unit under your bed to dampen the sound.     Make small adjustments to your mask, tubing, straps and headgear until you get the right fit. Tightening the mask may actually worsen the leak.  If it leaves significant marks on your face or irritates the bridge of your nose, it may not be the best mask for you.  Speak with the person who supplied the mask and consider trying other masks.    Use a saline nasal spray to ease mild nasal congestion. Neti-Pot or saline nasal rinses may also help. Nasal gel sprays can help reduce nasal dryness.  Biotene mouthwash can be helpful to protect your teeth if you experience frequent dry mouth.  Dry mouth may be a sign of air escaping out of your mouth or out of the mask in the case of a full face mask.  Speak with your provider if you expect that is the case.     Take a nasal decongestant to relieve more severe nasal or sinus congestion.  Do not use Afrin (oxymetazoline) nasal spray more than 3 days in a row.  Speak with your sleep doctor if your nasal congestion is chronic.    Use a heated humidifier that fits your CPAP model to enhance your breathing comfort. Adjust the heat setting up if you get a dry nose or throat, down if you get condensation in the hose  or mask.  Position the CPAP lower than you so that any condensation in the hose drains back into the machine rather than towards the mask.    Try a system that uses nasal pillows if traditional masks give you problems.    Clean your mask, tubing and headgear once a week. Make sure the equipment dries fully.    Regularly check and replace the filters for your CPAP unit and humidifier.    Work closely with your sleep doctor and your CPAP supplier to make sure that you have the machine, mask and air pressure setting that works best for you.    BESIDES CPAP, WHAT OTHER THERAPIES ARE THERE?    Postioning devices if you only have the problem on your back    Dental devices if your condition is mild    Nasal valves may be effective though experience is limited    Tongue Retaining Device if missing teeth precludes the use of a dental device    Weight loss if you are overweight    Surgery in limited cases where devices are not acceptable or there are problems with structures in the nose and throat  If treated with one of these alternative options, further evaluation is necessary to ensure that the therapy is effective. This may require some form of testing.     Healthy Lifestyle:  Healthy diet, exercise and Limit alcohol: Not only will excessive alcohol increase your weight over time, but it irritates the throat tissues and make them swell, shrinking the airway and causing snoring. Drinking alcohol should be limited and stopped within 3-4 hours before going to bed.   Stop smoking: (Red swollen throat, heat, nicotine), also irritates and swells the airway, among numerous other negative health consequences.    Positioning Device  This example shows a pillow that straps around the waist. It may be appropriate for those whose sleep study shows milder sleep apnea that occurs primarily when lying flat on one's back. Preliminary studies have shown benefit but effectiveness at home should be verified.    Nasal Valves               Nasal valves may not be effective if you have frequent nasal congestion or have difficulty breathing through your nose. They may be an option for mild apnea if other options are not well tolerated. The efficacy of these devices is generally less than CPAP or oral appliances.  Oral Appliance  These are examples of two of many custom-made devices that are more likely to work in mild sleep apnea  Oral appliances are dental mouth pieces that fit very much like a sports mouth guards or removable orthodontic retainers. They are used to treat snoring  And obstructive sleep apnea . The device prevents the airway from collapsing by either holding the tongue or supporting the jaw in a forward position. Since oral appliances are non-invasive and easy to use, they may be considered as an early treatment option. Oral appliance therapy (OAT) involves the customization, selection, fabrication, fitting, adjustments and long-term follow-up care of specially designed oral devices, worn during sleep, which reposition the lower jaw and tongue base forward to maintain an open airway.  Custom made oral appliances are proven to be more effective than over-the-counter devices. Therefore, the over-the-counter devices are recommended not to be used as a screening tool nor as a therapeutic option.  Who gets a dental device?  Oral appliance therapy can be used as an alternative to  CPAP therapy for the treatment of mild to moderate sleep apnea and for those patients who prefer OAT to CPAP. Oral appliance therapy is a first line therapy for the treatment of primary snoring. Additionally, OAT is an option for those that cannot tolerate CPAP as therapy or who have experienced insufficient surgical results.    Possible side effects?  Frequent but minor side effects include: excessive salivation, dry mouth, discomfort of teeth and jaw and temporary changes in the patient s bite.  Potential complications include: jaw pain, permanent occlusal  changes and TMJ symptoms.  The above mentioned side effects and complications can be recognized and managed by dentists trained in dental sleep medicine.    Finding a dentist that practices dental sleep medicine  Specific training is available through the American Academy of Dental Sleep Medicine for dentists interested in working in the field of sleep. To find a dentist who is educated in the field of sleep and the use of oral appliances, near you, visit the Web site of the American Academy of Dental Sleep Medicine; also see http://www.accpstorage.org/newOrganization/patients/oralAppliances.pdf   To search for a dentist certified in these practices:  Http://aadsm.org/FindADentist.aspx?1  Http://www.accpstorage.org/newOrganization/patients/oralAppliances.pdf    Tongue Retaining Device               Tongue Retaining Devices are devices that generally  suction cup  onto the tongue preventing it from falling back into the back of the throat during sleep.  They may be an option for people missing teeth, but can be uncomfortable. This particular device can be purchased online, but similar devices made by dentists fit more precisely and may be tolerated better. In general, they are rarely effective and often not very well tolerated.    Weight Loss:  Some patients may experience reduction or elimination of sleep apnea with weight loss.  Though there are significant health benefits from weight loss, long-term weight loss is very difficult to achieve- studies show success with dietary management in less that 10% of people.     If you are interested in dietary weight loss, you should review the options discussed at the National Institutes of Health patient information site:     Http:/www.health.nih.gov/topic/WeightLossDieting    Bariatric programs offer counseling in all methods of weight loss:    Http:/www.[facility].org/Specialties/WeightLossSurgeryandMedicalMgmt/htm    Surgery:  There are a number of surgeries that  "have been attempted to treat apnea. In general, surgical options are usually reserved for cases in which there is a physical abnormality contributing to obstruction or other treatment options are ineffective or not tolerated. Most surgical options are either unreliable or quite invasive. One of the more common procedures is:  Uvulopalatopharyngoplasty: In this procedure, the uvula (the finger-like tissue that hangs in the back of the throat), part of the soft palate (the tissue that the uvula is attached to), and sometimes the tonsils or adenoids are removed. The efficacy of this surgery is around 30-50% .  After surgery, complications may include:  Sleepiness and sleep apnea related to post-surgery medication   Swelling, infection and bleeding   A sore throat and/or difficulty swallowing   Drainage of secretions into the nose and a nasal quality to the voice. English language speech does not seem to be affected by this surgery.   Narrowing of the airway in the nose and throat (hence constricting breathing) snoring and even iatrogenically caused sleep apnea. By cutting the tissues, excess scar tissue can \"tighten\" the airway and make it even smaller than it was before UPPP.  Patients who have had the uvula removed will become unable to correctly speak [place]e or any other language that has a uvular 'r' phoneme.    Surgeries to help resolve nasal congestion may help reduce the severity of apnea slightly. Nasal congestion does not cause apnea on its own, so these surgeries are usually not performed just for MIKE.  They may be worth considering if the nasal congestion is significantly bothersome independent of apnea.    "

## 2021-05-26 NOTE — PROGRESS NOTES
Does patient have any form of state insurance? n    Do you have wifi? y  Do you have a smart phone? y  Can you download an alysha on your phone comfortably with out assistance? y  Can you watch a Youtube video? y    Berhane is a 45 year old who is being evaluated via a billable video visit.      How would you like to obtain your AVS? MyChart  If the video visit is dropped, the invitation should be resent by: Text to cell phone: [phone]   Will anyone else be joining your video visit? No      Odalis Sierra, SURINDER on 5/26/2021 at 9:39 AM    Video Start Time: 11:04 AM  Video-Visit Details    Type of service:  Video Visit    Video End Time:11:33 AM    Originating Location (pt. Location): Home    Distant Location (provider location):  [facility] [place]     Platform used for Video Visit: [employer]       Sleep Consultation:    Date on this visit: 5/26/2021    Berhane Murray is sent by Estefanía Gregory for a sleep consultation regarding snoring.    Primary Physician: Shabana Toribio     Chief Complaint   Patient presents with     Consult     Snoring and persistant cough       Berhane Murray is a 45 year old female with medical history remarkable for chronic cough and obesity. She states snoring and daytime sleepiness has gotten worse since gaining 30-40 lbs in the last 2 years.    For the coughing, she is undergoing pulmonary work up. PFTs are pending. She was recently prescribed BREO, Singulair and Flonase, but has not yet started.      Berhane goes to sleep at 11:00 PM during the week. She wakes up at 7:00 AM without an alarm. She falls asleep in 15 minutes.  Berhane denies difficulty falling asleep.  She wakes up 2 times a night for 5 minutes before falling back to sleep.  Berhane wakes up to coughing and snoring. For the coughing   On weekends, Berhane goes to sleep at 12:00 AM.  She wakes up at 8:00 AM without an alarm. She falls asleep in 15 minutes.  Patient gets an average of 7 hours of sleep per night.  She does not feel refreshed.     Patient does watch TV in bed.     Berhane does not do shift work. She is a .     Berhane does snore every night and snoring is very loud. Patient does have a regular bed partner. There is report of snoring and gasping.  She does not have witnessed apneas. They never sleep separately.  Patient sleeps on her back and side. She denies no morning headaches, morning confusion and restless legs. Berhane denies any sleep walking, sleep talking, dream enactment, sleep paralysis, cataplexy and hypnogogic/hypnopompic hallucinations. She has bruxism.    Berhane denies difficulty breathing through her nose, claustrophobia and reflux at night.      Berhane has gained 30-40 pounds in the last 2 years.  Patient describes themself as a morning person.  Patient's [place] Sleepiness score 16/24 consistent with excessive  daytime sleepiness.      Berhane does not nap.  She takes some inadvertant naps.  She denies falling asleep while driving.  Patient was counseled on the importance of driving while alert, to pull over if drowsy, or nap before getting into the vehicle if sleepy.  She uses occasional caffeine.    Allergies:    Allergies   Allergen Reactions     Prednisone      Fainted after competing full dose.        Medications:    Current Outpatient Medications   Medication Sig Dispense Refill     albuterol (PROAIR HFA/PROVENTIL HFA/VENTOLIN HFA) 108 (90 Base) MCG/ACT inhaler Inhale 2 puffs into the lungs every 6 hours 1 Inhaler 0     alcohol swab prep pads Use to swab area of injection/jacqueline as directed. 100 each 3     blood glucose (NO BRAND SPECIFIED) test strip Use to test blood sugar 1 times daily or as directed. To accompany: Blood Glucose Monitor Brands: per insurance. 100 strip 6     blood glucose calibration (NO BRAND SPECIFIED) solution To accompany: Blood Glucose Monitor Brands: per insurance. 1 each 1     blood glucose monitoring (NO BRAND SPECIFIED) meter device kit Use to test blood  sugar 1 times daily or as directed. Preferred blood glucose meter OR supplies to accompany: Blood Glucose Monitor Brands: per insurance. 1 kit 0     fluticasone (ARNUITY ELLIPTA) 200 MCG/ACT inhaler Inhale 1 puff into the lungs daily 1 each 4     fluticasone (FLONASE) 50 MCG/ACT nasal spray Spray 1 spray into both nostrils daily 16 g 3     fluticasone-vilanterol (BREO ELLIPTA) 200-25 MCG/INH inhaler Inhale 1 puff into the lungs daily 1 each 3     montelukast (SINGULAIR) 10 MG tablet Take 1 tablet (10 mg) by mouth every evening 45 tablet 3     naphazoline-pheniramine (OPCON-A) SOLN ophthalmic solution 1-2 drops 4 times daily as needed for irritation       olopatadine (PATANOL) 0.1 % ophthalmic solution Place 1 drop into both eyes 2 times daily 5 mL 11     thin (NO BRAND SPECIFIED) lancets Use with lanceting device. To accompany: Blood Glucose Monitor Brands: per insurance. 100 each 6       Problem List:  Patient Active Problem List    Diagnosis Date Noted     Cough 05/13/2021     Priority: Medium     Conjunctivitis of both eyes, unspecified conjunctivitis type 05/13/2021     Priority: Medium     Diabetes mellitus, type 2 (H) 04/28/2021     Priority: Medium     Morbid obesity (H) 04/28/2021     Priority: Medium     Allergic rhinitis due to pollen, unspecified seasonality 04/28/2021     Priority: Medium     Wheezing 04/28/2021     Priority: Medium     Cervical high risk HPV (human papillomavirus) test positive 08/04/2017     Priority: Medium     08/04/17 NIL Pap, + HR HPV (not 16 or 18). Plan cotest in 1 year.   12/21/18 NIL Pap, Neg HR HPV. Plan: cotest in 3 years  04/28/21 NIL Pap, + HR HPV (not 16 or 18) Plan cotest in 1 year due 04/28/22.  Results and recommendations released to the pt through josiast, pt viewed.         CARDIOVASCULAR SCREENING; LDL GOAL LESS THAN 160 03/28/2014     Priority: Medium        Past Medical/Surgical History:  Past Medical History:   Diagnosis Date     Cervical high risk HPV (human  papillomavirus) test positive 08/04/2017 08/04/17, 04/28/21     No past surgical history on file.    Social History:  Social History     Socioeconomic History     Marital status:      Spouse name: Not on file     Number of children: Not on file     Years of education: Not on file     Highest education level: Not on file   Occupational History     Not on file   Social Needs     Financial resource strain: Not on file     Food insecurity     Worry: Not on file     Inability: Not on file     Transportation needs     Medical: Not on file     Non-medical: Not on file   Tobacco Use     Smoking status: Never Smoker     Smokeless tobacco: Never Used   Substance and Sexual Activity     Alcohol use: Yes     Drug use: No     Sexual activity: Yes     Partners: Male   Lifestyle     Physical activity     Days per week: Not on file     Minutes per session: Not on file     Stress: Not on file   Relationships     Social connections     Talks on phone: Not on file     Gets together: Not on file     Attends [employer] service: Not on file     Active member of club or organization: Not on file     Attends meetings of clubs or organizations: Not on file     Relationship status: Not on file     Intimate partner violence     Fear of current or ex partner: Not on file     Emotionally abused: Not on file     Physically abused: Not on file     Forced sexual activity: Not on file   Other Topics Concern     Parent/sibling w/ CABG, MI or angioplasty before 65F 55M? Not Asked   Social History Narrative     Not on file       Family History:  Family History   Problem Relation Age of Onset     Breast Cancer Mother        Review of Systems:  A complete review of systems reviewed by me is negative with the exeption of what has been mentioned in the history of present illness.  CONSTITUTIONAL:  POSITIVE for  weight gain  EYES: NEGATIVE for changes in vision, blind spots, double vision.  ENT: NEGATIVE for ear pain, sore throat, sinus pain,  "post-nasal drip, runny nose, bloody nose  CARDIAC: NEGATIVE for fast heartbeats or fluttering in chest, chest pain or pressure, breathlessness when lying flat, swollen legs or swollen feet.  NEUROLOGIC: NEGATIVE headaches, weakness or numbness in the arms or legs.  DERMATOLOGIC: NEGATIVE for rashes, new moles or change in mole(s)  PULMONARY:  POSITIVE for  dry cough and wheezing  and NEGATIVE for  SOB at rest, SOB with activity and coughing up blood  GASTROINTESTINAL: NEGATIVE for nausea or vomitting, loose or watery stools, fat or grease in stools, constipation, abdominal pain, bowel movements black in color or blood noted.  GENITOURINARY: NEGATIVE for pain during urination, blood in urine, urinating more frequently than usual, irregular menstrual periods.  MUSCULOSKELETAL: NEGATIVE for muscle pain, bone or joint pain, swollen joints.  ENDOCRINE: NEGATIVE for increased thirst or urination, diabetes.  LYMPHATIC: NEGATIVE for swollen lymph nodes, lumps or bumps in the breasts or nipple discharge.    Physical Examination:  Vitals: Ht 1.6 m (5' 3\")   Wt 98.9 kg (218 lb)   BMI 38.62 kg/m    BMI= Body mass index is 38.62 kg/m .         [place] Total Score 5/26/2021   Total score - Maljamar 16       MIAN Total Score: 13 (05/26/21 0900)    GENERAL APPEARANCE: alert and no distress  EYES: Eyes grossly normal to inspection  RESP: breathing is non-labored  NEURO: mentation intact and speech normal  PSYCH: affect normal/bright    Last Comprehensive Metabolic Panel:  Sodium   Date Value Ref Range Status   02/20/2014 139 133 - 144 mmol/L Final     Potassium   Date Value Ref Range Status   02/20/2014 3.6 3.4 - 5.3 mmol/L Final     Chloride   Date Value Ref Range Status   02/20/2014 100 94 - 109 mmol/L Final     Carbon Dioxide   Date Value Ref Range Status   02/20/2014 26 20 - 32 mmol/L Final     Anion Gap   Date Value Ref Range Status   02/20/2014 13 6 - 17 mmol/L Final     Glucose   Date Value Ref Range Status   04/02/2021 102 " (H) 70 - 99 mg/dL Final     Urea Nitrogen   Date Value Ref Range Status   02/20/2014 9 5 - 24 mg/dL Final     Creatinine   Date Value Ref Range Status   02/20/2014 0.64 0.52 - 1.04 mg/dL Final     GFR Estimate   Date Value Ref Range Status   02/20/2014 >90 >60 mL/min/1.7m2 Final     Calcium   Date Value Ref Range Status   02/20/2014 9.1 8.5 - 10.4 mg/dL Final     TSH   Date Value Ref Range Status   04/28/2021 0.64 0.40 - 4.00 mU/L Final       Impression:  Patient has features and risk factors for possible obstructive sleep apnea including: BMI of loud snoring, gasping, non-refreshing sleep, bruxism, daytime sleepiness (ESS 16), difficulty maintaining sleep. The STOP-BANG score is 3/8.  The pathophysiology, diagnosis and treatment of MIKE was discussed. The pros and cons of PSG versus HST also reviewed.  Plan:    1. Schedule a Home Sleep Apnea Testing to evaluate for obstructive sleep apnea.    2. Advised her against drowsy driving.    3. Recommend weight management.     Literature provided regarding sleep apnea.      She will follow up with me in approximately two weeks after her sleep study has been competed to review the results and discuss plan of care.       Home Sleep Apnea Testing  reviewed.  Obstructive sleep apnea reviewed.  Complications of untreated sleep apnea were reviewed.    Almaz Azevedo PA-C    CC: Estefanía Gregory

## 2021-05-29 ENCOUNTER — HEALTH MAINTENANCE LETTER (OUTPATIENT)
Age: 46
End: 2021-05-29

## 2021-06-08 ENCOUNTER — VIRTUAL VISIT (OUTPATIENT)
Dept: NUTRITION | Facility: CLINIC | Age: 46
End: 2021-06-08
Payer: COMMERCIAL

## 2021-06-08 DIAGNOSIS — E11.9 TYPE 2 DIABETES MELLITUS (H): Primary | ICD-10-CM

## 2021-06-08 PROCEDURE — G0108 DIAB MANAGE TRN  PER INDIV: HCPCS | Mod: 95

## 2021-06-08 NOTE — PROGRESS NOTES
"VIDEO.Diabetes Follow-up    Subjective/Objective:    Berhane Murray sent in blood glucose log for review. Last date of communication was: 4/30.    Diabetes is being managed with Lifestyle (diet/activity).  She was diagnosed with T2D on 4/02/21 with A1C 6.5%, made changes and A1C was checked again 4/28 @ 6.2%    BG/Food Log:   Has eliminated pop since April, got into this habit with covid.  Is trying \"Intermittent fasting\" from 1 PM -9 PM, trying to not eat late for weight loss    Breakfast if hungry @ 8-juan carlos black coffee with carbajal and a small amount of grits OR toast with carbajal OR a smoothie with greek yogurt, spinach, frozen fruit    1:00 tuna with spinach salad (no carb)  3:00 half a turkey sandwich    Dinner ~ Jr Whopper (without cheese or fries) OR fish sandwich at [employer] OR out to eat steak or seafood    Tries to not eat after 8 or 9 PM    Assessment:  ~ Checking 3x/week range is  (checking 2 hours after eating to see impact of food). These numbers would be in goal pre- or 2 hour post-meal    Piter says today she would like to focus on nutrition info more.     She does says she doesn't want to have to use meds.   ~ I briefly mentioned that Metformin is recommended because of its protective properties    Plan/Response:  See Patient Instructions for co-developed, patient-stated behavior change goals.  Meal Plan Recommendation:   1) Aim for 2-3 carb servings at meals, minimum of 130g/day, focus of carb counting is on portion control and balanced eating  2) Another option is LF vegan meal plan in hopes to achieve remission  3) Intermittent fasting- conflicting research, fine and safe to try as she is doing- it may help with weight and BG,  But there is not just 1 right way or confirmed benefits at this time    Will email info on LF vegan per her request: (graphic and recipes for success)  Beau Dumas,   So nice to visit with you today! You're off to a great start ??     I'll attach a little info here about the " "low-fat vegan meal plan for you to take a look at.   Let me know if you have any questions! These resources come from curriculum developed at [employer] They have lots of info including recipes under the \"good nutrition\" tab and on the site.     It would be great to reconnect with you in about a month to see how you are doing, answer any questions, etc. You can call to schedule, #[phone].    Gregoria Solares RD, LD, [facility]  Time spent 30 minutes    Any diabetes medication dose changes were made via the CDE Protocol and Collaborative Practice Agreement with the patient's referring provider.  "

## 2021-06-16 DIAGNOSIS — J45.909 EXTRINSIC ASTHMA WITHOUT COMPLICATION, UNSPECIFIED ASTHMA SEVERITY, UNSPECIFIED WHETHER PERSISTENT: ICD-10-CM

## 2021-06-16 PROCEDURE — 94060 EVALUATION OF WHEEZING: CPT | Performed by: INTERNAL MEDICINE

## 2021-06-16 PROCEDURE — 94726 PLETHYSMOGRAPHY LUNG VOLUMES: CPT | Performed by: INTERNAL MEDICINE

## 2021-06-16 PROCEDURE — 94729 DIFFUSING CAPACITY: CPT | Performed by: INTERNAL MEDICINE

## 2021-06-18 LAB
DLCOUNC-%PRED-PRE: 77 %
DLCOUNC-PRE: 16.33 ML/MIN/MMHG
DLCOUNC-PRED: 21.03 ML/MIN/MMHG
ERV-%PRED-PRE: 23 %
ERV-PRE: 0.12 L
ERV-PRED: 0.51 L
EXPTIME-PRE: 6.37 SEC
FEF2575-%PRED-POST: 89 %
FEF2575-%PRED-PRE: 78 %
FEF2575-POST: 2.45 L/SEC
FEF2575-PRE: 2.14 L/SEC
FEF2575-PRED: 2.73 L/SEC
FEFMAX-%PRED-PRE: 59 %
FEFMAX-PRE: 4.01 L/SEC
FEFMAX-PRED: 6.78 L/SEC
FEV1-%PRED-PRE: 70 %
FEV1-PRE: 1.85 L
FEV1FEV6-PRE: 85 %
FEV1FEV6-PRED: 83 %
FEV1FVC-PRE: 85 %
FEV1FVC-PRED: 82 %
FEV1SVC-PRE: 81 %
FEV1SVC-PRED: 75 %
FIFMAX-PRE: 2.88 L/SEC
FRCPLETH-%PRED-PRE: 76 %
FRCPLETH-PRE: 2.04 L
FRCPLETH-PRED: 2.66 L
FVC-%PRED-PRE: 67 %
FVC-PRE: 2.17 L
FVC-PRED: 3.21 L
IC-%PRED-PRE: 72 %
IC-PRE: 2.15 L
IC-PRED: 2.99 L
RVPLETH-%PRED-PRE: 115 %
RVPLETH-PRE: 1.92 L
RVPLETH-PRED: 1.66 L
TLCPLETH-%PRED-PRE: 86 %
TLCPLETH-PRE: 4.19 L
TLCPLETH-PRED: 4.86 L
VA-%PRED-PRE: 65 %
VA-PRE: 3.16 L
VC-%PRED-PRE: 64 %
VC-PRE: 2.27 L
VC-PRED: 3.5 L

## 2021-07-21 ENCOUNTER — NURSE TRIAGE (OUTPATIENT)
Dept: NURSING | Facility: CLINIC | Age: 46
End: 2021-07-21
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
## 2021-09-18 ENCOUNTER — HEALTH MAINTENANCE LETTER (OUTPATIENT)
Age: 46
End: 2021-09-18

## 2022-01-08 ENCOUNTER — HEALTH MAINTENANCE LETTER (OUTPATIENT)
Age: 47
End: 2022-01-08

## 2022-04-30 ENCOUNTER — HEALTH MAINTENANCE LETTER (OUTPATIENT)
Age: 47
End: 2022-04-30

## 2022-05-07 ENCOUNTER — OFFICE VISIT (OUTPATIENT)
Dept: URGENT CARE | Facility: URGENT CARE | Age: 47
End: 2022-05-07
Payer: COMMERCIAL

## 2022-05-07 VITALS
HEART RATE: 111 BPM | HEIGHT: 63 IN | OXYGEN SATURATION: 98 % | TEMPERATURE: 97.6 F | BODY MASS INDEX: 38.7 KG/M2 | DIASTOLIC BLOOD PRESSURE: 84 MMHG | SYSTOLIC BLOOD PRESSURE: 144 MMHG | WEIGHT: 218.4 LBS

## 2022-05-07 DIAGNOSIS — Z20.822 SUSPECTED 2019 NOVEL CORONAVIRUS INFECTION: Primary | ICD-10-CM

## 2022-05-07 DIAGNOSIS — H66.003 ACUTE SUPPURATIVE OTITIS MEDIA OF BOTH EARS WITHOUT SPONTANEOUS RUPTURE OF TYMPANIC MEMBRANES, RECURRENCE NOT SPECIFIED: ICD-10-CM

## 2022-05-07 PROCEDURE — U0005 INFEC AGEN DETEC AMPLI PROBE: HCPCS | Performed by: PHYSICIAN ASSISTANT

## 2022-05-07 PROCEDURE — U0003 INFECTIOUS AGENT DETECTION BY NUCLEIC ACID (DNA OR RNA); SEVERE ACUTE RESPIRATORY SYNDROME CORONAVIRUS 2 (SARS-COV-2) (CORONAVIRUS DISEASE [COVID-19]), AMPLIFIED PROBE TECHNIQUE, MAKING USE OF HIGH THROUGHPUT TECHNOLOGIES AS DESCRIBED BY CMS-2020-01-R: HCPCS | Performed by: PHYSICIAN ASSISTANT

## 2022-05-07 PROCEDURE — 99213 OFFICE O/P EST LOW 20 MIN: CPT | Performed by: PHYSICIAN ASSISTANT

## 2022-05-07 ASSESSMENT — ENCOUNTER SYMPTOMS
FEVER: 0
EYES NEGATIVE: 1
PALPITATIONS: 0
ENDOCRINE NEGATIVE: 1
SHORTNESS OF BREATH: 0
HEADACHES: 0
MYALGIAS: 0
VOMITING: 0
RESPIRATORY NEGATIVE: 1
WOUND: 0
CARDIOVASCULAR NEGATIVE: 1
ARTHRALGIAS: 0
LIGHT-HEADEDNESS: 0
DIARRHEA: 0
NECK PAIN: 1
RHINORRHEA: 0
BACK PAIN: 0
DIZZINESS: 0
COUGH: 0
ALLERGIC/IMMUNOLOGIC NEGATIVE: 1
NECK STIFFNESS: 0
WEAKNESS: 0
JOINT SWELLING: 0
NAUSEA: 0
CHILLS: 0
SORE THROAT: 0

## 2022-05-07 NOTE — PROGRESS NOTES
"Chief Complaint:    Chief Complaint   Patient presents with     Neck Pain     Severe neck pain that's radiating to both ears. Started yesterday and got worst over night. Pt took some acetaminophen but it's not helping.     Medical Decision Making:    Vital signs reviewed by Magna Hoyt PA-C  BP (!) 144/84 (BP Location: Left arm, Patient Position: Sitting, Cuff Size: Adult Large)   Pulse 111   Temp 97.6  F (36.4  C) (Tympanic)   Ht 1.6 m (5' 3\")   Wt 99.1 kg (218 lb 6.4 oz)   SpO2 98%   BMI 38.69 kg/m      Differential Diagnosis:  URI Adult/Peds:  Acute right otitis media, Acute left otitis media, Viral syndrome and Viral upper respiratory illness      ASSESSMENT:     1. Suspected 2019 novel coronavirus infection    2. Acute suppurative otitis media of both ears without spontaneous rupture of tympanic membranes, recurrence not specified           PLAN:     Rx for Augmentin for bilateral ear infection.  Ibuprofen and or Tylenol for pain.  Patient instructed to follow up with PCP in 1 week if symptoms are not improving.  Sooner if symptoms worsen.  Worrisome symptoms discussed with instructions to go to the ED.  Patient verbalized understanding and agreed with this plan.    Labs:     No results found for any visits on 05/07/22.    Current Meds:    Current Outpatient Medications:      albuterol (PROAIR HFA/PROVENTIL HFA/VENTOLIN HFA) 108 (90 Base) MCG/ACT inhaler, Inhale 2 puffs into the lungs every 6 hours, Disp: 1 Inhaler, Rfl: 0     alcohol swab prep pads, Use to swab area of injection/jacqueline as directed., Disp: 100 each, Rfl: 3     amoxicillin-clavulanate (AUGMENTIN) 875-125 MG tablet, Take 1 tablet by mouth 2 times daily for 10 days, Disp: 20 tablet, Rfl: 0     blood glucose (NO BRAND SPECIFIED) test strip, Use to test blood sugar 1 times daily or as directed. To accompany: Blood Glucose Monitor Brands: per insurance., Disp: 100 strip, Rfl: 6     blood glucose calibration (NO BRAND SPECIFIED) solution, To " accompany: Blood Glucose Monitor Brands: per insurance., Disp: 1 each, Rfl: 1     blood glucose monitoring (NO BRAND SPECIFIED) meter device kit, Use to test blood sugar 1 times daily or as directed. Preferred blood glucose meter OR supplies to accompany: Blood Glucose Monitor Brands: per insurance., Disp: 1 kit, Rfl: 0     fluticasone (ARNUITY ELLIPTA) 200 MCG/ACT inhaler, Inhale 1 puff into the lungs daily, Disp: 1 each, Rfl: 4     fluticasone (FLONASE) 50 MCG/ACT nasal spray, Spray 1 spray into both nostrils daily, Disp: 16 g, Rfl: 3     fluticasone-vilanterol (BREO ELLIPTA) 200-25 MCG/INH inhaler, Inhale 1 puff into the lungs daily, Disp: 1 each, Rfl: 3     montelukast (SINGULAIR) 10 MG tablet, Take 1 tablet (10 mg) by mouth every evening, Disp: 45 tablet, Rfl: 3     naphazoline-pheniramine (OPCON-A) SOLN ophthalmic solution, 1-2 drops 4 times daily as needed for irritation, Disp: , Rfl:      olopatadine (PATANOL) 0.1 % ophthalmic solution, Place 1 drop into both eyes 2 times daily, Disp: 5 mL, Rfl: 11     thin (NO BRAND SPECIFIED) lancets, Use with lanceting device. To accompany: Blood Glucose Monitor Brands: per insurance., Disp: 100 each, Rfl: 6    Allergies:  Allergies   Allergen Reactions     Prednisone      Fainted after competing full dose.        SUBJECTIVE    HPI: Berhane Murray is an 46 year old female who presents for evaluation and treatment of bilateral ear and neck pain.  Symptoms started last night and have been worsening.  She denies any fever, chills, nausea, vomiting, headache, or dizziness.    ROS:      Review of Systems   Constitutional: Negative for chills and fever.   HENT: Positive for ear pain. Negative for congestion, rhinorrhea and sore throat.    Eyes: Negative.    Respiratory: Negative.  Negative for cough and shortness of breath.    Cardiovascular: Negative.  Negative for chest pain and palpitations.   Gastrointestinal: Negative for diarrhea, nausea and vomiting.   Endocrine: Negative.     Genitourinary: Negative.    Musculoskeletal: Positive for neck pain. Negative for arthralgias, back pain, joint swelling, myalgias and neck stiffness.   Skin: Negative.  Negative for rash and wound.   Allergic/Immunologic: Negative.  Negative for immunocompromised state.   Neurological: Negative for dizziness, weakness, light-headedness and headaches.        Family History   Family History   Problem Relation Age of Onset     Breast Cancer Mother        Social History  Social History     Socioeconomic History     Marital status:      Spouse name: Not on file     Number of children: Not on file     Years of education: Not on file     Highest education level: Not on file   Occupational History     Not on file   Tobacco Use     Smoking status: Never Smoker     Smokeless tobacco: Never Used   Substance and Sexual Activity     Alcohol use: Yes     Drug use: No     Sexual activity: Yes     Partners: Male   Other Topics Concern     Parent/sibling w/ CABG, MI or angioplasty before 65F 55M? Not Asked   Social History Narrative     Not on file     Social Determinants of Health     Financial Resource Strain: Not on file   Food Insecurity: Not on file   Transportation Needs: Not on file   Physical Activity: Not on file   Stress: Not on file   Social Connections: Not on file   Intimate Partner Violence: Not on file   Housing Stability: Not on file        Surgical History:  No past surgical history on file.     Problem List:  Patient Active Problem List   Diagnosis     CARDIOVASCULAR SCREENING; LDL GOAL LESS THAN 160     Cervical high risk HPV (human papillomavirus) test positive     Diabetes mellitus, type 2 (H)     Morbid obesity (H)     Allergic rhinitis due to pollen, unspecified seasonality     Wheezing     Cough     Conjunctivitis of both eyes, unspecified conjunctivitis type           OBJECTIVE:     Vital signs noted and reviewed by Magan Hoyt PA-C  BP (!) 144/84 (BP Location: Left arm, Patient Position:  "Sitting, Cuff Size: Adult Large)   Pulse 111   Temp 97.6  F (36.4  C) (Tympanic)   Ht 1.6 m (5' 3\")   Wt 99.1 kg (218 lb 6.4 oz)   SpO2 98%   BMI 38.69 kg/m       PEFR:    Physical Exam  Vitals and nursing note reviewed.   Constitutional:       General: She is not in acute distress.     Appearance: She is well-developed. She is not ill-appearing, toxic-appearing or diaphoretic.   HENT:      Head: Normocephalic and atraumatic.      Right Ear: External ear normal. No drainage, swelling or tenderness. Tympanic membrane is erythematous. Tympanic membrane is not perforated, retracted or bulging.      Left Ear: External ear normal. No drainage, swelling or tenderness. Tympanic membrane is erythematous and bulging. Tympanic membrane is not perforated or retracted.      Nose: No mucosal edema, congestion or rhinorrhea.      Right Sinus: No maxillary sinus tenderness or frontal sinus tenderness.      Left Sinus: No maxillary sinus tenderness or frontal sinus tenderness.      Mouth/Throat:      Pharynx: No pharyngeal swelling, oropharyngeal exudate, posterior oropharyngeal erythema or uvula swelling.      Tonsils: No tonsillar abscesses.   Eyes:      Pupils: Pupils are equal, round, and reactive to light.   Neck:      Trachea: Trachea normal.     Cardiovascular:      Rate and Rhythm: Normal rate and regular rhythm.      Heart sounds: Normal heart sounds, S1 normal and S2 normal. No murmur heard.    No friction rub. No gallop.   Pulmonary:      Effort: Pulmonary effort is normal. No respiratory distress.      Breath sounds: Normal breath sounds. No decreased breath sounds, wheezing, rhonchi or rales.   Abdominal:      General: Bowel sounds are normal. There is no distension.      Palpations: Abdomen is soft. Abdomen is not rigid. There is no mass.      Tenderness: There is no abdominal tenderness. There is no guarding or rebound.   Musculoskeletal:      Cervical back: Normal range of motion and neck supple. No edema, " erythema, signs of trauma, rigidity, torticollis or crepitus. Pain with movement and muscular tenderness present. No spinous process tenderness. Normal range of motion.   Lymphadenopathy:      Cervical: No cervical adenopathy.   Skin:     General: Skin is warm and dry.   Neurological:      Mental Status: She is alert and oriented to person, place, and time.      Cranial Nerves: No cranial nerve deficit.      Deep Tendon Reflexes: Reflexes are normal and symmetric.   Psychiatric:         Behavior: Behavior normal. Behavior is cooperative.         Thought Content: Thought content normal.         Judgment: Judgment normal.             Magan Hoyt PA-C  5/7/2022, 1:29 PM

## 2022-05-08 LAB — SARS-COV-2 RNA RESP QL NAA+PROBE: NEGATIVE

## 2022-05-09 ENCOUNTER — THERAPY VISIT (OUTPATIENT)
Dept: PHYSICAL THERAPY | Facility: CLINIC | Age: 47
End: 2022-05-09
Payer: COMMERCIAL

## 2022-05-09 DIAGNOSIS — M54.2 NECK PAIN: ICD-10-CM

## 2022-05-09 PROCEDURE — 97161 PT EVAL LOW COMPLEX 20 MIN: CPT | Mod: GP | Performed by: PHYSICAL THERAPIST

## 2022-05-09 PROCEDURE — 97110 THERAPEUTIC EXERCISES: CPT | Mod: GP | Performed by: PHYSICAL THERAPIST

## 2022-05-09 NOTE — PROGRESS NOTES
Physical Therapy Initial Evaluation  Subjective:  The history is provided by the patient.   Patient Health History  Berhane Murray being seen for neck pain.     Problem began: 5/6/2022.   Problem occurred: insidious   Pain is reported as 9/10 on pain scale.  General health as reported by patient is good.  Pertinent medical history includes: diabetes and overweight.   Red flags:  None as reported by patient.  Medical allergies: none.   Surgeries include:  None.    Current medications:  Anti-inflammatory and muscle relaxants.    Current occupation is .   Primary job tasks include:  Computer work and prolonged sitting (works from home).                  Therapist Generated HPI Evaluation  Problem details: Patient reports that she was out to dinner with her  and daughter and noticed onset of neck pain on 5-6-22. Her pain has progressively worsened since that time. She went to the Urgent Care on Saturday and was told she had an ear infection. She kept getting worse. She went to the ER early Sunday morning and was told it is her neck and not her ears and had a MRI to her neck which shows C7-T1 foraminal narrowing and also possibly at C6-7 on the L. It also shows some muscle/fluid in the upper cervical region-see results. The methocarbamol and ibuprofen she got at the ER are helping some to decrease her pain.   .         Type of problem:  Cervical spine.    This is a new condition.  Condition occurred with:  Insidious onset.  Where condition occurred: for unknown reasons.  Patient reports pain:  Central cervical spine and cervical right side.  Pain is described as shooting, sharp and other (throbbing) and is constant.  Pain radiates to:  Other (R UT). Pain is the same all the time.  Since onset symptoms are unchanged.  Associated symptoms:  Loss of motion/stiffness. Symptoms are exacerbated by looking up or down, rotating head, driving, lifting, sitting and other (turn to R with 10/10 pain, has to  sleep in the chair with neck supported and not sleeping well at all. )  and relieved by NSAID's and muscle relaxants.  Special tests included:  MRI (see results in Epic (ED report)).    Restrictions due to condition include:  Working in normal job without restrictions (works from home).  Barriers include:  None as reported by patient.                        Objective:  System              Cervical/Thoracic Evaluation  Arom wnl cervical: patient initally noted decrease in pain with seated retraction, but after 5-6 reps, pain increased into back of her head.     AROM:  AROM Cervical:    Flexion:            Mod loss and slight better  Extension:       Sign loss and increase  Rotation:         Left: mod to sign loss and ache on the R      Right: sign loss and increase  Side Bend:      Left: sign loss and increase     Right:  Sign loss and increase      Headaches: none  Cervical Myotomes:  normal                  DTR's:  not assessed          Cervical Dermatomes:  not assessed                    Cervical Palpation:    Tenderness present at Left:    Upper Trap; Levator; Erector Spinae and Suboccipitals  Tenderness present at Right:    Upper Trap; Levator; Erector Spinae and Suboccipitals        Cord Sign:  not assessed                                            Sherley Cervical Evaluation    Posture:  Sitting: fair    Protruding Head: yes  Wry Neck: no  Correction of Posture: better      Test Movements:      RET: During: decreases  After: no better    Repeat RET: After: worse  Mechanical Response: no effect                                                                     ROS    Assessment/Plan:    Patient is a 46 year old female with cervical complaints.    Patient has the following significant findings with corresponding treatment plan.                Diagnosis 1:  Acute cervical derangement  Pain -  hot/cold therapy, US, manual therapy, self management, education, directional preference exercise and home  program  Decreased ROM/flexibility - manual therapy, therapeutic exercise and home program  Impaired muscle performance - neuro re-education and home program  Decreased function - therapeutic activities and home program  Impaired posture - neuro re-education and home program    Therapy Evaluation Codes:   1) History comprised of:   Personal factors that impact the plan of care:      None.    Comorbidity factors that impact the plan of care are:      None.     Medications impacting care: None.  2) Examination of Body Systems comprised of:   Body structures and functions that impact the plan of care:      Cervical spine.   Activity limitations that impact the plan of care are:      Bathing, Cooking, Driving, Dressing, Lifting, Reading/Computer work, Sitting, Working, Sleeping and Laying down.  3) Clinical presentation characteristics are:   Stable/Uncomplicated.  4) Decision-Making    Low complexity using standardized patient assessment instrument and/or measureable assessment of functional outcome.  Cumulative Therapy Evaluation is: Low complexity.    Previous and current functional limitations:  (See Goal Flow Sheet for this information)    Short term and Long term goals: (See Goal Flow Sheet for this information)     Communication ability:  Patient appears to be able to clearly communicate and understand verbal and written communication and follow directions correctly.  Treatment Explanation - The following has been discussed with the patient:   RX ordered/plan of care  Anticipated outcomes  Possible risks and side effects  This patient would benefit from PT intervention to resume normal activities.   Rehab potential is good.    Frequency:  1-2 X week, once daily  Duration:  for 8 weeks  Discharge Plan:  Achieve all LTG.  Independent in home treatment program.  Reach maximal therapeutic benefit.    Please refer to the daily flowsheet for treatment today, total treatment time and time spent performing 1:1 timed  codes.

## 2022-05-10 PROBLEM — M54.2 NECK PAIN: Status: ACTIVE | Noted: 2022-05-10

## 2022-06-25 ENCOUNTER — HEALTH MAINTENANCE LETTER (OUTPATIENT)
Age: 47
End: 2022-06-25

## 2022-06-27 ENCOUNTER — PATIENT OUTREACH (OUTPATIENT)
Dept: FAMILY MEDICINE | Facility: CLINIC | Age: 47
End: 2022-06-27

## 2022-06-27 DIAGNOSIS — R87.810 CERVICAL HIGH RISK HPV (HUMAN PAPILLOMAVIRUS) TEST POSITIVE: ICD-10-CM

## 2022-06-27 NOTE — LETTER
June 27, 2022      Berhane Murray  [address] [place] [address]        Dear ,    This letter is to remind you that you are due for your follow-up Pap smear and Human Papillomavirus (HPV) test.    Please call [phone] to schedule your appointment at your earliest convenience.    If you have completed the appointment outside of the [facility] system, please have the records forwarded to our office. We will update your chart for your provider to review before your next annual wellness visit.     Thank you for choosing [facility]!      Sincerely,    Your [facility] Care Team

## 2022-08-09 PROBLEM — M54.2 NECK PAIN: Status: RESOLVED | Noted: 2022-05-10 | Resolved: 2022-08-09

## 2022-08-20 ENCOUNTER — HEALTH MAINTENANCE LETTER (OUTPATIENT)
Age: 47
End: 2022-08-20

## 2022-08-25 NOTE — TELEPHONE ENCOUNTER
FYI to provider - Patient is lost to pap tracking follow-up. Attempts to contact pt have been made per reminder process and there has been no reply and/or no appt scheduled. Contact hx listed below.     08/04/17 NIL Pap, + HR HPV (not 16 or 18). Plan cotest in 1 year.   12/21/18 NIL Pap, Neg HR HPV. Plan: cotest in 3 years  04/28/21 NIL Pap, + HR HPV (not 16 or 18) Plan cotest in 1 year due 04/28/22.    5/4/22 Physical - notes added- Cx  6/3/22 Physical - cx  6/27/22 Reminder letter  7/25/22 Reminder call - LM  8/25/22 Lost to follow-up for pap tracking

## 2022-11-20 ENCOUNTER — HEALTH MAINTENANCE LETTER (OUTPATIENT)
Age: 47
End: 2022-11-20

## 2022-12-24 ENCOUNTER — HEALTH MAINTENANCE LETTER (OUTPATIENT)
Age: 47
End: 2022-12-24

## 2023-01-17 RX ORDER — OLOPATADINE HYDROCHLORIDE 1 MG/ML
1 SOLUTION/ DROPS OPHTHALMIC 2 TIMES DAILY
OUTPATIENT
Start: 2023-01-17

## 2023-01-17 NOTE — TELEPHONE ENCOUNTER
Requested Prescriptions   Pending Prescriptions Disp Refills     olopatadine (PATANOL) 0.1 % ophthalmic solution       Sig: Place 1 drop into both eyes 2 times daily       There is no refill protocol information for this order            Nan Valadez MA

## 2023-02-28 ENCOUNTER — ANCILLARY PROCEDURE (OUTPATIENT)
Dept: MAMMOGRAPHY | Facility: CLINIC | Age: 48
End: 2023-02-28
Attending: FAMILY MEDICINE
Payer: COMMERCIAL

## 2023-02-28 ENCOUNTER — DOCUMENTATION ONLY (OUTPATIENT)
Dept: LAB | Facility: CLINIC | Age: 48
End: 2023-02-28

## 2023-02-28 DIAGNOSIS — Z12.31 VISIT FOR SCREENING MAMMOGRAM: ICD-10-CM

## 2023-02-28 DIAGNOSIS — R73.01 ELEVATED FASTING GLUCOSE: ICD-10-CM

## 2023-02-28 DIAGNOSIS — Z83.3 FAMILY HISTORY OF DIABETES MELLITUS: Primary | ICD-10-CM

## 2023-02-28 PROCEDURE — 77063 BREAST TOMOSYNTHESIS BI: CPT | Mod: TC | Performed by: RADIOLOGY

## 2023-02-28 PROCEDURE — 77067 SCR MAMMO BI INCL CAD: CPT | Mod: TC | Performed by: RADIOLOGY

## 2023-02-28 NOTE — PROGRESS NOTES
Berhane Murray has an upcoming lab appointment: 3/1 and an OV with Dr DANICA Toribio 4/4/23    Future Appointments   Date Time Provider Department Center   2/28/2023  3:00 PM [facility]   3/1/2023  3:30 PM BK LAB [facility]   4/4/2023 11:00 AM Shabana Toribio MD [facility]     Patient is scheduled for the following lab(s): none/ pended    There is no order available. Please review and place either future orders or HMPO (Review of Health Maintenance Protocol Orders), as appropriate.    Health Maintenance Due   Topic     ANNUAL REVIEW OF HM ORDERS      BMP      A1C      LIPID      MICROALBUMIN      Sima Aguirre

## 2023-03-01 ENCOUNTER — LAB (OUTPATIENT)
Dept: LAB | Facility: CLINIC | Age: 48
End: 2023-03-01
Payer: COMMERCIAL

## 2023-03-01 DIAGNOSIS — Z83.3 FAMILY HISTORY OF DIABETES MELLITUS: ICD-10-CM

## 2023-03-01 DIAGNOSIS — R73.01 ELEVATED FASTING GLUCOSE: ICD-10-CM

## 2023-03-01 LAB
ALBUMIN SERPL-MCNC: 3.7 G/DL (ref 3.4–5)
ALP SERPL-CCNC: 105 U/L (ref 40–150)
ALT SERPL W P-5'-P-CCNC: 36 U/L (ref 0–50)
ANION GAP SERPL CALCULATED.3IONS-SCNC: 5 MMOL/L (ref 3–14)
AST SERPL W P-5'-P-CCNC: 22 U/L (ref 0–45)
BILIRUB SERPL-MCNC: 0.3 MG/DL (ref 0.2–1.3)
BUN SERPL-MCNC: 14 MG/DL (ref 7–30)
CALCIUM SERPL-MCNC: 9.4 MG/DL (ref 8.5–10.1)
CHLORIDE BLD-SCNC: 108 MMOL/L (ref 94–109)
CHOLEST SERPL-MCNC: 190 MG/DL
CO2 SERPL-SCNC: 27 MMOL/L (ref 20–32)
CREAT SERPL-MCNC: 0.72 MG/DL (ref 0.52–1.04)
FASTING STATUS PATIENT QL REPORTED: NO
GFR SERPL CREATININE-BSD FRML MDRD: >90 ML/MIN/1.73M2
GLUCOSE BLD-MCNC: 81 MG/DL (ref 70–99)
HBA1C MFR BLD: 6 % (ref 0–5.6)
HDLC SERPL-MCNC: 47 MG/DL
LDLC SERPL CALC-MCNC: 124 MG/DL
NONHDLC SERPL-MCNC: 143 MG/DL
POTASSIUM BLD-SCNC: 4 MMOL/L (ref 3.4–5.3)
PROT SERPL-MCNC: 8 G/DL (ref 6.8–8.8)
SODIUM SERPL-SCNC: 140 MMOL/L (ref 133–144)
TRIGL SERPL-MCNC: 96 MG/DL

## 2023-03-01 PROCEDURE — 82570 ASSAY OF URINE CREATININE: CPT

## 2023-03-01 PROCEDURE — 36415 COLL VENOUS BLD VENIPUNCTURE: CPT

## 2023-03-01 PROCEDURE — 83036 HEMOGLOBIN GLYCOSYLATED A1C: CPT

## 2023-03-01 PROCEDURE — 80053 COMPREHEN METABOLIC PANEL: CPT

## 2023-03-01 PROCEDURE — 82043 UR ALBUMIN QUANTITATIVE: CPT

## 2023-03-01 PROCEDURE — 80061 LIPID PANEL: CPT

## 2023-03-02 LAB
CREAT UR-MCNC: 143 MG/DL
MICROALBUMIN UR-MCNC: 35 MG/L
MICROALBUMIN/CREAT UR: 24.48 MG/G CR (ref 0–25)

## 2023-03-23 ENCOUNTER — OFFICE VISIT (OUTPATIENT)
Dept: OPTOMETRY | Facility: CLINIC | Age: 48
End: 2023-03-23
Payer: COMMERCIAL

## 2023-03-23 DIAGNOSIS — H43.812 POSTERIOR VITREOUS DETACHMENT OF LEFT EYE: ICD-10-CM

## 2023-03-23 DIAGNOSIS — H10.13 ALLERGIC CONJUNCTIVITIS OF BOTH EYES: ICD-10-CM

## 2023-03-23 DIAGNOSIS — H52.4 PRESBYOPIA: ICD-10-CM

## 2023-03-23 DIAGNOSIS — H52.13 MYOPIA OF BOTH EYES: ICD-10-CM

## 2023-03-23 DIAGNOSIS — H02.889 MEIBOMIAN GLAND DYSFUNCTION: ICD-10-CM

## 2023-03-23 DIAGNOSIS — E11.9 TYPE 2 DIABETES MELLITUS WITHOUT COMPLICATION, WITHOUT LONG-TERM CURRENT USE OF INSULIN (H): Primary | ICD-10-CM

## 2023-03-23 DIAGNOSIS — H04.123 DRY EYE SYNDROME OF BOTH EYES: ICD-10-CM

## 2023-03-23 DIAGNOSIS — H52.223 REGULAR ASTIGMATISM OF BOTH EYES: ICD-10-CM

## 2023-03-23 PROCEDURE — 92015 DETERMINE REFRACTIVE STATE: CPT | Performed by: OPTOMETRIST

## 2023-03-23 PROCEDURE — 92004 COMPRE OPH EXAM NEW PT 1/>: CPT | Performed by: OPTOMETRIST

## 2023-03-23 RX ORDER — OLOPATADINE HYDROCHLORIDE 1 MG/ML
1 SOLUTION/ DROPS OPHTHALMIC 2 TIMES DAILY
Qty: 5 ML | Refills: 11 | Status: SHIPPED | OUTPATIENT
Start: 2023-03-23 | End: 2024-06-12

## 2023-03-23 ASSESSMENT — REFRACTION_MANIFEST
OS_ADD: +1.75
OD_ADD: +1.75
OS_CYLINDER: +0.50
OS_AXIS: 111
OS_SPHERE: -2.25
OD_SPHERE: -1.50

## 2023-03-23 ASSESSMENT — VISUAL ACUITY
OS_SC+: -2
METHOD: SNELLEN - LINEAR
OD_SC+: +2
OS_SC: 20/30+3
OS_SC: 20/60
OD_SC: 20/30-2
OD_SC: 20/40

## 2023-03-23 ASSESSMENT — CONF VISUAL FIELD
OD_SUPERIOR_TEMPORAL_RESTRICTION: 0
METHOD: COUNTING FINGERS
OS_SUPERIOR_TEMPORAL_RESTRICTION: 0
OS_SUPERIOR_NASAL_RESTRICTION: 0
OS_INFERIOR_TEMPORAL_RESTRICTION: 0
OD_INFERIOR_NASAL_RESTRICTION: 0
OS_INFERIOR_NASAL_RESTRICTION: 0
OD_SUPERIOR_NASAL_RESTRICTION: 0
OD_NORMAL: 1
OS_NORMAL: 1
OD_INFERIOR_TEMPORAL_RESTRICTION: 0

## 2023-03-23 ASSESSMENT — CUP TO DISC RATIO
OS_RATIO: 0.5
OD_RATIO: 0.5

## 2023-03-23 ASSESSMENT — TONOMETRY
IOP_METHOD: TONOPEN
OD_IOP_MMHG: 18
OS_IOP_MMHG: 18

## 2023-03-23 ASSESSMENT — SLIT LAMP EXAM - LIDS: COMMENTS: MEIBOMIAN GLAND DYSFUNCTION

## 2023-03-23 ASSESSMENT — EXTERNAL EXAM - RIGHT EYE: OD_EXAM: NORMAL

## 2023-03-23 ASSESSMENT — EXTERNAL EXAM - LEFT EYE: OS_EXAM: NORMAL

## 2023-03-23 NOTE — PROGRESS NOTES
Chief Complaint   Patient presents with     Diabetic Eye Exam     Vision is stable. Diabetes characteristics include controlled with diet. Duration of 1 year. Blood sugar level is controlled.     Chief Complaint(s) and History of Present Illness(es)    No visit information to display        Lab Results   Component Value Date    A1C 6.0 03/01/2023    A1C 6.2 04/28/2021    A1C 6.5 04/02/2021    A1C 5.6 12/21/2018       Last Eye Exam: 1 1/2 years ago- [facility]  Dilated Previously: Yes    What are you currently using to see?  does not use glasses or contacts    Distance Vision Acuity: Satisfied with vision    Near Vision Acuity: Not satisfied     Eye Comfort: dry/itchy/some discharge  Do you use eye drops? : Yes: Patanol- Systane-once a day as needed  Occupation or Hobbies: computer    New floater- left eye > right eye   Last few months- no flashes/loss of vision.    Feels like 1 eye is lazy- notices in pictures- appears to be left eye.   Patient shows me a picture.  Cover test is normal with no tropia or phoria noted.  In picture- right eyelid appears to be mildly retracted.       Her doctor has wanted her to see an endocrinologist due to thyroid.       Medical, surgical and family histories reviewed and updated 3/23/2023.       OBJECTIVE: See Ophthalmology exam    ASSESSMENT:    ICD-10-CM    1. 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 Allergic conjunctivitis of both eyes  H10.13 olopatadine (PATANOL) 0.1 % ophthalmic solution     EYE EXAM (SIMPLE-NONBILLABLE)      3. Dry eye syndrome of both eyes  H04.123 EYE EXAM (SIMPLE-NONBILLABLE)      4. Meibomian gland dysfunction  H02.889 EYE EXAM (SIMPLE-NONBILLABLE)      5. Posterior vitreous detachment of left eye  H43.812 EYE EXAM (SIMPLE-NONBILLABLE)      6. Myopia of both eyes  H52.13 REFRACTION      7. Regular astigmatism  of both eyes  H52.223 REFRACTION      8. Presbyopia  H52.4 REFRACTION          PLAN:    Berhane Murray aware  eye exam results will be sent to Shabana Toribio.  Patient Instructions   There are not any signs of the diabetes affecting the eyes today.  It is important that you get your eyes dilated once yearly and keep good control of your diabetes.    Patanol- 1 drop both eyes 2 x day as needed for itchy eyes.    Heat to the eyes daily for 10-15 minutes nightly with warm washcloth or reusable gel masks from the pharmacy or  Christine heat masks can be purchased at Amazon.    Systane Complete 1 drop both eyes 2-4 x day.    We Love Eyes- tea tree oil eyelid  foaming cleanser to cleanse eyelids 2 x day or eye make up oil at night.  www.[email or web address] for amazon .com.    You have a PVD- posterior vitreous detachment which is due to the gel of the eye shrinking and clumping together.  This can sometimes cause holes or tears in the retina.  The signs of a retinal detachment are flashes of light or a curtain coming over the vision. If you notice any of these changes please let me know right away.  If I am not available this is an emergency type situation that you would need to be seen. I recommend the [facility] Emergency Room or call [phone].    Eyeglass prescription given.  Your options are a bifocal which would allow you to see distance and near vision or separate glasses for distance and taking them off for reading.    Follow up with thyroid as recommended by PCP.    Return in 1 year for a complete eye exam or sooner if needed.    Osmar Hill, PIEDAD

## 2023-03-23 NOTE — LETTER
3/23/2023         RE: Berhane Murray  [address]  [place] [address]        Dear Colleague,    Thank you for referring your patient, Berhane Murray, to the [facility]. Please see a copy of my visit note below.    Chief Complaint   Patient presents with     Diabetic Eye Exam     Vision is stable. Diabetes characteristics include controlled with diet. Duration of 1 year. Blood sugar level is controlled.     Chief Complaint(s) and History of Present Illness(es)    No visit information to display        Lab Results   Component Value Date    A1C 6.0 03/01/2023    A1C 6.2 04/28/2021    A1C 6.5 04/02/2021    A1C 5.6 12/21/2018       Last Eye Exam: 1 1/2 years ago- [facility]  Dilated Previously: Yes    What are you currently using to see?  does not use glasses or contacts    Distance Vision Acuity: Satisfied with vision    Near Vision Acuity: Not satisfied     Eye Comfort: dry/itchy/some discharge  Do you use eye drops? : Yes: Patanol- Systane-once a day as needed  Occupation or Hobbies: computer    New floater- left eye > right eye   Last few months- no flashes/loss of vision.    Feels like 1 eye is lazy- notices in pictures- appears to be left eye.   Patient shows me a picture.  Cover test is normal with no tropia or phoria noted.  In picture- right eyelid appears to be mildly retracted.       Her doctor has wanted her to see an endocrinologist due to thyroid.       Medical, surgical and family histories reviewed and updated 3/23/2023.       OBJECTIVE: See Ophthalmology exam    ASSESSMENT:    ICD-10-CM    1. 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 Allergic conjunctivitis of both eyes  H10.13 olopatadine (PATANOL) 0.1 % ophthalmic solution     EYE EXAM (SIMPLE-NONBILLABLE)      3. Dry eye syndrome of both eyes  H04.123 EYE EXAM  (SIMPLE-NONBILLABLE)      4. Meibomian gland dysfunction  H02.889 EYE EXAM (SIMPLE-NONBILLABLE)      5. Posterior vitreous detachment of left eye  H43.812 EYE EXAM (SIMPLE-NONBILLABLE)      6. Myopia of both eyes  H52.13 REFRACTION      7. Regular astigmatism of both eyes  H52.223 REFRACTION      8. Presbyopia  H52.4 REFRACTION          PLAN:    Berhane cuba  eye exam results will be sent to Shabana Toribio.  Patient Instructions   There are not any signs of the diabetes affecting the eyes today.  It is important that you get your eyes dilated once yearly and keep good control of your diabetes.    Patanol- 1 drop both eyes 2 x day as needed for itchy eyes.    Heat to the eyes daily for 10-15 minutes nightly with warm washcloth or reusable gel masks from the pharmacy or  [employer] heat masks can be purchased at Amazon.    Systane Complete 1 drop both eyes 2-4 x day.    We Love Eyes- tea tree oil eyelid  foaming cleanser to cleanse eyelids 2 x day or eye make up oil at night.  www.weloveyesxo.com for amazon .com.    You have a PVD- posterior vitreous detachment which is due to the gel of the eye shrinking and clumping together.  This can sometimes cause holes or tears in the retina.  The signs of a retinal detachment are flashes of light or a curtain coming over the vision. If you notice any of these changes please let me know right away.  If I am not available this is an emergency type situation that you would need to be seen. I recommend the [facility] Emergency Room or call [phone].    Eyeglass prescription given.  Your options are a bifocal which would allow you to see distance and near vision or separate glasses for distance and taking them off for reading.    Follow up with thyroid as recommended by PCP.    Return in 1 year for a complete eye exam or sooner if needed.    Osmar Hill, OD                   Again, thank you for allowing me to participate in the care of your patient.         Sincerely,        Osmar Hill, OD

## 2023-03-23 NOTE — PATIENT INSTRUCTIONS
There are not any signs of the diabetes affecting the eyes today.  It is important that you get your eyes dilated once yearly and keep good control of your diabetes.    Patanol- 1 drop both eyes 2 x day as needed for itchy eyes.    Heat to the eyes daily for 10-15 minutes nightly with warm washcloth or reusable gel masks from the pharmacy or  Christine heat masks can be purchased at Amazon.    Systane Complete 1 drop both eyes 2-4 x day.    We Love Eyes- tea tree oil eyelid  foaming cleanser to cleanse eyelids 2 x day or eye make up oil at night.  www.[email or web address] for amazon .com.    You have a PVD- posterior vitreous detachment which is due to the gel of the eye shrinking and clumping together.  This can sometimes cause holes or tears in the retina.  The signs of a retinal detachment are flashes of light or a curtain coming over the vision. If you notice any of these changes please let me know right away.  If I am not available this is an emergency type situation that you would need to be seen. I recommend the [facility] Emergency Room or call [phone].    Eyeglass prescription given.  Your options are a bifocal which would allow you to see distance and near vision or separate glasses for distance and taking them off for reading.    Follow up with thyroid as recommended by PCP.    Return in 1 year for a complete eye exam or sooner if needed.    Osmar Hill, OD    The affects of the dilating drops last for 4- 6 hours.  You will be more sensitive to light and vision will be blurry up close.  Do not drive if you do not feel comfortable.  Mydriatic sunglasses were given if needed.    Patient Education   Diabetes weakens the blood vessels all over the body, including the eyes. Damage to the blood vessels in the eyes can cause swelling or bleeding into part of the eye (called the retina). This is called diabetic retinopathy (ZANE-tin-AH-puh-thee). If not treated, this disease can cause vision  loss or blindness.   Symptoms may include blurred or distorted vision, but many people have no symptoms. It's important to see your eye doctor regularly to check for problems.   Early treatment and good control can help protect your vision. Here are the things you can do to help prevent vision loss:      1. Keep your blood sugar levels under tight control.      2. Bring high blood pressure under control.      3. No smoking.      4. Have yearly dilated eye exams.       Optometry Providers       Clinic Locations                                 Telephone Number   Dr. Sabiha Rivera   [facility]  [facility] [phone]     [facility] Hours:                [place] [facility] Hours:       [place] [facility] Hours:   [address]   [address]      [address] [place] [address]   [place] [address]    [place] [address]  Phone: [phone]                    Phone: [phone]     Phone: [phone]                      Monday 8:00-6:00                          Monday 8:00-6:00                          Monday 8:00-6:00              Tuesday 8:00-6:00                          Tuesday 8:00-6:00                          Tuesday 8:00-6:00              Wednesday 8:00-6:00                  Wednesday 8:00-6:00                   Wednesday 8:00-6:00      Thursday 8:00-6:00                        Thursday 8:00-6:00                         Thursday 8:00-6:00            Friday 8:00-5:00                              Friday 8:00-5:00                              Friday 8:00-5:00    [facility] Hours:   [address] [place] [address]  [phone]    Monday 9:00-6:00  Tuesday 9:00-6:00  Wednesday 9:00-6:00  Thursday 9:00-6:00  Friday 9:00-5:00  Please log on to [email or web address] to order your contact lenses.  The link is found on the [facility] page.  As always,  Thank you for trusting us with your health care needs!    There is a combination of three treatments which can greatly improve symptoms of dry eyes.     Artificial tears  Heat (eyes closed)  Eyelid and eyelash cleansing (eyes closed)     Use one drop of artificial tears both eyes 4 x daily.  Once in the morning, lunch, dinner and bedtime. Continue to use the drops regardless if your eyes are comfortable or not.  Artificial tears work best as a preventative and not as well after your eyes are starting to bother you.  It may take 4- 6 weeks of using the drops before you notice improvement.  If after that time you are still having problems schedule an appointment for an evaluation and discussion of different treatments such as Restasis or Xiidra.  Dry eyes are a chronic condition and you may have more symptoms at certain times of the year.    Excess tearing can be due to the right tears not working properly or a blockage in the tear drainage system.  You can try using artificial tears 1 drop both eyes 4 x day.  If the excess tearing is bothersome after 4-6 weeks of treatment then we can send you for further testing.  This would entail a referral to our oculoplastic specialist Dr. Laura Hinson at the [facility]-[phone].    Recommended brands are:    Systane Complete  Systane Ultra  Systane Balance  Refresh Advanced Optive  Refresh Relieva  Blink    Recommended brands for contact lens wearers are:    Systane contacts  Refresh contacts  Blink contacts    If you are using drops more than 4 x day or have sensitivities to preservatives I recommend non preserved artificial tears.  These come in 1 use vials.  They can be used every 1-2 hours.  Do not reuse the vials.    Recommended brands are:    Refresh Optive Terry-3  Systane- preservative free  Refresh-  preservative free  Blink- preservative free    Gels or ointment can be used at night.    Recommended brands are:    Systane Gel  Refresh Gel  Blink  Gel  Genteal Gel    Systane night time (ointment)  Refresh Celluvisc  Refresh PM (ointment)      Visine, Clear Eyes or Murine (drops that get the red out) can irritate the eyes and cause a rebound effect where the eyes become more red and you end up using more drops.  Avoid drops containing tetrahydrozoline, naphazoline, phenylephrine, oxymetazoline.      OTC Lumify is a newer product that gives immediate redness relief without the rebound effect.  Use as needed to take the redness out.    Artificial tears may be used with other drops (such as allergy, glaucoma, antibiotics) around the same time.  Be sure to wait 5 minutes in between drops.    Heat to the eyelids can also improve your symptoms of dry eyes.  Christine heat masks can be purchased at Amazon to be used nightly for 10-15 minutes.  Other options are gel masks that can be put in the microwave and purchased at most pharmacies.      Tea Tree Oil eyelid cleansers recommended are Ocusoft Oust foam cleanser to cleanse eyelids/lashes at night and in the am. Other options are Blephadex or Cliradex eyelid wipes.  KEEP EYES CLOSED when using these products.  These can be purchased on amazon.com   A good product for make up remover with tea tree oil is WeLoveEyes.  This can be found at www.[employer] or Sape.    Other good eyelid cleansers have hypochlorous which removes excess bacteria and is safe around the eyes. Products are Avenova, Ocusoft Hypochlor or Heyedrate. Spray solution onto cotton pad, close eyes and gently apply to eyelids and eyelashes using side to side motion.  You can also KEEP EYES CLOSED spray and rub into eyelashes.  You do not need to rinse it off. Use morning and evening. These products can be found on Amazon.  You can check with your local pharmacy and see if they can order if for you if they don't have it.    Other brands of eyelid cleansing wipes are:    Ocusoft wipes  Systane wipes    A great eye make up line is  [email or web address].[email or web address]

## 2023-05-16 ENCOUNTER — OFFICE VISIT (OUTPATIENT)
Dept: FAMILY MEDICINE | Facility: CLINIC | Age: 48
End: 2023-05-16
Payer: COMMERCIAL

## 2023-05-16 VITALS
DIASTOLIC BLOOD PRESSURE: 82 MMHG | HEIGHT: 64 IN | SYSTOLIC BLOOD PRESSURE: 124 MMHG | HEART RATE: 70 BPM | WEIGHT: 217 LBS | RESPIRATION RATE: 16 BRPM | OXYGEN SATURATION: 97 % | TEMPERATURE: 97.2 F | BODY MASS INDEX: 37.05 KG/M2

## 2023-05-16 DIAGNOSIS — R73.01 ELEVATED FASTING GLUCOSE: ICD-10-CM

## 2023-05-16 DIAGNOSIS — B37.31 YEAST INFECTION OF THE VAGINA: ICD-10-CM

## 2023-05-16 DIAGNOSIS — Z12.11 SCREEN FOR COLON CANCER: ICD-10-CM

## 2023-05-16 DIAGNOSIS — Z12.4 CERVICAL CANCER SCREENING: ICD-10-CM

## 2023-05-16 DIAGNOSIS — J20.9 ACUTE BRONCHITIS WITH SYMPTOMS > 10 DAYS: ICD-10-CM

## 2023-05-16 DIAGNOSIS — J30.89 SEASONAL ALLERGIC RHINITIS DUE TO OTHER ALLERGIC TRIGGER: ICD-10-CM

## 2023-05-16 DIAGNOSIS — R06.83 SNORING: ICD-10-CM

## 2023-05-16 DIAGNOSIS — Z00.00 ENCOUNTER FOR ROUTINE ADULT HEALTH EXAMINATION WITHOUT ABNORMAL FINDINGS: Primary | ICD-10-CM

## 2023-05-16 DIAGNOSIS — R21 RASH: ICD-10-CM

## 2023-05-16 LAB
ALBUMIN SERPL BCG-MCNC: 4.3 G/DL (ref 3.5–5.2)
ALP SERPL-CCNC: 110 U/L (ref 35–104)
ALT SERPL W P-5'-P-CCNC: 34 U/L (ref 10–35)
ANION GAP SERPL CALCULATED.3IONS-SCNC: 10 MMOL/L (ref 7–15)
AST SERPL W P-5'-P-CCNC: 27 U/L (ref 10–35)
BILIRUB SERPL-MCNC: 0.4 MG/DL
BUN SERPL-MCNC: 11.6 MG/DL (ref 6–20)
CALCIUM SERPL-MCNC: 9.3 MG/DL (ref 8.6–10)
CHLORIDE SERPL-SCNC: 104 MMOL/L (ref 98–107)
CHOLEST SERPL-MCNC: 183 MG/DL
CLUE CELLS: ABNORMAL
CREAT SERPL-MCNC: 0.81 MG/DL (ref 0.51–0.95)
DEPRECATED HCO3 PLAS-SCNC: 26 MMOL/L (ref 22–29)
GFR SERPL CREATININE-BSD FRML MDRD: 89 ML/MIN/1.73M2
GLUCOSE SERPL-MCNC: 107 MG/DL (ref 70–99)
HBA1C MFR BLD: 5.9 % (ref 0–5.6)
HDLC SERPL-MCNC: 44 MG/DL
LDLC SERPL CALC-MCNC: 124 MG/DL
NONHDLC SERPL-MCNC: 139 MG/DL
POTASSIUM SERPL-SCNC: 4 MMOL/L (ref 3.4–5.3)
PROT SERPL-MCNC: 7.8 G/DL (ref 6.4–8.3)
SODIUM SERPL-SCNC: 140 MMOL/L (ref 136–145)
TRICHOMONAS, WET PREP: ABNORMAL
TRIGL SERPL-MCNC: 75 MG/DL
TSH SERPL DL<=0.005 MIU/L-ACNC: 1.3 UIU/ML (ref 0.3–4.2)
WBC'S/HIGH POWER FIELD, WET PREP: ABNORMAL
YEAST, WET PREP: PRESENT

## 2023-05-16 PROCEDURE — 87210 SMEAR WET MOUNT SALINE/INK: CPT | Performed by: FAMILY MEDICINE

## 2023-05-16 PROCEDURE — 99396 PREV VISIT EST AGE 40-64: CPT | Performed by: FAMILY MEDICINE

## 2023-05-16 PROCEDURE — 36415 COLL VENOUS BLD VENIPUNCTURE: CPT | Performed by: FAMILY MEDICINE

## 2023-05-16 PROCEDURE — 80061 LIPID PANEL: CPT | Performed by: FAMILY MEDICINE

## 2023-05-16 PROCEDURE — 83036 HEMOGLOBIN GLYCOSYLATED A1C: CPT | Performed by: FAMILY MEDICINE

## 2023-05-16 PROCEDURE — 87624 HPV HI-RISK TYP POOLED RSLT: CPT | Performed by: FAMILY MEDICINE

## 2023-05-16 PROCEDURE — 99214 OFFICE O/P EST MOD 30 MIN: CPT | Mod: 25 | Performed by: FAMILY MEDICINE

## 2023-05-16 PROCEDURE — 80053 COMPREHEN METABOLIC PANEL: CPT | Performed by: FAMILY MEDICINE

## 2023-05-16 PROCEDURE — G0145 SCR C/V CYTO,THINLAYER,RESCR: HCPCS | Performed by: FAMILY MEDICINE

## 2023-05-16 PROCEDURE — 84443 ASSAY THYROID STIM HORMONE: CPT | Performed by: FAMILY MEDICINE

## 2023-05-16 RX ORDER — FLUCONAZOLE 150 MG/1
150 TABLET ORAL ONCE
Qty: 1 TABLET | Refills: 0 | Status: SHIPPED | OUTPATIENT
Start: 2023-05-16 | End: 2023-05-16

## 2023-05-16 RX ORDER — ALBUTEROL SULFATE 90 UG/1
2 AEROSOL, METERED RESPIRATORY (INHALATION) EVERY 6 HOURS
Qty: 18 G | Refills: 3 | Status: SHIPPED | OUTPATIENT
Start: 2023-05-16

## 2023-05-16 RX ORDER — NYSTATIN 100000 [USP'U]/G
POWDER TOPICAL 2 TIMES DAILY
Qty: 60 G | Refills: 1 | Status: SHIPPED | OUTPATIENT
Start: 2023-05-16 | End: 2024-06-12

## 2023-05-16 ASSESSMENT — ENCOUNTER SYMPTOMS
CHILLS: 0
CONSTIPATION: 0
HEMATOCHEZIA: 0
HEMATURIA: 0
COUGH: 0
ABDOMINAL PAIN: 0

## 2023-05-16 ASSESSMENT — PAIN SCALES - GENERAL: PAINLEVEL: NO PAIN (0)

## 2023-05-16 ASSESSMENT — ASTHMA QUESTIONNAIRES
QUESTION_3 LAST FOUR WEEKS HOW OFTEN DID YOUR ASTHMA SYMPTOMS (WHEEZING, COUGHING, SHORTNESS OF BREATH, CHEST TIGHTNESS OR PAIN) WAKE YOU UP AT NIGHT OR EARLIER THAN USUAL IN THE MORNING: NOT AT ALL
QUESTION_5 LAST FOUR WEEKS HOW WOULD YOU RATE YOUR ASTHMA CONTROL: WELL CONTROLLED
QUESTION_4 LAST FOUR WEEKS HOW OFTEN HAVE YOU USED YOUR RESCUE INHALER OR NEBULIZER MEDICATION (SUCH AS ALBUTEROL): NOT AT ALL
ACT_TOTALSCORE: 23
QUESTION_1 LAST FOUR WEEKS HOW MUCH OF THE TIME DID YOUR ASTHMA KEEP YOU FROM GETTING AS MUCH DONE AT WORK, SCHOOL OR AT HOME: A LITTLE OF THE TIME
QUESTION_2 LAST FOUR WEEKS HOW OFTEN HAVE YOU HAD SHORTNESS OF BREATH: NOT AT ALL
ACT_TOTALSCORE: 23

## 2023-05-16 NOTE — PROGRESS NOTES
SUBJECTIVE:   CC: Berhane is an 47 year old who presents for preventive health visit.       5/16/2023     7:00 AM   Additional Questions   Roomed by Nadine FRANCISCO   Patient has been advised of split billing requirements and indicates understanding: Yes  Healthy Habits:     Getting at least 3 servings of Calcium per day:  Yes    Bi-annual eye exam:  Yes    Dental care twice a year:  Yes    Sleep apnea or symptoms of sleep apnea:  Daytime drowsiness and Excessive snoring    Diet:  Low salt, Diabetic, Carbohydrate counting and Gluten-free/reduced    Frequency of exercise:  1 day/week    Duration of exercise:  15-30 minutes    Taking medications regularly:  Yes    Medication side effects:  Not applicable    PHQ-2 Total Score: 0    Additional concerns today:  Yes (discoloration under breast and armpit, had mammogram in Feb 2023)    Elevated fasting glucose , she has been doing healthy diet and exercise , will check HGb A1 c today   2) allergic rhinitis , which had caused chronic cough 2 yrs ago and htne she saw an allergist and determined she has allergies     Today's PHQ-2 Score:       5/16/2023     6:57 AM   PHQ-2 ( 1999 [employer])   Q1: Little interest or pleasure in doing things 0   Q2: Feeling down, depressed or hopeless 0   PHQ-2 Score 0   Q1: Little interest or pleasure in doing things Not at all    Not at all   Q2: Feeling down, depressed or hopeless Not at all    Not at all   PHQ-2 Score 0    0           Social History     Tobacco Use     Smoking status: Never     Smokeless tobacco: Never   Vaping Use     Vaping status: Never Used   Substance Use Topics     Alcohol use: Yes             5/16/2023     6:57 AM   Alcohol Use   Prescreen: >3 drinks/day or >7 drinks/week? No          View : No data to display.              Reviewed orders with patient.  Reviewed health maintenance and updated orders accordingly - Yes  Lab work is in process  Labs reviewed in EPIC  BP Readings from Last 3 Encounters:   05/16/23 124/82    05/07/22 (!) 144/84   05/25/21 114/82    Wt Readings from Last 3 Encounters:   05/16/23 98.4 kg (217 lb)   05/07/22 99.1 kg (218 lb 6.4 oz)   05/26/21 98.9 kg (218 lb)                  Patient Active Problem List   Diagnosis     CARDIOVASCULAR SCREENING; LDL GOAL LESS THAN 160     Cervical high risk HPV (human papillomavirus) test positive     Diabetes mellitus, type 2 (H)     Morbid obesity (H)     Allergic rhinitis due to pollen, unspecified seasonality     Wheezing     Cough     Conjunctivitis of both eyes, unspecified conjunctivitis type     No past surgical history on file.    Social History     Tobacco Use     Smoking status: Never     Smokeless tobacco: Never   Vaping Use     Vaping status: Never Used   Substance Use Topics     Alcohol use: Yes     Family History   Problem Relation Age of Onset     Breast Cancer Mother          Current Outpatient Medications   Medication Sig Dispense Refill     albuterol (PROAIR HFA/PROVENTIL HFA/VENTOLIN HFA) 108 (90 Base) MCG/ACT inhaler Inhale 2 puffs into the lungs every 6 hours 18 g 3     alcohol swab prep pads Use to swab area of injection/jacqueline as directed. 100 each 3     blood glucose (NO BRAND SPECIFIED) test strip Use to test blood sugar 1 times daily or as directed. To accompany: Blood Glucose Monitor Brands: per insurance. 100 strip 6     blood glucose calibration (NO BRAND SPECIFIED) solution To accompany: Blood Glucose Monitor Brands: per insurance. 1 each 1     blood glucose monitoring (NO BRAND SPECIFIED) meter device kit Use to test blood sugar 1 times daily or as directed. Preferred blood glucose meter OR supplies to accompany: Blood Glucose Monitor Brands: per insurance. 1 kit 0     fluticasone (ARNUITY ELLIPTA) 200 MCG/ACT inhaler Inhale 1 puff into the lungs daily 1 each 4     fluticasone (FLONASE) 50 MCG/ACT nasal spray Spray 1 spray into both nostrils daily 16 g 3     fluticasone-vilanterol (BREO ELLIPTA) 200-25 MCG/INH inhaler Inhale 1 puff into the  lungs daily 1 each 3     montelukast (SINGULAIR) 10 MG tablet Take 1 tablet (10 mg) by mouth every evening 45 tablet 3     naphazoline-pheniramine (OPCON-A) SOLN ophthalmic solution 1-2 drops 4 times daily as needed for irritation       olopatadine (PATANOL) 0.1 % ophthalmic solution Place 1 drop into both eyes 2 times daily 5 mL 11     thin (NO BRAND SPECIFIED) lancets Use with lanceting device. To accompany: Blood Glucose Monitor Brands: per insurance. 100 each 6     Allergies   Allergen Reactions     Prednisone      Fainted after competing full dose.      Recent Labs   Lab Test 23  0747 23  1557 21  1113 21  1032 21  1031 18  1506   A1C 5.9* 6.0* 6.2*   < >  --  5.6   LDL  --  124* 112*  --  120* 136*   HDL  --  47* 48*  --  37* 44*   TRIG  --  96 70  --  102 80   ALT  --  36  --   --   --   --    CR  --  0.72  --   --   --   --    GFRESTIMATED  --  >90  --   --   --   --    POTASSIUM  --  4.0  --   --   --   --    TSH  --   --  0.64  --   --  0.64    < > = values in this interval not displayed.        Breast Cancer Screenin/28/2021    10:35 AM 2023     6:58 AM   Breast CA Risk Assessment (FHS-7)   Do you have a family history of breast, colon, or ovarian cancer? Yes No / Unknown         Mammogram Screening: Recommended annual mammography  Pertinent mammograms are reviewed under the imaging tab.    History of abnormal Pap smear: NO - age 30- 65 PAP every 3 years recommended      Latest Ref Rng & Units 2021    12:34 PM 2021    10:54 AM 2018     3:26 PM   PAP / HPV   PAP (Historical)   NIL      HPV 16 DNA NEG^Negative Negative    Negative     HPV 18 DNA NEG^Negative Negative    Negative     Other HR HPV NEG^Negative Positive    Negative       Reviewed and updated as needed this visit by clinical staff   Tobacco  Allergies  Meds              Reviewed and updated as needed this visit by Provider                 Past Medical History:   Diagnosis  Date     Cervical high risk HPV (human papillomavirus) test positive 08/04/2017 08/04/17, 04/28/21      No past surgical history on file.    Review of Systems   Constitutional: Negative for chills.   HENT: Negative for congestion.    Respiratory: Negative for cough.    Cardiovascular: Negative for chest pain.   Gastrointestinal: Negative for abdominal pain, constipation and hematochezia.   Genitourinary: Negative for hematuria.     CONSTITUTIONAL: NEGATIVE for fever, chills, change in weight  INTEGUMENTARY/SKIN: NEGATIVE for worrisome rashes, moles or lesions  EYES: NEGATIVE for vision changes or irritation  ENT: NEGATIVE for ear, mouth and throat problems  RESP: NEGATIVE for significant cough or SOB  BREAST: NEGATIVE for masses, tenderness or discharge  CV: NEGATIVE for chest pain, palpitations or peripheral edema  GI: NEGATIVE for nausea, abdominal pain, heartburn, or change in bowel habits  : NEGATIVE for unusual urinary or vaginal symptoms. No vaginal bleeding.  MUSCULOSKELETAL: NEGATIVE for significant arthralgias or myalgia  NEURO: NEGATIVE for weakness, dizziness or paresthesias  PSYCHIATRIC: NEGATIVE for changes in mood or affect      OBJECTIVE:   LMP  (LMP Unknown)   Physical Exam  GENERAL: healthy, alert and no distress  EYES: Eyes grossly normal to inspection, PERRL and conjunctivae and sclerae normal  HENT: ear canals and TM's normal, nose and mouth without ulcers or lesions  NECK: no adenopathy, no asymmetry, masses, or scars and thyroid normal to palpation  RESP: lungs clear to auscultation - no rales, rhonchi or wheezes  BREAST: normal without masses, tenderness or nipple discharge and no palpable axillary masses or adenopathy  CV: regular rate and rhythm, normal S1 S2, no S3 or S4, no murmur, click or rub, no peripheral edema and peripheral pulses strong  ABDOMEN: soft, nontender, no hepatosplenomegaly, no masses and bowel sounds normal   (female): normal female external genitalia, normal  urethral meatus, vaginal mucosa pink, moist, well rugated, and normal cervix/adnexa/uterus without masses or discharge  MS: no gross musculoskeletal defects noted, no edema  SKIN: no suspicious lesions or rashes  NEURO: Normal strength and tone, mentation intact and speech normal  PSYCH: mentation appears normal, affect normal/bright    Diagnostic Test Results:  Labs reviewed in Epic  Results for orders placed or performed in visit on 05/16/23 (from the past 24 hour(s))   Hemoglobin A1c   Result Value Ref Range    Hemoglobin A1C 5.9 (H) 0.0 - 5.6 %   Wet preparation    Specimen: Vagina; Swab   Result Value Ref Range    Trichomonas Absent Absent    Yeast Present (A) Absent    Clue Cells Absent Absent    WBCs/high power field None None       ASSESSMENT/PLAN:   (Z00.00) Encounter for routine adult health examination without abnormal findings  (primary encounter diagnosis)  Comment: Discussed diet,calcium,exercise.Went over self breast exam.Thin prep was done.Eyes and teeth UTD.No immunizations needed today.See orders below for tests ordered and screening needed.    Plan: REVIEW OF HEALTH MAINTENANCE PROTOCOL ORDERS,         Lipid panel reflex to direct LDL Fasting, TSH         with free T4 reflex, Comprehensive metabolic         panel (BMP + Alb, Alk Phos, ALT, AST, Total.         Bili, TP), Wet preparation            (J30.89) Seasonal allergic rhinitis due to other allergic trigger  Comment: now on Claritin daily for the allergies which is helping   Plan: doing well on the Claritin and has not needed the flonase     (J20.9) Acute bronchitis with symptoms > 10 days  Comment: refilled just in case  , although since taking Claritin daily she has not had any wheezing and on claritin which is controlloling her allergies   Plan: albuterol (PROAIR HFA/PROVENTIL HFA/VENTOLIN         HFA) 108 (90 Base) MCG/ACT inhaler        As above     (R06.83) Snoring  Comment: she needs to do the sleep study so I gave her a referral for a  sleep eval as she snores and potentially could have MIKE   Plan: Adult Sleep Eval & Management          Referral            (R73.01) Elevated fasting glucose  Comment: will check today   Plan: Hemoglobin A1c, Comprehensive metabolic panel         (BMP + Alb, Alk Phos, ALT, AST, Total. Bili,         TP)        Now her Hgb A1 c is 5.9     (Z12.11) Screen for colon cancer  Comment: will do the colonoscopy as she is due   Plan: Colonoscopy Screening  Referral        As above     (Z12.4) Cervical cancer screening  Comment: will screen today as she is due   Plan: Pap Screen with HPV - recommended age 30 - 65         years          Vaginal yeast on the wet prep today- sent a Rx for Diflucan one tablet and can repeat in 5 to 7 days   Rash/skin yeast , Rx for Nystatin powder sent       Patient has been advised of split billing requirements and indicates understanding: Yes      COUNSELING:  Reviewed preventive health counseling, as reflected in patient instructions       Regular exercise       Healthy diet/nutrition       Vision screening        She reports that she has never smoked. She has never used smokeless tobacco.      Shabana Toribio MD  [facility]

## 2023-05-17 ENCOUNTER — OFFICE VISIT (OUTPATIENT)
Dept: URGENT CARE | Facility: URGENT CARE | Age: 48
End: 2023-05-17
Payer: COMMERCIAL

## 2023-05-17 VITALS
OXYGEN SATURATION: 99 % | RESPIRATION RATE: 16 BRPM | WEIGHT: 216.4 LBS | HEART RATE: 88 BPM | SYSTOLIC BLOOD PRESSURE: 117 MMHG | DIASTOLIC BLOOD PRESSURE: 83 MMHG | BODY MASS INDEX: 37.73 KG/M2 | TEMPERATURE: 99.4 F

## 2023-05-17 DIAGNOSIS — H66.002 NON-RECURRENT ACUTE SUPPURATIVE OTITIS MEDIA OF LEFT EAR WITHOUT SPONTANEOUS RUPTURE OF TYMPANIC MEMBRANE: Primary | ICD-10-CM

## 2023-05-17 PROCEDURE — 99213 OFFICE O/P EST LOW 20 MIN: CPT | Performed by: NURSE PRACTITIONER

## 2023-05-17 RX ORDER — AMOXICILLIN 500 MG/1
1000 CAPSULE ORAL 2 TIMES DAILY
Qty: 40 CAPSULE | Refills: 0 | Status: SHIPPED | OUTPATIENT
Start: 2023-05-17 | End: 2023-05-27

## 2023-05-17 ASSESSMENT — PAIN SCALES - GENERAL: PAINLEVEL: SEVERE PAIN (6)

## 2023-05-18 LAB
BKR LAB AP GYN ADEQUACY: NORMAL
BKR LAB AP GYN INTERPRETATION: NORMAL
BKR LAB AP HPV REFLEX: NORMAL
BKR LAB AP PREVIOUS ABNORMAL: NORMAL
PATH REPORT.COMMENTS IMP SPEC: NORMAL
PATH REPORT.COMMENTS IMP SPEC: NORMAL
PATH REPORT.RELEVANT HX SPEC: NORMAL

## 2023-05-18 NOTE — PATIENT INSTRUCTIONS
Patient Education     Acute Otitis Media with Infection (Child)    Your child has a middle ear infection (acute otitis media). It's caused by bacteria or viruses. The middle ear is the space behind the eardrum. The eustachian tube connects the ear to the nasal passage. The eustachian tubes help drain fluid from the ears. They also keep the air pressure equal inside and outside the ears. These tubes are shorter and more horizontal in children. This makes it more likely for the tubes to become blocked. A blockage lets fluid and pressure build up in the middle ear. Bacteria or fungi can grow in this fluid and cause an ear infection. This infection is commonly known as an earache.  The main symptom of an ear infection is ear pain. Other symptoms may include pulling at the ear, being more fussy than usual, fever, decreased appetite, and vomiting or diarrhea. Your child s hearing may also be affected. Your child may have had a respiratory infection first.  An ear infection may clear up on its own. Or your child may need to take medicine. After the infection goes away, your child may still have fluid in the middle ear. It may take weeks or months for this fluid to go away. During that time, your child may have temporary hearing loss. But all other symptoms of the earache should be gone.  Home care  Follow these guidelines when caring for your child at home:  The healthcare provider will likely prescribe medicines for pain. The provider may also prescribe antibiotics to treat the infection. These may be liquid medicines to give by mouth. Or they may be ear drops. Follow the provider s instructions for giving these medicines to your child. Don't give your child any other medicine without first asking your child's healthcare provider, especially the first time.  Because ear infections can clear up on their own, the provider may suggest waiting for a few days before giving your child medicines for infection.  To reduce pain,  have your child rest in an upright position. Hot or cold compresses held against the ear may help ease pain.  Don't smoke in the house or around your child. Keep your child away from secondhand smoke.  To help prevent future infections:  Don't smoke near your child. Secondhand smoke raises the risk for ear infections in children.  Make sure your child gets all appropriate vaccines.  Don't bottle-feed while your baby is lying on his or her back. (This position can cause middle ear infections because it allows milk to run into the eustachian tubes.)      If you breastfeed, continue until your child is 6 to 12 months of age.  To apply ear drops:  Put the bottle in warm water if the medicine is kept in the refrigerator. Cold drops in the ear are uncomfortable.  Have your child lie down on a flat surface. Gently hold your child s head to one side.  Remove any drainage from the ear with a clean tissue or cotton swab. Clean only the outer ear. Don t put the cotton swab into the ear canal.  Straighten the ear canal by gently pulling the earlobe up and back.  Keep the dropper a half-inch above the ear canal. This will keep the dropper from becoming contaminated. Put the drops against the side of the ear canal.  Have your child stay lying down for 2 to 3 minutes. This gives time for the medicine to enter the ear canal. If your child doesn t have pain, gently massage the outer ear near the opening.  Wipe any extra medicine away from the outer ear with a clean cotton ball.    Follow-up care  Follow up with your child s healthcare provider as directed. Your child will need to have the ear rechecked to make sure the infection has gone away. Check with the healthcare provider to see when they want to see your child.   Special note to parents  If your child continues to get earaches, he or she may need ear tubes. The provider will put small tubes in your child s eardrum to help keep fluid from building up. This procedure is a  simple and works well.   When to seek medical advice  Call your child's healthcare provider for any of the following:   Fever (see Fever and children, below)  New symptoms, especially swelling around the ear or weakness of face muscles  Severe pain  Infection seems to get worse, not better   Neck pain  Your child acts very sick or not himself or herself  Fever or pain don't improve with antibiotics after 48 hours  Fever and children  Use a digital thermometer to check your child s temperature. Don t use a mercury thermometer. There are different kinds and uses of digital thermometers. They include:   Rectal. For children younger than 3 years, a rectal temperature is the most accurate.  Forehead (temporal). This works for children age 3 months and older. If a child under 3 months old has signs of illness, this can be used for a first pass. The provider may want to confirm with a rectal temperature.  Ear (tympanic). Ear temperatures are accurate after 6 months of age, but not before.  Armpit (axillary). This is the least reliable but may be used for a first pass to check a child of any age with signs of illness. The provider may want to confirm with a rectal temperature.  Mouth (oral). Don t use a thermometer in your child s mouth until he or she is at least 4 years old.  Use the rectal thermometer with care. Follow the product maker s directions for correct use. Insert it gently. Label it and make sure it s not used in the mouth. It may pass on germs from the stool. If you don t feel OK using a rectal thermometer, ask the healthcare provider what type to use instead. When you talk with any healthcare provider about your child s fever, tell him or her which type you used.   Below are guidelines to know if your young child has a fever. Your child s healthcare provider may give you different numbers for your child. Follow your provider s specific instructions.   Fever readings for a baby under 3 months old:   First, ask  your child s healthcare provider how you should take the temperature.  Rectal or forehead: 100.4 F (38 C) or higher  Armpit: 99 F (37.2 C) or higher  Fever readings for a child age 3 months to 36 months (3 years):   Rectal, forehead, or ear: 102 F (38.9 C) or higher  Armpit: 101 F (38.3 C) or higher  Call the healthcare provider in these cases:   Repeated temperature of 104 F (40 C) or higher in a child of any age  Fever of 100.4  F (38  C) or higher in baby younger than 3 months  Fever that lasts more than 24 hours in a child under age 2  Fever that lasts for 3 days in a child age 2 or older     [employer] last reviewed this educational content on 4/1/2020 2000-2022 [employer]. All rights reserved. This information is not intended as a substitute for professional medical care. Always follow your healthcare professional's instructions.

## 2023-05-18 NOTE — PROGRESS NOTES
Chief Complaint   Patient presents with     Otalgia     Left ear pain beginning yesterday         ICD-10-CM    1. Non-recurrent acute suppurative otitis media of left ear without spontaneous rupture of tympanic membrane  H66.002 amoxicillin (AMOXIL) 500 MG capsule      Antibiotics as prescribed.  Ibuprofen as needed for pain.  Recheck in 10 days if any symptoms remain.    Subjective     Berhane Murray is an 47 year old female who presents to clinic today for pain in the left ear that started yesterday.  Yesterday she just felt a fullness in the ear but today it has actually become quite painful.  She has been having a runny nose due to seasonal allergies.    ROS: 10 point ROS neg other than the symptoms noted above in the HPI.       Objective    /83 (BP Location: Right arm, Patient Position: Sitting, Cuff Size: Adult Large)   Pulse 88   Temp 99.4  F (37.4  C) (Tympanic)   Resp 16   Wt 98.2 kg (216 lb 6.4 oz)   LMP  (LMP Unknown)   SpO2 99%   BMI 37.73 kg/m    Nurses notes and VS have been reviewed.    Physical Exam       GENERAL APPEARANCE: alert and moderate distress     EYES: PERRL, EOMI, sclera non-icteric     HENT: nose and mouth without ulcers or lesions and pharynx appears normal, right tympanic membrane is opaque, left tympanic membrane is opaque, pink and without light reflex     NECK: no adenopathy or asymmetry, thyroid normal to palpation     RESP: lungs clear to auscultation - no rales, rhonchi or wheezes     CV: regular rates and rhythm, no murmurs, rubs, or gallop     SKIN: no suspicious lesions or rashes      ABNER Vu, CNP  [facility] Provider    The use of [employer] dictation services may have been used to construct the content in this note; any grammatical or spelling errors are non-intentional. Please contact the author of this note directly if you are in need of any clarification.

## 2023-05-22 ENCOUNTER — PATIENT OUTREACH (OUTPATIENT)
Dept: FAMILY MEDICINE | Facility: CLINIC | Age: 48
End: 2023-05-22
Payer: COMMERCIAL

## 2023-05-22 DIAGNOSIS — R87.810 CERVICAL HIGH RISK HPV (HUMAN PAPILLOMAVIRUS) TEST POSITIVE: Primary | ICD-10-CM

## 2023-05-22 LAB
HUMAN PAPILLOMA VIRUS 16 DNA: NEGATIVE
HUMAN PAPILLOMA VIRUS 18 DNA: NEGATIVE
HUMAN PAPILLOMA VIRUS FINAL DIAGNOSIS: ABNORMAL
HUMAN PAPILLOMA VIRUS OTHER HR: POSITIVE

## 2023-06-06 ENCOUNTER — OFFICE VISIT (OUTPATIENT)
Dept: FAMILY MEDICINE | Facility: CLINIC | Age: 48
End: 2023-06-06
Payer: COMMERCIAL

## 2023-06-06 VITALS
TEMPERATURE: 97.5 F | WEIGHT: 215.5 LBS | SYSTOLIC BLOOD PRESSURE: 116 MMHG | RESPIRATION RATE: 18 BRPM | DIASTOLIC BLOOD PRESSURE: 76 MMHG | BODY MASS INDEX: 36.79 KG/M2 | HEART RATE: 77 BPM | HEIGHT: 64 IN | OXYGEN SATURATION: 98 %

## 2023-06-06 DIAGNOSIS — R87.810 CERVICAL HIGH RISK HPV (HUMAN PAPILLOMAVIRUS) TEST POSITIVE: Primary | ICD-10-CM

## 2023-06-06 PROBLEM — E66.01 MORBID OBESITY (H): Chronic | Status: RESOLVED | Noted: 2021-04-28 | Resolved: 2023-06-06

## 2023-06-06 LAB — HCG UR QL: NEGATIVE

## 2023-06-06 PROCEDURE — 57456 ENDOCERV CURETTAGE W/SCOPE: CPT | Performed by: FAMILY MEDICINE

## 2023-06-06 PROCEDURE — 88305 TISSUE EXAM BY PATHOLOGIST: CPT | Performed by: PATHOLOGY

## 2023-06-06 PROCEDURE — 81025 URINE PREGNANCY TEST: CPT | Performed by: FAMILY MEDICINE

## 2023-06-06 ASSESSMENT — PAIN SCALES - GENERAL: PAINLEVEL: NO PAIN (0)

## 2023-06-06 NOTE — PROGRESS NOTES
Berhane Murray is a 47 year old female who presents for initial colposcopy, referred by me . Pap smear 1 months ago showed: normal with high risk HPV  The prior pap showed normal and high risk HPV in April of 2021     Past Medical History:   Diagnosis Date     Cervical high risk HPV (human papillomavirus) test positive 08/04/2017 08/04/17, 04/28/21     Family History   Problem Relation Age of Onset     Breast Cancer Mother        Previous history of abnormal paps?: No  History of cryotherapy (freezing)?: : No  History of veneral diseases: : No  Do you desire testing for any of these diseases? : No  History of genital warts:  No  Visible warts now?:  No  Previously treated? If so, how?:  No     No LMP recorded (lmp unknown). Patient is premenopausal.  Type of contraception: post menopausal status    History   Smoking Status     Never   Smokeless Tobacco     Never       Allergies as of 06/06/2023 - Reviewed 06/06/2023   Allergen Reaction Noted     Prednisone  08/04/2017        PROCEDURE:  Before the procedure, it was ensured that the patient was educated regarding the nature of her findings to date, the implications of them, and what was to be done. She has been made aware of the role of HPV, the natural history of infection, ways to minimize her future risk, the effect of HPV on the cervix, and treatment options available should they be indicated. The   pathophysiology of the cervix, including a discussion of squamous vs. endometrial cells, and squamous metaplasia have all been reviewed, using illustrations and sketches. The details of the colposcopic procedure were reviewed, as well as the risks of missed diagnoses, pain, infection and bleeding. All questions were answered before proceeding, and informed consent was therefore obtained.    Bimanual examination: was performed and was unremarkable.  Unenhanced examination of the cervix was normal without lesions.  Pap smear and endocervical sampling not obtained due  to:    not due  Please refer to images section for details!  Pap repeated?:  No  SCJ seen?:  no  Endocervical speculum needed?:  Yes   ECC done?:  Yes   Lugol's solution used?:  Yes   Satisfactory examination?:  no    Vaginal vault: normal to cursory inspection   Urethra normal?:  yes  Labia normal?:  yes  Perineum normal?:  yes  Rectum normal?:  yes    FINDINGS:  Please see image   Cervix: no visible lesions  Procedure: biopsies taken (not including ECC): 0.    Procedure summary: Patient tolerated procedure well     Assessment: HPV related changes    Plan: Specimens labelled and sent to pathology., Will base further treatment on pathology findings. and post biopsy instructions given to patient

## 2023-06-13 LAB
PATH REPORT.COMMENTS IMP SPEC: NORMAL
PATH REPORT.COMMENTS IMP SPEC: NORMAL
PATH REPORT.FINAL DX SPEC: NORMAL
PATH REPORT.GROSS SPEC: NORMAL
PATH REPORT.MICROSCOPIC SPEC OTHER STN: NORMAL
PATH REPORT.RELEVANT HX SPEC: NORMAL
PHOTO IMAGE: NORMAL

## 2023-06-16 ENCOUNTER — PATIENT OUTREACH (OUTPATIENT)
Dept: FAMILY MEDICINE | Facility: CLINIC | Age: 48
End: 2023-06-16
Payer: COMMERCIAL

## 2023-06-16 DIAGNOSIS — R87.810 CERVICAL HIGH RISK HPV (HUMAN PAPILLOMAVIRUS) TEST POSITIVE: Primary | ICD-10-CM

## 2023-07-15 ENCOUNTER — TRANSFERRED RECORDS (OUTPATIENT)
Dept: MULTI SPECIALTY CLINIC | Facility: CLINIC | Age: 48
End: 2023-07-15

## 2023-07-15 LAB — RETINOPATHY: NORMAL

## 2023-09-16 ENCOUNTER — HEALTH MAINTENANCE LETTER (OUTPATIENT)
Age: 48
End: 2023-09-16

## 2024-02-03 ENCOUNTER — HEALTH MAINTENANCE LETTER (OUTPATIENT)
Age: 49
End: 2024-02-03

## 2024-05-06 ENCOUNTER — TELEPHONE (OUTPATIENT)
Dept: FAMILY MEDICINE | Facility: CLINIC | Age: 49
End: 2024-05-06

## 2024-05-06 ENCOUNTER — NURSE TRIAGE (OUTPATIENT)
Dept: FAMILY MEDICINE | Facility: CLINIC | Age: 49
End: 2024-05-06

## 2024-05-06 NOTE — TELEPHONE ENCOUNTER
Reason for Call:  Appointment Request    Patient requesting this type of appt: Yearly and follow up on bleeding     Requested provider: Shabana Toribio    Reason patient unable to be scheduled: Not within requested timeframe    When does patient want to be seen/preferred time: 3-7 days    Comments: She would like to see her dr sooner for her physical having some isssues going on     Could we send this information to you in [facility] or would you prefer to receive a phone call?:   No preference   Okay to leave a detailed message?: Yes at Cell number on file:    Telephone Information:   Mobile [phone]       Call taken on 5/6/2024 at 7:57 AM by Violette Nagy

## 2024-05-06 NOTE — TELEPHONE ENCOUNTER
Patient calling,    Patient would like sooner appointment to address HPV risk and discharge.  Patient rescheduled for appointment tomorrow 5/7.    Nurse Triage SBAR    Is this a 2nd Level Triage? NO    Situation: Patient calling for sooner appointment for vaginal discharge, concerned for HPV.    Background: Patient is high risk for HPV. Had colscopic procedure 6/23.  Patient had mild brownish discharge on Thursday 5/2 and blood yesterday evening.    Assessment:   Brown discharge x1, mild and noticed on wipe 5/2  Blood discharge x1, mild and noticed on wipe 5/5  No current discharge  Some cramps, described as similar to period cramps    Protocol Recommended Disposition:   See in Office Within 3 Days    Recommendation: See in Office in 3 days.  Patient requesting appointment to sooner date.  Patient scheduled for appointment tomorrow morning.  Patient advised to call back with any new or worsening symptoms.    Singh ESPINOZA RN        Reason for Disposition   Patient wants to be seen    Additional Information   Negative: Sounds like a life-threatening emergency to the triager   Negative: Followed a genital area injury (e.g., vagina, vulva)   Negative: Vaginal bleeding is main symptom   Negative: Vaginal discharge is main symptom   Negative: Pain or burning with passing urine (urination) is main symptom   Negative: Menstrual cramps is main symptom   Negative: Abdomen pain is main symptom   Negative: Pubic lice suspected   Negative: Itching or rash of female genital area (e.g., labia, vagina, vulva)   Negative: Pregnant and labor suspected   Negative: Patient sounds very sick or weak to the triager   Negative: SEVERE pain and not improved 2 hours after pain medicine   Negative: Genital area looks infected (e.g., draining sore, spreading redness) and fever   Negative: Something is hanging out of the vagina and can't easily be pushed back inside   Negative: MILD-MODERATE pain and present > 24 hours  (Exception: Chronic pain.)    "Negative: Genital area looks infected (e.g., draining sore, spreading redness)   Negative: Rash with painful tiny water blisters   Negative: MODERATE-SEVERE itching (i.e., interferes with school, work, or sleep)   Negative: Rash (e.g., redness, tiny bumps, sore) of genital area and present > 24 hours   Negative: Tender lump (swelling or \"ball\") at vaginal opening   Negative: Symptoms of a yeast infection (i.e., itchy, white discharge, not bad smelling) and not improved > 3 days following Care Advice   Negative: Vaginal itching and not improved > 3 days following Care Advice   Negative: Vaginal odor (bad smell) not improved > 3 days following Care Advice   Negative: Patient is worried they have a sexually transmitted infection (STI)    Protocols used: Vaginal Symptoms-A-OH    "

## 2024-05-07 ENCOUNTER — OFFICE VISIT (OUTPATIENT)
Dept: FAMILY MEDICINE | Facility: CLINIC | Age: 49
End: 2024-05-07
Payer: COMMERCIAL

## 2024-05-07 VITALS
TEMPERATURE: 98 F | HEART RATE: 76 BPM | BODY MASS INDEX: 37.4 KG/M2 | OXYGEN SATURATION: 98 % | DIASTOLIC BLOOD PRESSURE: 87 MMHG | RESPIRATION RATE: 16 BRPM | WEIGHT: 211.1 LBS | SYSTOLIC BLOOD PRESSURE: 129 MMHG | HEIGHT: 63 IN

## 2024-05-07 DIAGNOSIS — E04.9 ENLARGED THYROID: ICD-10-CM

## 2024-05-07 DIAGNOSIS — N93.8 DUB (DYSFUNCTIONAL UTERINE BLEEDING): Primary | ICD-10-CM

## 2024-05-07 DIAGNOSIS — E66.01 CLASS 2 SEVERE OBESITY DUE TO EXCESS CALORIES WITH SERIOUS COMORBIDITY AND BODY MASS INDEX (BMI) OF 37.0 TO 37.9 IN ADULT (H): ICD-10-CM

## 2024-05-07 DIAGNOSIS — B97.7 HIGH RISK HPV INFECTION: ICD-10-CM

## 2024-05-07 DIAGNOSIS — E66.812 CLASS 2 SEVERE OBESITY DUE TO EXCESS CALORIES WITH SERIOUS COMORBIDITY AND BODY MASS INDEX (BMI) OF 37.0 TO 37.9 IN ADULT (H): ICD-10-CM

## 2024-05-07 DIAGNOSIS — R73.01 ELEVATED FASTING GLUCOSE: ICD-10-CM

## 2024-05-07 DIAGNOSIS — R06.83 SNORING: ICD-10-CM

## 2024-05-07 DIAGNOSIS — Z12.4 CERVICAL CANCER SCREENING: ICD-10-CM

## 2024-05-07 LAB
ANION GAP SERPL CALCULATED.3IONS-SCNC: 9 MMOL/L (ref 7–15)
BUN SERPL-MCNC: 11.3 MG/DL (ref 6–20)
CALCIUM SERPL-MCNC: 9.4 MG/DL (ref 8.6–10)
CHLORIDE SERPL-SCNC: 106 MMOL/L (ref 98–107)
CHOLEST SERPL-MCNC: 194 MG/DL
CREAT SERPL-MCNC: 0.86 MG/DL (ref 0.51–0.95)
CREAT UR-MCNC: 119 MG/DL
DEPRECATED HCO3 PLAS-SCNC: 27 MMOL/L (ref 22–29)
EGFRCR SERPLBLD CKD-EPI 2021: 82 ML/MIN/1.73M2
FASTING STATUS PATIENT QL REPORTED: YES
FSH SERPL IRP2-ACNC: 55.4 MIU/ML
GLUCOSE SERPL-MCNC: 91 MG/DL (ref 70–99)
HBA1C MFR BLD: 5.8 % (ref 0–5.6)
HDLC SERPL-MCNC: 41 MG/DL
LDLC SERPL CALC-MCNC: 137 MG/DL
MICROALBUMIN UR-MCNC: <12 MG/L
MICROALBUMIN/CREAT UR: NORMAL MG/G{CREAT}
NONHDLC SERPL-MCNC: 153 MG/DL
POTASSIUM SERPL-SCNC: 3.9 MMOL/L (ref 3.4–5.3)
SODIUM SERPL-SCNC: 142 MMOL/L (ref 135–145)
TRIGL SERPL-MCNC: 80 MG/DL
TSH SERPL DL<=0.005 MIU/L-ACNC: 0.78 UIU/ML (ref 0.3–4.2)

## 2024-05-07 PROCEDURE — 87624 HPV HI-RISK TYP POOLED RSLT: CPT | Performed by: FAMILY MEDICINE

## 2024-05-07 PROCEDURE — 84443 ASSAY THYROID STIM HORMONE: CPT | Performed by: FAMILY MEDICINE

## 2024-05-07 PROCEDURE — 82570 ASSAY OF URINE CREATININE: CPT | Performed by: FAMILY MEDICINE

## 2024-05-07 PROCEDURE — G0145 SCR C/V CYTO,THINLAYER,RESCR: HCPCS | Performed by: FAMILY MEDICINE

## 2024-05-07 PROCEDURE — 83001 ASSAY OF GONADOTROPIN (FSH): CPT | Performed by: FAMILY MEDICINE

## 2024-05-07 PROCEDURE — 80048 BASIC METABOLIC PNL TOTAL CA: CPT | Performed by: FAMILY MEDICINE

## 2024-05-07 PROCEDURE — 83036 HEMOGLOBIN GLYCOSYLATED A1C: CPT | Performed by: FAMILY MEDICINE

## 2024-05-07 PROCEDURE — 99214 OFFICE O/P EST MOD 30 MIN: CPT | Performed by: FAMILY MEDICINE

## 2024-05-07 PROCEDURE — 80061 LIPID PANEL: CPT | Performed by: FAMILY MEDICINE

## 2024-05-07 PROCEDURE — 36415 COLL VENOUS BLD VENIPUNCTURE: CPT | Performed by: FAMILY MEDICINE

## 2024-05-07 PROCEDURE — 82043 UR ALBUMIN QUANTITATIVE: CPT | Performed by: FAMILY MEDICINE

## 2024-05-07 ASSESSMENT — ASTHMA QUESTIONNAIRES
ACT_TOTALSCORE: 24
QUESTION_5 LAST FOUR WEEKS HOW WOULD YOU RATE YOUR ASTHMA CONTROL: WELL CONTROLLED
QUESTION_1 LAST FOUR WEEKS HOW MUCH OF THE TIME DID YOUR ASTHMA KEEP YOU FROM GETTING AS MUCH DONE AT WORK, SCHOOL OR AT HOME: NONE OF THE TIME
ACT_TOTALSCORE: 24
QUESTION_4 LAST FOUR WEEKS HOW OFTEN HAVE YOU USED YOUR RESCUE INHALER OR NEBULIZER MEDICATION (SUCH AS ALBUTEROL): NOT AT ALL
QUESTION_3 LAST FOUR WEEKS HOW OFTEN DID YOUR ASTHMA SYMPTOMS (WHEEZING, COUGHING, SHORTNESS OF BREATH, CHEST TIGHTNESS OR PAIN) WAKE YOU UP AT NIGHT OR EARLIER THAN USUAL IN THE MORNING: NOT AT ALL
QUESTION_2 LAST FOUR WEEKS HOW OFTEN HAVE YOU HAD SHORTNESS OF BREATH: NOT AT ALL

## 2024-05-07 ASSESSMENT — PAIN SCALES - GENERAL: PAINLEVEL: NO PAIN (0)

## 2024-05-07 NOTE — PROGRESS NOTES
"  Assessment & Plan     Cervical cancer screening  Done as she was due   - Pap Screen with HPV - recommended age 30 - 65 years  - HPV Hold (Lab Only)  - HPV High Risk Types DNA Cervical    High risk HPV infection  As above   - Pap Screen with HPV - recommended age 30 - 65 years  - Ob/Gyn  Referral; Future  - HPV Hold (Lab Only)  - HPV High Risk Types DNA Cervical    Class 2 severe obesity due to excess calories with serious comorbidity and body mass index (BMI) of 37.0 to 37.9 in adult (H)  Will check lipids   - Lipid panel reflex to direct LDL Non-fasting; Future  - Lipid panel reflex to direct LDL Non-fasting    DUB (dysfunctional uterine bleeding)  Irregular and heavy vag bleeding , will check labs also discussed seeing an OBGYN - referral placed   Will do a pelvic US as well   - Follicle stimulating hormone; Future  - TSH with free T4 reflex; Future  - Follicle stimulating hormone  - TSH with free T4 reflex  - Ob/Gyn  Referral; Future  - US Pelvic Complete with Transvaginal; Future    Elevated fasting glucose  Will check labs today   Hgb A1 c isidro down o 5.8 , was 5.9 last year   - BASIC METABOLIC PANEL; Future  - Lipid panel reflex to direct LDL Non-fasting; Future  - HEMOGLOBIN A1C  - Albumin Random Urine Quantitative with Creat Ratio  - BASIC METABOLIC PANEL    Snoring  We discussed and I gave her a sleep study referral   - Adult Sleep Eval & Management  Referral; Future    Enlarged thyroid  Will check with an US he thyroid gland   - US Thyroid; Future          BMI  Estimated body mass index is 37.39 kg/m  as calculated from the following:    Height as of this encounter: 1.6 m (5' 3\").    Weight as of this encounter: 95.8 kg (211 lb 1.6 oz).         rotator cuff  Pt is aware  and comfortable with the current plan.      Meg Last is a 48 year old, presenting for the following health issues:  Vaginal Bleeding      5/7/2024    10:28 AM   Additional Questions   Roomed by Malathi " P   Accompanied by n/a     Via the Health Maintenance questionnaire, the patient has reported the following services have been completed -Eye Exam, this information has been sent to the abstraction team.  History of Present Illness       Diabetes:   She presents for follow up of diabetes.    She is not checking blood glucose.        She is concerned about other.   She is having numbness in feet and blurry vision.  The patient has not had a diabetic eye exam in the last 12 months.          Reason for visit:  Bleeding and I believe I am post menopausal vaginal bleeding    She eats 2-3 servings of fruits and vegetables daily.She consumes 1 sweetened beverage(s) daily.She exercises with enough effort to increase her heart rate 10 to 19 minutes per day.  She exercises with enough effort to increase her heart rate 3 or less days per week.   She is taking medications regularly.         Menstrual Concern- this week last Thuraday brownish discharge , next day still there on Sunday red blood like period , q morning not during the day it is brown   LMP no period in four and half in 2020 thought to be in menopause , 2018   Onset/Duration: Since Sunday  Description:   Duration of bleeding episodes: 1 days  Frequency of periods: (1st day of one to 1st day of next):  every 1 days  Describe bleeding/flow:   Clots: No  Number of pads/day: 0        Cramping: mild  Accompanying Signs & Symptoms:  Lightheadedness: No  Temperature intolerance: No  Nosebleeds/Easy bruising: No  Vaginal Discharge: YES  Acne: No  Change in body hair: YES  History:  No LMP recorded (lmp unknown). Patient is postmenopausal.  Previous normal periods: YES  Contraceptive use: NO  Sexually active: No  Any bleeding after intercourse: No  Abnormal PAP Smears: YES  Precipitating or alleviating factors: None  Therapies tried and outcome: None        Review of Systems  Constitutional, HEENT, cardiovascular, pulmonary, GI, , musculoskeletal, neuro, skin, endocrine  and psych systems are negative, except as otherwise noted.      Objective    LMP  (LMP Unknown)   There is no height or weight on file to calculate BMI.  Physical Exam   GENERAL: alert and no distress  EYES: Eyes grossly normal to inspection, PERRL and conjunctivae and sclerae normal  NECK: no adenopathy, no asymmetry, masses, or scars  RESP: lungs clear to auscultation - no rales, rhonchi or wheezes  CV: regular rate and rhythm, normal S1 S2, no S3 or S4, no murmur, click or rub, no peripheral edema  ABDOMEN: soft, nontender, no hepatosplenomegaly, no masses and bowel sounds normal   (female) w/bimanual: normal female external genitalia, normal urethral meatus, normal vaginal mucosa, normal cervix/adnexa/uterus without masses or discharge  MS: no gross musculoskeletal defects noted, no edema  NEURO: Normal strength and tone, mentation intact and speech normal  PSYCH: mentation appears normal, affect normal/bright    Results for orders placed or performed in visit on 05/07/24   HEMOGLOBIN A1C     Status: Abnormal   Result Value Ref Range    Hemoglobin A1C 5.8 (H) 0.0 - 5.6 %   Albumin Random Urine Quantitative with Creat Ratio     Status: None   Result Value Ref Range    Creatinine Urine mg/dL 119.0 mg/dL    Albumin Urine mg/L <12.0 mg/L    Albumin Urine mg/g Cr     BASIC METABOLIC PANEL     Status: Normal   Result Value Ref Range    Sodium 142 135 - 145 mmol/L    Potassium 3.9 3.4 - 5.3 mmol/L    Chloride 106 98 - 107 mmol/L    Carbon Dioxide (CO2) 27 22 - 29 mmol/L    Anion Gap 9 7 - 15 mmol/L    Urea Nitrogen 11.3 6.0 - 20.0 mg/dL    Creatinine 0.86 0.51 - 0.95 mg/dL    GFR Estimate 82 >60 mL/min/1.73m2    Calcium 9.4 8.6 - 10.0 mg/dL    Glucose 91 70 - 99 mg/dL   Lipid panel reflex to direct LDL Non-fasting     Status: Abnormal   Result Value Ref Range    Cholesterol 194 <200 mg/dL    Triglycerides 80 <150 mg/dL    Direct Measure HDL 41 (L) >=50 mg/dL    LDL Cholesterol Calculated 137 (H) <=100 mg/dL    Non  HDL Cholesterol 153 (H) <130 mg/dL    Patient Fasting > 8hrs? Yes     Narrative    Cholesterol  Desirable:  <200 mg/dL    Triglycerides  Normal:  Less than 150 mg/dL  Borderline High:  150-199 mg/dL  High:  200-499 mg/dL  Very High:  Greater than or equal to 500 mg/dL    Direct Measure HDL  Female:  Greater than or equal to 50 mg/dL   Male:  Greater than or equal to 40 mg/dL    LDL Cholesterol  Desirable:  <100mg/dL  Above Desirable:  100-129 mg/dL   Borderline High:  130-159 mg/dL   High:  160-189 mg/dL   Very High:  >= 190 mg/dL    Non HDL Cholesterol  Desirable:  130 mg/dL  Above Desirable:  130-159 mg/dL  Borderline High:  160-189 mg/dL  High:  190-219 mg/dL  Very High:  Greater than or equal to 220 mg/dL   Follicle stimulating hormone     Status: None   Result Value Ref Range    FSH 55.4 mIU/mL   TSH with free T4 reflex     Status: Normal   Result Value Ref Range    TSH 0.78 0.30 - 4.20 uIU/mL   HPV High Risk Types DNA Cervical     Status: Abnormal   Result Value Ref Range    Other HR HPV Positive (A) Negative    HPV16 DNA Negative Negative    HPV18 DNA Negative Negative    FINAL DIAGNOSIS       This patient's sample is positive for other HR HPV DNA (types 31, 33, 35, 39, 45, 51, 52, 56, 58, 59, 66 or 68), not HPV 16 or HPV 18 DNA. This result requires clinical correlation with concurrent cytology findings.      This test was developed and its performance characteristics determined by the [facility]. It has not been cleared or approved by the FDA. The laboratory is regulated under CLIA as qualified to perform high-complexity testing. This test is used for clinical purposes. It should not be regarded as investigational or for research.    METHODOLOGY: The Roche Adelaide 4800 system uses automated extraction, simultaneous amplification of HPV (L1 region) and beta-globin, followed by real time detection of fluorescent labeled HPV and beta globin using  specific oligonucleotide probes. The test specifically identifies types HPV 16 DNA and HPV 18 DNA while concurrently detecting the rest of the high risk types (31, 33, 35, 39, 45, 51, 52, 56, 58, 59, 66 or 68).    COMMENTS: This test is not intended for use as a screening device for woman under age 30 with normal cervical cytology. Results should be correlated with cytologic and histologic findings. Close clinical followup is recommended.       Pap Screen with HPV - recommended age 30 - 65 years     Status: None   Result Value Ref Range    Interpretation        Negative for Intraepithelial Lesion or Malignancy (NILM)    Comment         Papanicolaou Test Limitations:  Cervical cytology is a screening test with limited sensitivity, and regular screening is critical for cancer prevention.  Pap tests are primarily effective for the diagnosis/prevention of squamous cell carcinoma, not adenocarcinoma or other cancers.        Specimen Adequacy       Satisfactory for evaluation, endocervical/transformation zone component absent    Clinical Information       none      Reflex Testing Yes regardless of result     Previous Abnormal?       No      Performing Labs       The technical component of this testing was completed at [facility].    Stain controls for all stains resulted within this report have been reviewed and show appropriate reactivity.             Signed Electronically by: Shabana Toribio MD

## 2024-05-08 ENCOUNTER — TELEPHONE (OUTPATIENT)
Dept: GASTROENTEROLOGY | Facility: CLINIC | Age: 49
End: 2024-05-08

## 2024-05-08 ENCOUNTER — ANCILLARY PROCEDURE (OUTPATIENT)
Dept: ULTRASOUND IMAGING | Facility: CLINIC | Age: 49
End: 2024-05-08
Attending: FAMILY MEDICINE
Payer: COMMERCIAL

## 2024-05-08 ENCOUNTER — HOSPITAL ENCOUNTER (OUTPATIENT)
Facility: AMBULATORY SURGERY CENTER | Age: 49
End: 2024-05-08
Attending: SURGERY | Admitting: SURGERY
Payer: COMMERCIAL

## 2024-05-08 DIAGNOSIS — N93.8 DUB (DYSFUNCTIONAL UTERINE BLEEDING): ICD-10-CM

## 2024-05-08 DIAGNOSIS — Z12.11 ENCOUNTER FOR SCREENING COLONOSCOPY: Primary | ICD-10-CM

## 2024-05-08 PROCEDURE — 76856 US EXAM PELVIC COMPLETE: CPT | Mod: TC | Performed by: RADIOLOGY

## 2024-05-08 PROCEDURE — 76830 TRANSVAGINAL US NON-OB: CPT | Mod: TC | Performed by: RADIOLOGY

## 2024-05-08 RX ORDER — BISACODYL 5 MG/1
TABLET, DELAYED RELEASE ORAL
Qty: 4 TABLET | Refills: 0 | Status: SHIPPED | OUTPATIENT
Start: 2024-05-08 | End: 2024-07-19

## 2024-05-08 NOTE — TELEPHONE ENCOUNTER
"Endoscopy Scheduling Screen    Have you had a positive Covid test in the last 14 days?  No    What is your communication preference for Instructions and/or Bowel Prep?   MyChart    What insurance is in the chart?  Other:  BC[employer]    Ordering/Referring Provider:     Shabana Toribio MD in [facility]      (If ordering provider performs procedure, schedule with ordering provider unless otherwise instructed. )    BMI: Estimated body mass index is 37.39 kg/m  as calculated from the following:    Height as of 5/7/24: 1.6 m (5' 3\").    Weight as of 5/7/24: 95.8 kg (211 lb 1.6 oz).     Sedation Ordered  moderate sedation.   If patient BMI > 50 do not schedule in [facility].    If patient BMI > 45 do not schedule at [facility].    Are you taking methadone or Suboxone?  No    Have you had difficulties, pain, or discomfort during past endoscopy procedures?  No    Are you taking any prescription medications for pain 3 or more times per week?   NO, No RN review required.    Do you have a history of malignant hyperthermia?  No    (Females) Are you currently pregnant?   No     Have you been diagnosed or told you have pulmonary hypertension?   No    Do you have an LVAD?  No    Have you been told you have moderate to severe sleep apnea?  No    Have you been told you have COPD, asthma, or any other lung disease?  No    Do you have any heart conditions?  No     Have you ever had or are you waiting for an organ transplant?  No. Continue scheduling, no site restrictions.    Have you had a stroke or transient ischemic attack (TIA aka \"mini stroke\" in the last 6 months?   No    Have you been diagnosed with or been told you have cirrhosis of the liver?   No    Are you currently on dialysis?   No    Do you need assistance transferring?   No    BMI: Estimated body mass index is 37.39 kg/m  as calculated from the following:    Height as of 5/7/24: 1.6 m (5' 3\").    Weight as of 5/7/24: 95.8 kg (211 lb 1.6 oz).     Is patients BMI > 40 and scheduling " location [facility]?  No    Do you take an injectable medication for weight loss or diabetes (excluding insulin)?  No    Do you take the medication Naltrexone?  No    Do you take blood thinners?  No       Prep   Are you currently on dialysis or do you have chronic kidney disease?  No    Do you have a diagnosis of diabetes?  No    Do you have a diagnosis of cystic fibrosis (CF)?  No    On a regular basis do you go 3 -5 days between bowel movements?  No    BMI > 40?  No    Preferred Pharmacy:    [id number]  [place], [place] - [address] AT [facility]  [address] [place] [address]  Phone: [phone] Fax: [phone]      Final Scheduling Details     Procedure scheduled  Colonoscopy    Surgeon:  Gomez     Date of procedure:  05/17/2024     Pre-OP / PAC:   No - Not required for this site.    Location  [facility] - [facility] - Patient preference.    Sedation   Moderate Sedation - Per order.      Patient Reminders:   You will receive a call from a Nurse to review instructions and health history.  This assessment must be completed prior to your procedure.  Failure to complete the Nurse assessment may result in the procedure being cancelled.      On the day of your procedure, please designate an adult(s) who can drive you home stay with you for the next 24 hours. The medicines used in the exam will make you sleepy. You will not be able to drive.      You cannot take public transportation, ride share services, or non-medical taxi service without a responsible caregiver.  Medical transport services are allowed with the requirement that a responsible caregiver will receive you at your destination.  We require that drivers and caregivers are confirmed prior to your procedure.

## 2024-05-08 NOTE — TELEPHONE ENCOUNTER
Pre visit planning completed.      Procedure details:    Patient scheduled for Colonoscopy  on 5/17/24.     Arrival time: 0815. Procedure time 0900    Facility location: [facility]; [address]nd Floor, [place] [address]. Check in location: 2nd Floor at Surgery desk.    Sedation type: Conscious sedation     Pre op exam needed? N/A    Indication for procedure: Screening      Chart review:     Electronic implanted devices? No    Recent diagnosis of diverticulitis within the last 6 weeks? No    Diabetic? Yes. Not on diabetic medication -- confirm no medications.       Medication review:    Anticoagulants? No    NSAIDS? No NSAID medications per patient's medication list.  RN will verify with pre-assessment call.    Other medication HOLDING recommendations:  N/A      Prep for procedure:     Bowel prep recommendation: Standard Golytely. Bowel prep prescription sent to      [id number] - [place], [place] - [address] AT [facility]  [address]  Due to: diabetes.  Pt answered NO to diabetes, however it is listed on her problems list. A1C did hit threshold of 6.5, 3 years ago. Will stick with Golytely.     Prep instructions sent via [employer]         Jazmín Rivera RN  Endoscopy Procedure Pre Assessment RN  [phone] option 4

## 2024-05-08 NOTE — PATIENT INSTRUCTIONS
If you have any questions regarding your visit, Please contact your care team.     [facility] [facility]: [phone]    To Schedule an Appointment 24/7  Call: [phone] HOU[facility] TELEPHONE NUMBER     Sigifredo Mcnally MD  Medical Director        Samira-LUIZ Oscar-RN  Annita Lees-Surgery Scheduler  Christy-Surgery Scheduler               Tuesday-[place]  7:30 a.m-4:30 p.m    Thursday-[place]  7:30 a.m-4:30 p.m    Typical Surgery Days: Tuesday or Friday [facility]  [address] [place] [address]  PH: [phone]    Imaging Scheduling all locations  PH: [phone]     [facility]  [address] [place] [address] [place] [address]  PH: [phone]    [facility]  [address] [place] [address]  PH: [phone] Fax      **Surgeries** Our Surgery Schedulers will contact you to schedule. If you do not receive a call within 3 business days, please call [phone].    Urgent Care locations:  [facility] Monday-Friday  10 am - 8 pm  Saturday and Sunday   9 am - 5 pm  Monday-Friday   10 am - 8 pm  Saturday and Sunday   9 am - 5 pm   [phone]   If you need a medication refill, please contact your pharmacy. Please allow 3 business days for your refill to be completed.  As always, Thank you for trusting us with your healthcare needs!    see additional instructions from your care team below

## 2024-05-08 NOTE — TELEPHONE ENCOUNTER
Pre assessment completed for upcoming procedure.   (Please see previous telephone encounter notes for complete details)      Procedure details:    Arrival time and facility location reviewed.    Pre op exam needed? N/A    Designated  policy reviewed. Instructed to have someone stay 6  hours post procedure.       Medication review:    Medications reviewed. Please see supporting documentation below. Holding recommendations discussed (if applicable).     The Pt denies taking any medications for diabetes or blood sugar.       Prep for procedure:     Procedure prep instructions reviewed.        Any additional information needed:  N/A      Patient  verbalized understanding and had no questions or concerns at this time.      Jazmín Rivera RN  Endoscopy Procedure Pre Assessment RN  [phone] option 4

## 2024-05-13 RX ORDER — ONDANSETRON 2 MG/ML
4 INJECTION INTRAMUSCULAR; INTRAVENOUS
Status: CANCELLED | OUTPATIENT
Start: 2024-05-13

## 2024-05-13 RX ORDER — LIDOCAINE 40 MG/G
CREAM TOPICAL
Status: CANCELLED | OUTPATIENT
Start: 2024-05-13

## 2024-05-14 ENCOUNTER — ANCILLARY PROCEDURE (OUTPATIENT)
Dept: ULTRASOUND IMAGING | Facility: CLINIC | Age: 49
End: 2024-05-14
Attending: FAMILY MEDICINE
Payer: COMMERCIAL

## 2024-05-14 DIAGNOSIS — E04.9 ENLARGED THYROID: ICD-10-CM

## 2024-05-14 PROCEDURE — 76536 US EXAM OF HEAD AND NECK: CPT | Mod: TC | Performed by: STUDENT IN AN ORGANIZED HEALTH CARE EDUCATION/TRAINING PROGRAM

## 2024-05-15 ENCOUNTER — TELEPHONE (OUTPATIENT)
Dept: OBGYN | Facility: CLINIC | Age: 49
End: 2024-05-15

## 2024-05-15 ENCOUNTER — OFFICE VISIT (OUTPATIENT)
Dept: OBGYN | Facility: CLINIC | Age: 49
End: 2024-05-15
Attending: FAMILY MEDICINE
Payer: COMMERCIAL

## 2024-05-15 ENCOUNTER — TELEPHONE (OUTPATIENT)
Dept: GASTROENTEROLOGY | Facility: CLINIC | Age: 49
End: 2024-05-15

## 2024-05-15 VITALS
SYSTOLIC BLOOD PRESSURE: 110 MMHG | WEIGHT: 212 LBS | HEART RATE: 59 BPM | OXYGEN SATURATION: 96 % | DIASTOLIC BLOOD PRESSURE: 86 MMHG | BODY MASS INDEX: 37.55 KG/M2

## 2024-05-15 DIAGNOSIS — B97.7 HIGH RISK HPV INFECTION: ICD-10-CM

## 2024-05-15 DIAGNOSIS — N95.0 POST-MENOPAUSE BLEEDING: Primary | ICD-10-CM

## 2024-05-15 PROCEDURE — 99203 OFFICE O/P NEW LOW 30 MIN: CPT | Performed by: OBSTETRICS & GYNECOLOGY

## 2024-05-15 NOTE — TELEPHONE ENCOUNTER
Associated Diagnoses    Post-menopause bleeding [N95.0]  - Primary        Source Order Set    Order Set Name Order ID    [id number]     Case Request: Case Info    Panel 1    Providers    Provider Role Service   Sigifredo Mcnally MD Primary Gynecology     Procedures    Procedure Laterality Anesthesia Region   Diagnostic Hysteroscopy with Biopsy N/A MAC with Local Vagina                  Requested date:   Location:  OR   Patient class:      Pre-op diagnoses: Post-menopause bleeding     Scheduling Instructions    Additional Instructions for the Case  Procedure notes:    Procedure length:  30 min  Diagnosis:  Postmenopausal bleeding  Request additional equipment:    Location:  [facility]  Sterilization consent signed:  NA  OR staff assist:  no  H&P to be completed by PCP  Post op appointment needed:  no  Scheduling instructions:

## 2024-05-15 NOTE — PROGRESS NOTES
Berhane is a 48 year old No obstetric history on file.  is here today in referral for an episode of post-menopausal bleeding.  She hadn't had a cycle for >3 years.  The bleeding has pretty much stopped.      ROS: Pertinent ROS as above.    Gyn Hx:      Past Medical History:   Diagnosis Date    Cervical high risk HPV (human papillomavirus) test positive 08/04/2017 08/04/17, 04/28/21     No past surgical history on file.      ALL/Meds: Her medication and allergy histories were reviewed and are documented in their appropriate chart areas.    SH: Reviewed and documented in the appropriate area of the chart.  FH:  Her family history is reviewed and updated in the chart, today.  PMH: Her past medical, surgical, and obstetric histories were reviewed and updated today in the appropriate chart areas.    PE: LMP  (LMP Unknown)   There is no height or weight on file to calculate BMI.    Pertinent Physical exam findings:    General Appearance:  healthy, alert, active, no distress    I discussed the concerns related to post menopausal bleeding, including the association with endometrial hyperplasia and endometrial carcinoma.  We discussed the other potential causes for bleeding.  She has had a pelvic ultrasound.    I discussed the concerns related to a thicker endometrial strip and the increased association of an endometrial abnormality with a thickness of greater than or equal to 5 mm.  She appears to understand.  Reviewed  risks, benefits, and alternatives of Hysteroscopy including but not limited to bleeding, infection, uterine perforation with damage to other organs, and possible need to for Laparoscopy or Laparotomy.  Reviewed pre and post operative course. Patient to see her primary care provider for pre-op evaluation.  All questions answered.  She appears to understand and does agree to proceed.   The plan will be to proceed with Diagnostic Hysteroscopy with Biopsy    U/S:    Narrative & Impression        FINDINGS:      UTERUS: 10.9 x 4.9 x 6.0 cm. Normal in size and position with no  masses.     ENDOMETRIUM: 18 mm. Thickened echogenic endometrium.     Neither ovary is visualized, likely obscured by bowel gas. No  significant free fluid.                                                                      IMPRESSION:  1.  Thickened endometrium measuring up to 18 mm. Consider  gynecological consultation for endometrial sampling.         A/P:(N95.0) Post-menopause bleeding  (primary encounter diagnosis)  Comment: 30 minutes spent on the date of the encounter doing chart review, review of test results, patient visit, and documentation    Plan: Case Request: Diagnostic Hysteroscopy with         Biopsy                   - No orders of the defined types were placed in this encounter.

## 2024-05-15 NOTE — TELEPHONE ENCOUNTER
Caller: Berhane    Reason for Reschedule/Cancellation   (please be detailed, any staff messages or encounters to note?): Pt has other medical emergency to take care of       Prior to reschedule please review:  Ordering Provider: SANDRA BANKS   Sedation Determined: Moderate  Does patient have any ASC Exclusions, please identify?: No      Notes on Cancelled Procedure:  Procedure: Lower Endoscopy [Colonoscopy]   Date: 05/17/2024  Location: [facility] [place]; [address] [place] [address] [address], [place] [address]   Surgeon: YOVANI      Rescheduled: No, Pt needs updated referral and will call to reschedule once she is able to       Did you cancel or rescheduled an EUS procedure? No.

## 2024-05-16 NOTE — TELEPHONE ENCOUNTER
Spoke with patient this morning, she stated she was going to have the procedure completed with a different provider.  Will close this encounter.

## 2024-05-20 ENCOUNTER — TELEPHONE (OUTPATIENT)
Dept: OBGYN | Facility: CLINIC | Age: 49
End: 2024-05-20
Payer: COMMERCIAL

## 2024-05-20 ENCOUNTER — PATIENT OUTREACH (OUTPATIENT)
Dept: FAMILY MEDICINE | Facility: CLINIC | Age: 49
End: 2024-05-20
Payer: COMMERCIAL

## 2024-05-20 DIAGNOSIS — R87.810 CERVICAL HIGH RISK HPV (HUMAN PAPILLOMAVIRUS) TEST POSITIVE: Primary | ICD-10-CM

## 2024-06-06 ENCOUNTER — TELEPHONE (OUTPATIENT)
Dept: OBGYN | Facility: CLINIC | Age: 49
End: 2024-06-06
Payer: COMMERCIAL

## 2024-06-06 NOTE — CONFIDENTIAL NOTE
Spoke with patient to reschedule colpo.    Patient stated she was not aware why she was being referred to [facility] instead of her regular clinic in [facility]. Writer informed patient that I cannot schedule for the [facility] clinic, and she would need to discuss that with said clinic.   Patient declined, and opted to schedule with the [facility] clinic.

## 2024-06-06 NOTE — TELEPHONE ENCOUNTER
[facility] Call Center    Phone Message    May a detailed message be left on voicemail: yes     Reason for Call: Other: Patient is needing to reschedule colp. Please reach out. Thank you!     Action Taken: Other: [facility]    Travel Screening: Not Applicable     Date of Service:

## 2024-06-12 ENCOUNTER — OFFICE VISIT (OUTPATIENT)
Dept: SLEEP MEDICINE | Facility: CLINIC | Age: 49
End: 2024-06-12
Attending: FAMILY MEDICINE
Payer: COMMERCIAL

## 2024-06-12 VITALS
HEIGHT: 63 IN | SYSTOLIC BLOOD PRESSURE: 132 MMHG | WEIGHT: 218 LBS | BODY MASS INDEX: 38.62 KG/M2 | OXYGEN SATURATION: 97 % | HEART RATE: 74 BPM | DIASTOLIC BLOOD PRESSURE: 96 MMHG

## 2024-06-12 DIAGNOSIS — E66.9 OBESITY (BMI 30-39.9): ICD-10-CM

## 2024-06-12 DIAGNOSIS — E11.9 TYPE 2 DIABETES MELLITUS WITHOUT COMPLICATION, WITHOUT LONG-TERM CURRENT USE OF INSULIN (H): ICD-10-CM

## 2024-06-12 DIAGNOSIS — G47.00 INSOMNIA, UNSPECIFIED TYPE: ICD-10-CM

## 2024-06-12 DIAGNOSIS — G47.19 EXCESSIVE DAYTIME SLEEPINESS: ICD-10-CM

## 2024-06-12 DIAGNOSIS — R06.83 SNORING: ICD-10-CM

## 2024-06-12 DIAGNOSIS — H11.32 SUBCONJUNCTIVAL HEMORRHAGE OF LEFT EYE: ICD-10-CM

## 2024-06-12 DIAGNOSIS — R06.81 WITNESSED EPISODE OF APNEA: Primary | ICD-10-CM

## 2024-06-12 PROCEDURE — 99204 OFFICE O/P NEW MOD 45 MIN: CPT | Performed by: PHYSICIAN ASSISTANT

## 2024-06-12 ASSESSMENT — SLEEP AND FATIGUE QUESTIONNAIRES
HOW LIKELY ARE YOU TO NOD OFF OR FALL ASLEEP WHILE SITTING AND READING: HIGH CHANCE OF DOZING
HOW LIKELY ARE YOU TO NOD OFF OR FALL ASLEEP WHEN YOU ARE A PASSENGER IN A CAR FOR AN HOUR WITHOUT A BREAK: HIGH CHANCE OF DOZING
HOW LIKELY ARE YOU TO NOD OFF OR FALL ASLEEP WHILE SITTING QUIETLY AFTER LUNCH WITHOUT ALCOHOL: HIGH CHANCE OF DOZING
HOW LIKELY ARE YOU TO NOD OFF OR FALL ASLEEP IN A CAR, WHILE STOPPED FOR A FEW MINUTES IN TRAFFIC: MODERATE CHANCE OF DOZING
HOW LIKELY ARE YOU TO NOD OFF OR FALL ASLEEP WHILE SITTING INACTIVE IN A PUBLIC PLACE: HIGH CHANCE OF DOZING
HOW LIKELY ARE YOU TO NOD OFF OR FALL ASLEEP WHILE SITTING AND TALKING TO SOMEONE: SLIGHT CHANCE OF DOZING
HOW LIKELY ARE YOU TO NOD OFF OR FALL ASLEEP WHILE LYING DOWN TO REST IN THE AFTERNOON WHEN CIRCUMSTANCES PERMIT: HIGH CHANCE OF DOZING
HOW LIKELY ARE YOU TO NOD OFF OR FALL ASLEEP WHILE WATCHING TV: HIGH CHANCE OF DOZING

## 2024-06-12 NOTE — PROGRESS NOTES
Outpatient Sleep Medicine Consultation:    Name: Berhane Murray MRN# [id number]   Age: 48 year old YOB: 1975     Date of Consultation: June 12, 2024  Consultation is requested by: Shabana Toribio MD  [address] [facility] [place] [id number] Shabana Toribio  Primary care provider: Shabana Toribio       Reason for Sleep Consult:     Berhane Murray is sent by Shabana Toribio for a sleep consultation regarding 1. Snoring    Patient s Reason for visit  Berhane Murray main reason for visit: breathing snoring  Patient states problem(s) started: 4years  Berhane Murray's goals for this visit: answers suggestions         Assessment and Plan:     Summary Sleep Diagnoses:  1. Snoring  2. Witnessed episode of apnea  3. Excessive daytime sleepiness  4. Insomnia, unspecified type  Comorbid Diagnoses:  5. Obesity (BMI 30-39.9)  6. Type 2 diabetes mellitus without complication, without long-term current use of insulin (H)  7. Subconjunctival hemorrhage of left eye    Patient is being evaluated for Obstructive Sleep Apnea (MIKE).  Patient's risk factors for MIKE include: snoring, excessive daytime sleepiness (ESS 21), witnessed apneas, gasping/choking arousals, insomnia, enlarged neck girth (43 cm), obesity (BMI 38.62).  We discussed pathophysiology of MIKE and consequences of untreated moderate to severe sleep apnea such as a higher risk of hypertension, cardiovascular disease, cardiac arrhythmias, and stroke with worse outcomes after these events, as well as poor control of hypertension and diabetes. Patient is interested in treatment and willing to undergo overnight sleep testing. Discussed testing with overnight attended polysomnography versus home sleep apnea testing.  Patient has strong preference to pursue in lab evaluation, does not believe she will be able to appropriately connect home testing for accurate results.  Reassured her that we do teach how to connect things with a home test but she still has strong preference for  PSG which is reasonable assuming insurance covers it.  Orders were placed today for split-night polysomnogram evaluation.  If this is denied by insurance can switch to HST.  Assuming testing is positive as we suspect patient is open to starting on CPAP therapy and to expedite treatment set up I will plan to place empiric auto CPAP orders and send her a message on [employer] letting her know her results early.  Will plan on a follow-up visit a couple months after starting on CPAP to discuss compliance and treatment outcomes.  Of note, patient prefers to follow-up with one of my colleagues in [place] since much closer to home for her.    Patient additionally struggles with insomnia, underlying sleep apnea certainly contributing but also poor sleep hygiene, psychophysiological, tendency for delayed sleep phase all contributing.  She often enters bed early and will scroll on her phone until she becomes sleepy.  Discussed the importance of only entering bed when ready for sleep.  Discussed stopping use of electronics in the evening hours as well and switching to something like reading instead.  Insomnia can be revisited following treatment of MIKE.    Given her excessive daytime sleepiness we discussed the importance of only driving if fully alert and she voiced understanding.  She knows to pull over if she becomes drowsy behind the wheel.    Lastly, during visit patient had nontraumatic/idiopathic development of subconjunctival hemorrhage of her left eye, no pain or vision changes.  Reassured her that this should likely resolve on its own within a couple weeks but could certainly follow-up with urgent care/PCP if concern or any changes.          History of Present Illness:     Berhane Murray is a 48-year-old female with obesity, T2DM, allergic rhinitis, asthma who presents to clinic today for evaluation of snoring and concern of sleep apnea.    SLEEP-WAKE SCHEDULE:     Work/School Days: Patient goes to school/work: Yes -  "   Usually gets into bed at 10-10:30 PM but estimates she does not fall asleep until midnight  Admits will get into bed but is not sleepy, will scroll on her phone until she feels tired enough to sleep.  Has trouble falling asleep  4 nights per week  Wakes up in the middle of the night up to 5 times.  Wakes up due to Snorting self awake;Anxiety;Uncertain  It usually takes minutes to hour to get back to sleep  Patient is usually up at  6am  Uses alarm: Yes    Weekends/Non-work Days/All Other Days:  Usually gets into bed at midnight   Takes patient about  1 to fall asleep  Patient is usually up at 9am  Uses alarm: No    Sleep Need  Patient estimates she gets  5hrs sleep on average   Patient thinks she needs about 8 hours sleep    Berhane Murray prefers to sleep in this position(s): Back;Side     Naps  Patient takes a purposeful nap 0 times a week   She feels better after a nap: No  She dozes off unintentionally 7 days per week -with sedentary activities such as scrolling on her phone   Patient has had a driving accident or near-miss due to sleepiness/drowsiness: No      SLEEP DISRUPTIONS:    Breathing/Snoring  Patient snores:Yes for about 4 years   Other people complain about her snoring: Yes  Patient has been told she stops breathing in her sleep:Yes  Gasp/choking/coughing arousals: Yes   She has issues with the following: Morning headaches;Morning mouth dryness;Stuffy nose when you wake up;Heartburn or reflux at night    Movement:  Denies RLS symptoms today but reports can have some \"warm tingling\" sensation in her legs a couple times a week, attributes to her diabetes diagnosis, elevating legs with a pillow is helpful  Patient has been told she kicks her legs at night: No     Behaviors in Sleep:  Berhane Murray has experienced the following behaviors while sleeping: Teeth grinding - no guard   Denies sleepwalking, sleep talking, sleep eating, dream enactment, recurring nightmares, night terrors, sleep " paralysis, hypnagogic/hypnopompic hallucinations, cataplexy      CAFFEINE AND OTHER SUBSTANCES:    Patient consumes caffeinated beverages per day:  1  Last caffeine use is usually:  9am  List of any prescribed or over the counter stimulants that patient takes:  None   List of any prescribed or over the counter sleep medication patient takes:  None  List of previous sleep medications that patient has tried: None  Patient drinks alcohol to help them sleep: No  Patient drinks alcohol near bedtime: No    Family History:  Patient has a family member been diagnosed with a sleep disorder: No      SCALES:    EPWORTH SLEEPINESS SCALE         6/12/2024     2:42 PM    G[place] Sleepiness Scale ( KRISSY Tai  [id number]<br>ESS - [place]/English - Final version - 21 Nov 07 - [facility] [place].)   Sitting and reading High chance of dozing   Watching TV High chance of dozing   Sitting, inactive in a public place (e.g. a theatre or a meeting) High chance of dozing   As a passenger in a car for an hour without a break High chance of dozing   Lying down to rest in the afternoon when circumstances permit High chance of dozing   Sitting and talking to someone Slight chance of dozing   Sitting quietly after a lunch without alcohol High chance of dozing   In a car, while stopped for a few minutes in traffic Moderate chance of dozing   G[place]ala Score (MC) 21   G[place]ala Score (Sleep) 21     INSOMNIA SEVERITY INDEX (MIAN)          6/12/2024     2:32 PM   Insomnia Severity Index (MIAN)   Difficulty falling asleep 2   Difficulty staying asleep 2   Problems waking up too early 3   How SATISFIED/DISSATISFIED are you with your CURRENT sleep pattern? 4   How NOTICEABLE to others do you think your sleep problem is in terms of impairing the quality of your life? 4   How WORRIED/DISTRESSED are you about your current sleep problem? 3   To what extent do you consider your sleep problem to INTERFERE with your daily functioning (e.g. daytime fatigue, mood,  ability to function at work/daily chores, concentration, memory, mood, etc.) CURRENTLY? 4   MIAN Total Score 22       Guidelines for Scoring/Interpretation:  Total score categories:  0-7 = No clinically significant insomnia   8-14 = Subthreshold insomnia   15-21 = Clinical insomnia (moderate severity)  22-28 = Clinical insomnia (severe)  Used via courtesy of www.[facility].va.gov with permission from Zia Olmos PhD., [facility]      Allergies:    Allergies   Allergen Reactions    Prednisone      Fainted after competing full dose.        Medications:    Current Outpatient Medications   Medication Sig Dispense Refill    albuterol (PROAIR HFA/PROVENTIL HFA/VENTOLIN HFA) 108 (90 Base) MCG/ACT inhaler Inhale 2 puffs into the lungs every 6 hours (Patient not taking: Reported on 5/7/2024) 18 g 3    bisacodyl (DULCOLAX) 5 MG EC tablet Take 2 tablets at 3 pm the day before your procedure. If your procedure is before 11 am, take 2 additional tablets at 11 pm. If your procedure is after 11 am, take 2 additional tablets at 6 am. For additional instructions refer to your colonoscopy prep instructions. 4 tablet 0       Problem List:  Patient Active Problem List    Diagnosis Date Noted    Post-menopause bleeding 05/15/2024     Priority: Medium    Class 2 severe obesity due to excess calories with serious comorbidity in adult (H) 05/07/2024     Priority: Medium    High risk HPV infection 05/07/2024     Priority: Medium    Cough 05/13/2021     Priority: Medium    Conjunctivitis of both eyes, unspecified conjunctivitis type 05/13/2021     Priority: Medium    Diabetes mellitus, type 2 (H) 04/28/2021     Priority: Medium    Allergic rhinitis due to pollen, unspecified seasonality 04/28/2021     Priority: Medium    Wheezing 04/28/2021     Priority: Medium    Cervical high risk HPV (human papillomavirus) test positive 08/04/2017     Priority: Medium     08/04/17 NIL Pap, + HR HPV (not 16 or 18). Plan cotest in 1 year.   12/21/18  NIL Pap, Neg HR HPV. Plan: cotest in 3 years  04/28/21 NIL Pap, + HR HPV (not 16 or 18) Plan cotest in 1 year due 04/28/22.    8/25/22 Lost to follow-up for pap tracking  5/16/23 NIL pap, +HR HPV, not 16/18. Plan [place] bef 8/16/23 6/6/23 [place]: ECC, normal. Plan 1 yr co-test  5/7/24 NIL pap, +HR HPV, not 16/18. Plan Colposcopy bef 8/7/24 - per provider / notified      CARDIOVASCULAR SCREENING; LDL GOAL LESS THAN 160 03/28/2014     Priority: Medium        Past Medical/Surgical History:  Past Medical History:   Diagnosis Date    Cervical high risk HPV (human papillomavirus) test positive 08/04/2017 08/04/17, 04/28/21     No past surgical history on file.    Social History:  Social History     Socioeconomic History    Marital status:      Spouse name: Not on file    Number of children: Not on file    Years of education: Not on file    Highest education level: Not on file   Occupational History    Not on file   Tobacco Use    Smoking status: Never     Passive exposure: Never    Smokeless tobacco: Never   Vaping Use    Vaping status: Never Used   Substance and Sexual Activity    Alcohol use: Yes    Drug use: No    Sexual activity: Yes     Partners: Male   Other Topics Concern    Parent/sibling w/ CABG, MI or angioplasty before 65F 55M? Not Asked   Social History Narrative    Not on file     Social Determinants of Health     Financial Resource Strain: Not on file   Food Insecurity: Not on file   Transportation Needs: Not on file   Physical Activity: Not on file   Stress: Not on file   Social Connections: Not on file   Interpersonal Safety: Low Risk  (5/7/2024)    Interpersonal Safety     Do you feel physically and emotionally safe where you currently live?: Yes     Within the past 12 months, have you been hit, slapped, kicked or otherwise physically hurt by someone?: No     Within the past 12 months, have you been humiliated or emotionally abused in other ways by your partner or ex-partner?: No   Housing  "Stability: Not on file       Family History:  Family History   Problem Relation Age of Onset    Breast Cancer Mother      Review of Systems:  In the last TWO WEEKS have you experienced any of the following symptoms?  Fevers: No  Night Sweats: No  Weight Gain: No  Pain at Night: No  Double Vision: No  Changes in Vision: Yes  Difficulty Breathing through Nose: Yes  Sore Throat in Morning: Yes  Dry Mouth in the Morning: Yes  Shortness of Breath Lying Flat: Yes  Shortness of Breath With Activity: Yes  Awakening with Shortness of Breath: No  Increased Cough: Yes  Heart Racing at Night: No  Swelling in Feet or Legs: No  Diarrhea at Night: No  Heartburn at Night: No  Urinating More than Once at Night: No  Losing Control of Urine at Night: No  Joint Pains at Night: No  Headaches in Morning: Yes  Weakness in Arms or Legs: No  Depressed Mood: Yes  Anxiety: Yes     Physical Examination:  Vitals: BP (!) 139/95   Pulse 74   Ht 1.6 m (5' 3\")   Wt 98.9 kg (218 lb)   LMP  (LMP Unknown)   SpO2 97%   BMI 38.62 kg/m    BMI= Body mass index is 38.62 kg/m .  General appearance: Awake, alert, cooperative. Well groomed. Sitting comfortably in chair. In no apparent distress.  HEENT: Head: Normocephalic, atraumatic. Eyes: Right normal, subconjunctival hemorrhage of left eye developed spontaneously during visit.  Nose: External appearance normal. Oropharynx: Mallampati Classification:IV. Tonsils not visualized.  Neck: Neck Cir (cm): 43 cm (6/12/2024  2:45 PM)  Skin: No rashes or significant lesions.   Neurologic: Alert, oriented x3. No focal neurological deficit. Gait normal.   Psychiatric: Mood euthymic. Affect congruent with full range and intensity.         Data: All pertinent previous laboratory data reviewed     Recent Labs   Lab Test 05/07/24  1047 05/16/23  0747    140   POTASSIUM 3.9 4.0   CHLORIDE 106 104   CO2 27 26   ANIONGAP 9 10   GLC 91 107*   BUN 11.3 11.6   CR 0.86 0.81   JOAQUÍN 9.4 9.3       Recent Labs   Lab Test " "04/02/21  1032   WBC 9.0   RBC 4.26   HGB 11.4*   HCT 35.0   MCV 82   MCH 26.8   MCHC 32.6   RDW 15.0          Recent Labs   Lab Test 05/16/23  0747   PROTTOTAL 7.8   ALBUMIN 4.3   BILITOTAL 0.4   ALKPHOS 110*   AST 27   ALT 34       TSH   Date Value   05/07/2024 0.78 uIU/mL   05/16/2023 1.30 uIU/mL   04/28/2021 0.64 mU/L   12/21/2018 0.64 mU/L       No results found for: \"UAMP\", \"UBARB\", \"BENZODIAZEUR\", \"UCANN\", \"UCOC\", \"OPIT\", \"UPCP\"    No results found for: \"IRONSAT\", \"VW71921\", \"JULIEN\"    No results found for: \"PH\", \"PHARTERIAL\", \"PO2\", \"IR9YMVJQWMK\", \"SAT\", \"PCO2\", \"HCO3\", \"BASEEXCESS\", \"KENNY\", \"BEB\"    @LABRCNTIPR(phv:4,pco2v:4,po2v:4,hco3v:4,pearl:4,o2per:4)@    Echocardiology: No results found for this or any previous visit (from the past 4320 hour(s)).    Chest x-ray: No results found for this or any previous visit from the past 365 days.      Chest CT: No results found for this or any previous visit from the past 365 days.      PFT: Most Recent [facility] Pulmonary Function Testing    FVC-Pred   Date Value Ref Range Status   06/16/2021 3.21 L      FVC-Pre   Date Value Ref Range Status   06/16/2021 2.17 L      FVC-%Pred-Pre   Date Value Ref Range Status   06/16/2021 67 %      FEV1-Pre   Date Value Ref Range Status   06/16/2021 1.85 L      FEV1-%Pred-Pre   Date Value Ref Range Status   06/16/2021 70 %      FEV1FVC-Pred   Date Value Ref Range Status   06/16/2021 82 %      FEV1FVC-Pre   Date Value Ref Range Status   06/16/2021 85 %      No results found for: \"20029\"  FEFMax-Pred   Date Value Ref Range Status   06/16/2021 6.78 L/sec      FEFMax-Pre   Date Value Ref Range Status   06/16/2021 4.01 L/sec      FEFMax-%Pred-Pre   Date Value Ref Range Status   06/16/2021 59 %      ExpTime-Pre   Date Value Ref Range Status   06/16/2021 6.37 sec      FIFMax-Pre   Date Value Ref Range Status   06/16/2021 2.88 L/sec      FEV1FEV6-Pred   Date Value Ref Range Status   06/16/2021 83 %      FEV1FEV6-Pre   Date Value Ref Range " "Status   06/16/2021 85 %      No results found for: \"20055\"      Sherin Napoles PA-C 6/12/2024     [facility]  [address]  [address], [place] [address]     [facility]  [address], [place] [address]    Chart documentation was completed, in part, with [employer] voice-recognition software. Even though reviewed, some grammatical, spelling, and word errors may remain.    54 minutes spent on day of encounter reviewing medical records, history and physical examination, review of previous testing and interpretation, documentation and further activities as noted above        "

## 2024-06-12 NOTE — PATIENT INSTRUCTIONS
"          MY TREATMENT INFORMATION FOR SLEEP APNEA-  Berhane Murray  Frequently asked questions:  1. What is Obstructive Sleep Apnea (MIKE)? MIKE is the most common type of sleep apnea. Apnea means, \"without breath.\"  Apnea is most often caused by narrowing or collapse of the upper airway as muscles relax during sleep.   Almost everyone has occasional apneas. Most people with sleep apnea have had brief interruptions at night frequently for many years.  The severity of sleep apnea is related to how frequent and severe the events are.   2. What are the consequences of MIKE? Symptoms include: feeling sleepy during the day, snoring loudly, gasping or stopping of breathing, trouble sleeping, and occasionally morning headaches or heartburn at night.  Sleepiness can be serious and even increase the risk of falling asleep while driving. Other health consequences may include development of high blood pressure and other cardiovascular disease in persons who are susceptible. Untreated MIKE  can contribute to heart disease, stroke and diabetes.   3. What are the treatment options? In most situations, sleep apnea is a lifelong disease that must be managed with daily therapy. Medications are not effective for sleep apnea and surgery is generally not considered until other therapies have been tried. Your treatment is your choice . Continuous Positive Airway (CPAP) works right away and is the therapy that is effective in nearly everyone. An oral device to hold your jaw forward is usually the next most reliable option. Other options include postioning devices (to keep you off your back), weight loss, and surgery including a tongue pacing device. There is more detail about some of these options below.  4. Are my sleep studies covered by insurance? Although we will request verification of coverage, we advise you also check in advance of the study to ensure there is coverage.    Important tips for those choosing CPAP and similar " devices  REMEMBER-IF YOU RECEIVE A CALL FROM  [phone]-->IT IS TO SETUP A DEVICE  For new devices, sign up for device ALYSHA to monitor your device for your followup visits  We encourage you to utilize the [employer] alysha or website ( https://[employer]/ ) to monitor your therapy progress and share the data with your healthcare team when you discuss your sleep apnea.                                                    Know your equipment:  CPAP is continuous positive airway pressure that prevents obstructive sleep apnea by keeping the throat from collapsing while you are sleeping. In most cases, the device is  smart  and can slowly self-adjusts if your throat collapses and keeps a record every day of how well you are treated-this information is available to you and your care team.  BPAP is bilevel positive airway pressure that keeps your throat open and also assists each breath with a pressure boost to maintain adequate breathing.  Special kinds of BPAP are used in patients who have inadequate breathing from lung or heart disease. In most cases, the device is  smart  and can slowly self-adjusts to assist breathing. Like CPAP, the device keeps a record of how well you are treated.  Your mask is your connection to the device. You get to choose what feels most comfortable and the staff will help to make sure if fits. Here: are some examples of the different masks that are available: Magnetic mask aids may assist with use but there are safety issues that should be addressed when considering with magnets* ( see end of discussion).       Key points to remember on your journey with sleep apnea:  Sleep study.  PAP devices often need to be adjusted during a sleep study to show that they are effective and adjusted right.  Good tips to remember: Try wearing just the mask during a quiet time during the day so your body adapts to wearing it. A humidifier is recommended for comfort in most cases to prevent drying of  your nose and throat. Allergy medication from your provider may help you if you are having nasal congestion.  Getting settled-in. It takes more than one night for most of us to get used to wearing a mask. Try wearing just the mask during a quiet time during the day so your body adapts to wearing it. A humidifier is recommended for comfort in most cases. Our team will work with you carefully on the first day and will be in contact within 4 days and again at 2 and 4 weeks for advice and remote device adjustments. Your therapy is evaluated by the device each day.   Use it every night. The more you are able to sleep naturally for 7-8 hours, the more likely you will have good sleep and to prevent health risks or symptoms from sleep apnea. Even if you use it 4 hours it helps. Occasionally all of us are unable to use a medical therapy, in sleep apnea, it is not dangerous to miss one night.   Communicate. Call our skilled team on the number provided on the first day if your visit for problems that make it difficult to wear the device. Over 2 out of 3 patients can learn to wear the device long-term with help from our team. Remember to call our team or your sleep providers if you are unable to wear the device as we may have other solutions for those who cannot adapt to mask CPAP therapy. It is recommended that you sleep your sleep provider within the first 3 months and yearly after that if you are not having problems.   Use it for your health. We encourage use of CPAP masks during daytime quiet periods to allow your face and brain to adapt to the sensation of CPAP so that it will be a more natural sensation to awaken to at night or during naps. This can be very useful during the first few weeks or months of adapting to CPAP though it does not help medically to wear CPAP during wakefulness and  should not be used as a strategy just to meet guidelines.  Take care of your equipment. Make sure you clean your mask and tubing using  directions every day and that your filter and mask are replaced as recommended or if they are not working.     *Masks with magnets:  Updated Contraindications  Masks with magnetic components are contraindicated for use by patients where they, or anyone in close physical contact while using the mask, have the following:   Active medical implants that interact with magnets (i.e., pacemakers, implantable cardioverter defibrillators (ICD), neurostimulators, cerebrospinal fluid (CSF) shunts, insulin/infusion pumps)   Metallic implants/objects containing ferromagnetic material (i.e., aneurysm clips/flow disruption devices, embolic coils, stents, valves, electrodes, implants to restore hearing or balance with implanted magnets, ocular implants, metallic splinters in the eye)  Updated Warning  Keep the mask magnets at a safe distance of at least 6 inches (150 mm) away from implants or medical devices that may be adversely affected by magnetic interference. This warning applies to you or anyone in close physical contact with your mask. The magnets are in the frame and lower headgear clips, with a magnetic field strength of up to 400mT. When worn, they connect to secure the mask but may inadvertently detach while asleep.  Implants/medical devices, including those listed within contraindications, may be adversely affected if they change function under external magnetic fields or contain ferromagnetic materials that attract/repel to magnetic fields (some metallic implants, e.g., contact lenses with metal, dental implants, metallic cranial plates, screws, shaheed hole covers, and bone substitute devices). Consult your physician and  of your implant / other medical device for information on the potential adverse effects of magnetic fields.    BESIDES CPAP, WHAT OTHER THERAPIES ARE THERE?    Positioning Device  Positioning devices are generally used when sleep apnea is mild and only occurs on your back.This example shows  a pillow that straps around the waist. It may be appropriate for those whose sleep study shows milder sleep apnea that occurs primarily when lying flat on one's back. Preliminary studies have shown benefit but effectiveness at home may need to be verified by a home sleep test. These devices are generally not covered by medical insurance.  Examples of devices that maintain sleeping on the back to prevent snoring and mild sleep apnea.    Belt type body positioner  http://[employer]/    Electronic reminder  http://nightshifttherapy.com/            Oral Appliance  What is oral appliance therapy?  An oral appliance device fits on your teeth at night like a retainer used after having braces. The device is made by a specialized dentist and requires several visits over 1-2 months before a manufactured device is made to fit your teeth and is adjusted to prevent your sleep apnea. Once an oral device is working properly, snoring should be improved. A home sleep test may be recommended at that time if to determine whether the sleep apnea is adequately treated.       Some things to remember:  -Oral devices are often, but not always, covered by your medical insurance. Be sure to check with your insurance provider.   -If you are referred for oral therapy, you will be given a list of specialized dentists to consider or you may choose to visit the Web site of the American Academy of Dental Sleep Medicine  -Oral devices are less likely to work if you have severe sleep apnea or are extremely overweight.     More detailed information  An oral appliance is a small acrylic device that fits over the upper and lower teeth  (similar to a retainer or a mouth guard). This device slightly moves jaw forward, which moves the base of the tongue forward, opens the airway, improves breathing for effective treat snoring and obstructive sleep apnea in perhaps 7 out of 10 people .  The best working devices are custom-made by a dental device   after a mold is made of the teeth 1, 2, 3.  When is an oral appliance indicated?  Oral appliance therapy is recommended as a first-line treatment for patients with primary snoring, mild sleep apnea, and for patients with moderate sleep apnea who prefer appliance therapy to use of CPAP4, 5. Severity of sleep apnea is determined by sleep testing and is based on the number of respiratory events per hour of sleep.   How successful is oral appliance therapy?  The success rate of oral appliance therapy in patients with mild sleep apnea is 75-80% while in patients with moderate sleep apnea it is 50-70%. The chance of success in patients with severe sleep apnea is 40-50%. The research also shows that oral appliances have a beneficial effect on the cardiovascular health of MIKE patients at the same magnitude as CPAP therapy7.  Oral appliances should be a second-line treatment in cases of severe sleep apnea, but if not completely successful then a combination therapy utilizing CPAP plus oral appliance therapy may be effective. Oral appliances tend to be effective in a broad range of patients although studies show that the patients who have the highest success are females, younger patients, those with milder disease, and less severe obesity. 3, 6.   Finding a dentist that practices dental sleep medicine  Specific training is available through the American Academy of Dental Sleep Medicine for dentists interested in working in the field of sleep. To find a dentist who is educated in the field of sleep and the use of oral appliances, near you, visit the Web site of the American Academy of Dental Sleep Medicine.    References  1. Kate, et al. Objectively measured vs self-reported compliance during oral appliance therapy for sleep-disordered breathing. Chest 2013; 144(5): 5697-6350.  2. Becca et al. Objective measurement of compliance during oral appliance therapy for sleep-disordered breathing. Thorax  2013; 68(1): 91-96.  3. Santosh et al. Mandibular advancement devices in 620 men and women with MIKE and snoring: tolerability and predictors of treatment success. Chest 2004; 125: 6399-3116.  4. Shaan et al. Oral appliances for snoring and MIKE: a review. Sleep 2006; 29: 244-262.  5. Diane et al. Oral appliance treatment for MIKE: an update. J Clin Sleep Med 2014; 10(2): 215-227.  6. Mitchell et al. Predictors of OSAH treatment outcome. J Dent Res 2007; 86: 2975-2538.      Weight Loss:   Your Body mass index is 38.62 kg/m .    Being overweight does not necessarily mean you will have health consequences.  Those who have BMI over 35 or over 27 with existing medical conditions carries greater risk.   Weight loss decreases severity of sleep apnea in most people with obesity. For those with mild obesity who have developed snoring with weight gain, even 15-30 pound weight loss can improve and occasionally milder eliminate sleep apnea.  Structured and life-long dietary and health habits are necessary to lose weight and keep healthier weight levels.     The Comprehensive Weight loss program offers all aspects of weight loss strategies including two Non-Surgical Weight Loss Programs: Medical Weight Management and our 24 Week Healthy Lifestyle Program:    Medical Weight Management: You will meet with a Medical Weight Management Provider, as well as a Registered Dietician. The program may include medication therapy, dietary education, recommended exercise and physical therapy programs, monthly support group meetings, and possible psychological counseling. Follow up visits with the provider or dietician are scheduled based on your progress and needs.    24 Week Healthy Lifestyle Program: This unique program is designed to give you the support of weekly appointments and activities thru a 24-week period. It may include all of the components of the basic program (above), with the addition of 11 individual Health   Visits, 24-week access to the [employer] website for over 700 online classes, and monthly support group meetings. This program has an out-of-pocket expense of $499 to cover the items that can not be billed to insurance (health coaches and [employer] access), and is non-refundable/non-transferable (you may be able to use a Health Savings Account; ask your HSA provider). There may be an optional meal replacement plan prescribed as well.   Surgical management achieves meaningful long-term weight loss and improvement in health risks in most patients with more severe obesity.      Sleep Apnea Surgery:    Surgery for obstructive sleep apnea is considered generally only when other therapies fail to work. Surgery may be discussed with you if you are having a difficult time tolerating CPAP and or when there is an abnormal structure that requires surgical correction.  Nose and throat surgeries often enlarge the airway to prevent collapse.  Most of these surgeries create pain for 1-2 weeks and up to half of the most common surgeries are not effective throughout life.  You should carefully discuss the benefits and drawbacks to surgery with your sleep provider and surgeon to determine if it is the best solution for you.   More information  Surgery for MIKE is directed at areas that are responsible for narrowing or complete obstruction of the airway during sleep.  There are a wide range of procedures available to enlarge and/or stabilize the airway to prevent blockage of breathing in the three major areas where it can occur: the palate, tongue, and nasal regions.  Successful surgical treatment depends on the accurate identification of the factors responsible for obstructive sleep apnea in each person.  A personalized approach is required because there is no single treatment that works well for everyone.  Because of anatomic variation, consultation with an examination by a sleep surgeon is a critical first step in determining what  surgical options are best for each patient.  In some cases, examination during sedation may be recommended in order to guide the selection of procedures.  Patients will be counseled about risks and benefits as well as the typical recovery course after surgery. Surgery is typically not a cure for a person s MIKE.  However, surgery will often significantly improve one s MIKE severity (termed  success rate ).  Even in the absence of a cure, surgery will decrease the cardiovascular risk associated with OSA7; improve overall quality of life8 (sleepiness, functionality, sleep quality, etc).      Palate Procedures:  Patients with MIKE often have narrowing of their airway in the region of their tonsils and uvula.  The goals of palate procedures are to widen the airway in this region as well as to help the tissues resist collapse.  Modern palate procedure techniques focus on tissue conservation and soft tissue rearrangement, rather than tissue removal.  Often the uvula is preserved in this procedure. Residual sleep apnea is common in patient after pharyngoplasty with an average reduction in sleep apnea events of 33%2.      Tongue Procedures:  ExamWhile patients are awake, the muscles that surround the throat are active and keep this region open for breathing. These muscles relax during sleep, allowing the tongue and other structures to collapse and block breathing.  There are several different tongue procedures available.  Selection of a tongue base procedure depends on characteristics seen on physical exam.  Generally, procedures are aimed at removing bulky tissues in this area or preventing the back of the tongue from falling back during sleep.  Success rates for tongue surgery range from 50-62%3.    Hypoglossal Nerve Stimulation:  Hypoglossal nerve stimulation has recently received approval from the [place] Food and Drug Administration for the treatment of obstructive sleep apnea.  This is based on research showing  that the system was safe and effective in treating sleep apnea6.  Results showed that the median AHI score decreased 68%, from 29.3 to 9.0. This therapy uses an implant system that senses breathing patterns and delivers mild stimulation to airway muscles, which keeps the airway open during sleep.  The system consists of three fully implanted components: a small generator (similar in size to a pacemaker), a breathing sensor, and a stimulation lead.  Using a small handheld remote, a patient turns the therapy on before bed and off upon awakening.    Candidates for this device must be greater than 18 years of age, have moderate to severe obstructive sleep apnea with less than 25% central events  (AHI between 15-65), BMI less than 35, have tried CPAP/oral appliance for at least 8 weeks without success, and have appropriate upper airway anatomy (determined by a sleep endoscopy performed by Dr. Pedro Baez or Dr. Percy Soto).    Nasal Procedures:  Nasal obstruction can interfere with nasal breathing during the day and night.  Studies have shown that relief of nasal obstruction can improve the ability of some patients to tolerate positive airway pressure therapy for obstructive sleep apnea1.  Treatment options include medications such as nasal saline, topical corticosteroid and antihistamine sprays, and oral medications such as antihistamines or decongestants. Non-surgical treatments can include external nasal dilators for selected patients. If these are not successful by themselves, surgery can improve the nasal airway either alone or in combination with these other options.        Combination Procedures:  Combination of surgical procedures and other treatments may be recommended, particularly if patients have more than one area of narrowing or persistent positional disease.  The success rate of combination surgery ranges from 66-80%2,3.    References  [place] HEL[employer]. The Role of the Nose in Snoring and Obstructive Sleep  Apnoea: An Update.  Eur Arch Otorhinolaryngol. 2011; 268: 1365-73.   Gregory SM; Reina JA; Spencer JR; Pallanch JF; Hector MB; Humberto SG; Perez CRUZ. Surgical modifications of the upper airway for obstructive sleep apnea in adults: a systematic review and meta-analysis. SLEEP 2010;33(10):8173-9288. Erich STARKS. Hypopharyngeal surgery in obstructive sleep apnea: an evidence-based medicine review.  Arch Otolaryngol Head Neck Surg. 2006 Feb;132(2):206-13.  Bacilio YH1, Park Y, Trevor HUMBERTO. The efficacy of anatomically based multilevel surgery for obstructive sleep apnea. Otolaryngol Head Neck Surg. 2003 Oct;129(4):327-35.  Kezirian E, Goldberg A. Hypopharyngeal Surgery in Obstructive Sleep Apnea: An Evidence-Based Medicine Review. Arch Otolaryngol Head Neck Surg. 2006 Feb;132(2):206-13.  Esperanza HORN et al. Upper-Airway Stimulation for Obstructive Sleep Apnea.  N Engl J Med. 2014 Jan 9;370(2):139-49.  Peker Y et al. Increased Incidence of Cardiovascular Disease in Middle-aged Men with Obstructive Sleep Apnea. Am J Respir Crit Care Med; 2002 166: 159-165  Robertson EM et al. Studying Life Effects and Effectiveness of Palatopharyngoplasty (SLEEP) study: Subjective Outcomes of Isolated Uvulopalatopharyngoplasty. Otolaryngol Head Neck Surg. 2011; 144: 623-631.        WHAT IF I ONLY HAVE SNORING?    Mandibular advancement devices, lateral sleep positioning, long-term weight loss and treatment of nasal allergies have been shown to improve snoring.  Exercising tongue muscles with a game (https://apps.[employer]/us/alysha/soundly-reduce-snoring/yh9017288339) or stimulating the tongue during the day with a device (https://doi.org/10.3390/ygh06507373) have improved snoring in some individuals.  https://www.[employer].com/  https://www.sleepfoundation.org/best-anti-snoring-mouthpieces-and-mouthguards    Remember to Drive Safe... Drive Alive     Sleep health profoundly affects your health, mood, and your safety.  Thirty three percent of the  population (one in three of us) is not getting enough sleep and many have a sleep disorder. Not getting enough sleep or having an untreated / undertreated sleep condition may make us sleepy without even knowing it. In fact, our driving could be dramatically impaired due to our sleep health. As your provider, here are some things I would like you to know about driving:     Here are some warning signs for impairment and dangerous drowsy driving:              -Having been awake more than 16 hours               -Looking tired               -Eyelid drooping              -Head nodding (it could be too late at this point)              -Driving for more than 30 minutes     Some things you could do to make the driving safer if you are experiencing some drowsiness:              -Stop driving and rest              -Call for transportation              -Make sure your sleep disorder is adequately treated     Some things that have been shown NOT to work when experiencing drowsiness while driving:              -Turning on the radio              -Opening windows              -Eating any  distracting  /  entertaining  foods (e.g., sunflower seeds, candy, or any other)              -Talking on the phone      Your decision may not only impact your life, but also the life of others. Please, remember to drive safe for yourself and all of us.

## 2024-06-12 NOTE — NURSING NOTE
"Chief Complaint   Patient presents with    Consult     Snoring, difficulty falling and staying asleep        Initial BP (!) 139/95   Pulse 74   Ht 1.6 m (5' 3\")   Wt 98.9 kg (218 lb)   LMP  (LMP Unknown)   SpO2 97%   BMI 38.62 kg/m   Estimated body mass index is 38.62 kg/m  as calculated from the following:    Height as of this encounter: 1.6 m (5' 3\").    Weight as of this encounter: 98.9 kg (218 lb).    Medication Reconciliation: complete  ESS 21  MIAN 22  Neck circumference: 17 inches / 43 centimeters.  Ashwini Topete MA      "

## 2024-06-22 ENCOUNTER — HEALTH MAINTENANCE LETTER (OUTPATIENT)
Age: 49
End: 2024-06-22

## 2024-07-12 ENCOUNTER — ANCILLARY PROCEDURE (OUTPATIENT)
Dept: MAMMOGRAPHY | Facility: CLINIC | Age: 49
End: 2024-07-12
Attending: FAMILY MEDICINE
Payer: COMMERCIAL

## 2024-07-12 DIAGNOSIS — Z12.31 VISIT FOR SCREENING MAMMOGRAM: ICD-10-CM

## 2024-07-12 PROCEDURE — 77063 BREAST TOMOSYNTHESIS BI: CPT | Mod: TC | Performed by: RADIOLOGY

## 2024-07-12 PROCEDURE — 77067 SCR MAMMO BI INCL CAD: CPT | Mod: TC | Performed by: RADIOLOGY

## 2024-07-19 ENCOUNTER — OFFICE VISIT (OUTPATIENT)
Dept: OBGYN | Facility: CLINIC | Age: 49
End: 2024-07-19
Attending: STUDENT IN AN ORGANIZED HEALTH CARE EDUCATION/TRAINING PROGRAM
Payer: COMMERCIAL

## 2024-07-19 VITALS
WEIGHT: 211.6 LBS | HEIGHT: 63 IN | SYSTOLIC BLOOD PRESSURE: 128 MMHG | BODY MASS INDEX: 37.49 KG/M2 | DIASTOLIC BLOOD PRESSURE: 87 MMHG | HEART RATE: 75 BPM

## 2024-07-19 DIAGNOSIS — R87.810 CERVICAL HIGH RISK HUMAN PAPILLOMAVIRUS (HPV) DNA TEST POSITIVE: Primary | ICD-10-CM

## 2024-07-19 LAB
HCG UR QL: NEGATIVE
INTERNAL QC OK POCT: NORMAL
POCT KIT EXPIRATION DATE: NORMAL
POCT KIT LOT NUMBER: NORMAL

## 2024-07-19 PROCEDURE — 88305 TISSUE EXAM BY PATHOLOGIST: CPT | Mod: 26 | Performed by: PATHOLOGY

## 2024-07-19 PROCEDURE — 88305 TISSUE EXAM BY PATHOLOGIST: CPT | Mod: TC | Performed by: STUDENT IN AN ORGANIZED HEALTH CARE EDUCATION/TRAINING PROGRAM

## 2024-07-19 PROCEDURE — 81025 URINE PREGNANCY TEST: CPT | Performed by: STUDENT IN AN ORGANIZED HEALTH CARE EDUCATION/TRAINING PROGRAM

## 2024-07-19 PROCEDURE — 57454 BX/CURETT OF CERVIX W/SCOPE: CPT | Performed by: STUDENT IN AN ORGANIZED HEALTH CARE EDUCATION/TRAINING PROGRAM

## 2024-07-19 PROCEDURE — 250N000013 HC RX MED GY IP 250 OP 250 PS 637: Performed by: STUDENT IN AN ORGANIZED HEALTH CARE EDUCATION/TRAINING PROGRAM

## 2024-07-19 PROCEDURE — 57452 EXAM OF CERVIX W/SCOPE: CPT | Performed by: STUDENT IN AN ORGANIZED HEALTH CARE EDUCATION/TRAINING PROGRAM

## 2024-07-19 RX ORDER — IBUPROFEN 200 MG
600 TABLET ORAL ONCE
Status: COMPLETED | OUTPATIENT
Start: 2024-07-19 | End: 2024-07-19

## 2024-07-19 RX ADMIN — IBUPROFEN 600 MG: 200 TABLET, FILM COATED ORAL at 14:15

## 2024-07-19 NOTE — PATIENT INSTRUCTIONS
Thank you for trusting us with your care!   Please be aware, if you are on Mychart, you may see your results prior to your providers review. If labs are abnormal, we will call or message you on [facility] with a follow up plan.    If you need to contact us for questions about:  Symptoms, Scheduling & Medical Questions; Non-urgent (2-3 day response) TRAN.SL message, Urgent (needing response today) [phone] (if after 3:30pm next day response)   Prescriptions: Please call your Pharmacy   Billing: [facility] [phone] or  Physicians:[phone]    Colposcopy Post-Procedure Patient Instructions      You may experience any of the following for a couple of days following colposcopy:  Mild cramping  Vaginal bleeding   Vaginal discharge that looks black and clumpy    Please call your healthcare provider if you have any of the following symptoms:  Fever--Temperature greater than 100 degrees  Cramping after 48 hours  Bleeding heavier than a normal period in the first 24-48 hours  If you are bleeding and soaking more than one pad an hour  Or any other worrisome problems.    We recommend that:  You use pads, not tampons for the bleeding.  You may resume sexual activity in 2-3 days, or after bleeding stops.  You may use Tylenol or ibuprofen (Motrin or Advil) for any discomfort.

## 2024-07-19 NOTE — PROGRESS NOTES
"Colposcopy Visit/Procedure Note:    Berhane Murray is an 49 year old, , who comes in for diagnosis of abnormal pap screen.     Menstrual History:      2014     2:40 PM 2017    10:20 AM 2019     5:40 PM   Menstrual History   LAST MENSTRUAL PERIOD 2014     Dates/results of previous cervical pathology:         2014: NILM  2017: NILM, +HRHPV  2018: NILM, - HPV  2021: NILM, +HRHPV  2023: NILM, +HRHPV  2023: No visible lesions, ECC benign  2024: NILM, +HRHPV     History   Smoking Status    Never   Smokeless Tobacco    Never       Allergies as of 2024 - Reviewed 2024   Allergen Reaction Noted    Prednisone  2017        Colposcopy Procedure:    Consent:  Details of the colposcopic procedure were reviewed. Risks, benefits of treatment, and alternate forms of evaluation were discussed.  Patient's questions were elicited and answered.   Written consent was obtained and scanned into medical record.     Verification of Procedure  Just before the procedure begins, through verbal and active participation of team members, I verified:   Initials   Patient Name MKW   Patient  MKW   Procedure to be performed MKW       OBJECTIVE: /87   Pulse 75   Ht 1.6 m (5' 3\")   Wt 96 kg (211 lb 9.6 oz)   LMP  (LMP Unknown)   BMI 37.48 kg/m      Pelvic Exam:  EG/BUS: Normal genital architecture without lesions, erythema or abnormal secretions. Bartholin's, Urethra, Coloma's glands are normal.   Vagina: moist, pink, rugae with physiologic discharge  Cervix: Multiparous, and no lesions      PROCEDURE:    Acetic acid and/or Lugol's solution applied to cervix.  Colposcopic exam of the cervix and apex of the vagina was conducted in the usual fashion.     Findings:  SCJ was not seen entirely.    There were small acetowhite lesions at 1 o'clock, 3 o'clock, and 11 o'clock.    Biopsies were obtained at 1 o'clock, 3 o'clock, and 11 o'clock and placed in labeled Formalin " Jar.    ECC: was  obtained and placed in labeled Formalin Jar.    Assessment: HPV related changes    Plan:   Biopsies sent to pathology.  Will contact patient with results and recommended follow-up plan.     Patient advised to contact clinic with heavy vaginal bleeding, fever over 101 degrees F, or any other concerns.    Advised that evaluation of sexual contacts is NOT warranted as she can not get the same virus again. Risk of exposure to a NEW virus is possible with partner change.    Verbalized understanding and agreement with plan.        Ruby Jimenez MD,  07/19/24 2:37 PM

## 2024-07-19 NOTE — PROGRESS NOTES
"Colposcopy Visit/Procedure Note:    Berhane Murray is an 49 year old, , who comes in for diagnosis of abnormal pap screen.     Menstrual History:      2014     2:40 PM 2017    10:20 AM 2019     5:40 PM   Menstrual History   LAST MENSTRUAL PERIOD 2014       Lab Results   Component Value Date    PAP NIL 2021    PAP NIL 2018    PAP NIL 2017       Last   Lab Results   Component Value Date    HPV16 Negative 2024    HPV16 Negative 2021     Last   Lab Results   Component Value Date    HPV18 Negative 2024    HPV18 Negative 2021     Last   Lab Results   Component Value Date    HRHPV Positive 2024    HRHPV Positive 2021       Dates/results of previous cervical pathology: ***        Dates of previous cervical pathology treatment: ***    History   Smoking Status     Never   Smokeless Tobacco     Never       Allergies as of 2024 - Reviewed 2024   Allergen Reaction Noted     Prednisone  2017        Colposcopy Procedure:    Consent:  Details of the colposcopic procedure were reviewed. Risks, benefits of treatment, and alternate forms of evaluation were discussed.  Patient's questions were elicited and answered.   Written consent was obtained and scanned into medical record.     Verification of Procedure  Just before the procedure begins, through verbal and active participation of team members, I verified:   Initials   Patient Name ***   Patient  ***   Procedure to be performed ***       OBJECTIVE: /87   Pulse 75   Ht 1.6 m (5' 3\")   Wt 96 kg (211 lb 9.6 oz)   LMP  (LMP Unknown)   BMI 37.48 kg/m      Pelvic Exam:  EG/BUS: {[facility] USPEC VULVA EXAM :418927}   Vagina: {VAGINAL MUCOSA:031632::\"moist, pink, rugae\"} with {Color:482767::\"creamy\",\"white\",\"odorless\"}secretions  Cervix: {CERVIX :033404}  Rectum:{RECTAL NEGATIVES LIST:569421}    PROCEDURE:    Repeat Pap collected? If no, DELETE   {Repeat pap collected? "  If no, DELETE:599652429}    Acetic acid and/or Lugol's solution applied to {Vulva/Vagina/Cervix:758642475}.  Colposcopic exam of the {Vulva/Vagina/Cervix:201257024} and apex of the vagina was conducted in the usual fashion.     Findings:  {SCJ/NA:785215755} was *** seen entirely and the exam ***satisfactory.    There were {COLPOSCOPY CERVIX FINDINGS:328922008}    Biopsies were *** obtained at *** and placed in labeled Formalin Jar.    {ECC/NA:183706098}: was *** obtained and placed in labeled Formalin Jar.    EMB done? If no, DELETE   {EMB done? If no, DELETE:777400752}    Assessment: {:733}    Plan:   {Gardisil vaccine:854742032}    Biopsies sent to pathology.  Will contact patient with results and recommended follow-up plan.  {Colposcopy Plan:027276258}    Patient advised to contact clinic with heavy vaginal bleeding, fever over 101 degrees F, or any other concerns.    Advised that evaluation of sexual contacts is NOT warranted as she can not get the same virus again. Risk of exposure to a NEW virus is possible with partner change.    Verbalized understanding and agreement with plan.

## 2024-07-19 NOTE — LETTER
"2024       RE: Berhane Murray  [address]  [place] [address]     Dear Colleague,    Thank you for referring your patient, Berhane Murray, to the [facility] [place] [facility]. Please see a copy of my visit note below.    Colposcopy Visit/Procedure Note:    Berhane Murray is an 49 year old, , who comes in for diagnosis of abnormal pap screen.     Menstrual History:      2014     2:40 PM 2017    10:20 AM 2019     5:40 PM   Menstrual History   LAST MENSTRUAL PERIOD 2014     Dates/results of previous cervical pathology:         2014: NILM  2017: NILM, +HRHPV  2018: NILM, - HPV  2021: NILM, +HRHPV  2023: NILM, +HRHPV  2023: No visible lesions, ECC benign  2024: NILM, +HRHPV     History   Smoking Status     Never   Smokeless Tobacco     Never       Allergies as of 2024 - Reviewed 2024   Allergen Reaction Noted     Prednisone  2017        Colposcopy Procedure:    Consent:  Details of the colposcopic procedure were reviewed. Risks, benefits of treatment, and alternate forms of evaluation were discussed.  Patient's questions were elicited and answered.   Written consent was obtained and scanned into medical record.     Verification of Procedure  Just before the procedure begins, through verbal and active participation of team members, I verified:   Initials   Patient Name MKW   Patient  MKW   Procedure to be performed [facility]       OBJECTIVE: /87   Pulse 75   Ht 1.6 m (5' 3\")   Wt 96 kg (211 lb 9.6 oz)   LMP  (LMP Unknown)   BMI 37.48 kg/m      Pelvic Exam:  EG/BUS: Normal genital architecture without lesions, erythema or abnormal secretions. Bartholin's, Urethra, Savona's glands are normal.   Vagina: moist, pink, rugae with physiologic discharge  Cervix: Multiparous, and no lesions      PROCEDURE:    Acetic acid and/or Lugol's solution applied to cervix.  " Colposcopic exam of the cervix and apex of the vagina was conducted in the usual fashion.     Findings:  SCJ was not seen entirely.    There were small acetowhite lesions at 1 o'clock, 3 o'clock, and 11 o'clock.    Biopsies were obtained at 1 o'clock, 3 o'clock, and 11 o'clock and placed in labeled Formalin Jar.    ECC: was  obtained and placed in labeled Formalin Jar.    Assessment: HPV related changes    Plan:   Biopsies sent to pathology.  Will contact patient with results and recommended follow-up plan.     Patient advised to contact clinic with heavy vaginal bleeding, fever over 101 degrees F, or any other concerns.    Advised that evaluation of sexual contacts is NOT warranted as she can not get the same virus again. Risk of exposure to a NEW virus is possible with partner change.    Verbalized understanding and agreement with plan.        Ruby Jimenez MD,  07/19/24 2:37 PM      Again, thank you for allowing me to participate in the care of your patient.      Sincerely,    Ruby Jimenez MD

## 2024-07-25 ENCOUNTER — PATIENT OUTREACH (OUTPATIENT)
Dept: FAMILY MEDICINE | Facility: CLINIC | Age: 49
End: 2024-07-25
Payer: COMMERCIAL

## 2024-07-25 DIAGNOSIS — R87.810 CERVICAL HIGH RISK HPV (HUMAN PAPILLOMAVIRUS) TEST POSITIVE: Primary | ICD-10-CM

## 2024-08-16 ENCOUNTER — THERAPY VISIT (OUTPATIENT)
Dept: SLEEP MEDICINE | Facility: CLINIC | Age: 49
End: 2024-08-16
Attending: PHYSICIAN ASSISTANT
Payer: COMMERCIAL

## 2024-08-16 DIAGNOSIS — E66.9 OBESITY (BMI 30-39.9): ICD-10-CM

## 2024-08-16 DIAGNOSIS — E11.9 TYPE 2 DIABETES MELLITUS WITHOUT COMPLICATION, WITHOUT LONG-TERM CURRENT USE OF INSULIN (H): ICD-10-CM

## 2024-08-16 DIAGNOSIS — R06.83 SNORING: ICD-10-CM

## 2024-08-16 DIAGNOSIS — G47.00 INSOMNIA, UNSPECIFIED TYPE: ICD-10-CM

## 2024-08-16 DIAGNOSIS — G47.33 OSA (OBSTRUCTIVE SLEEP APNEA): Primary | ICD-10-CM

## 2024-08-16 DIAGNOSIS — G47.19 EXCESSIVE DAYTIME SLEEPINESS: ICD-10-CM

## 2024-08-16 DIAGNOSIS — R06.81 WITNESSED EPISODE OF APNEA: ICD-10-CM

## 2024-08-16 PROCEDURE — 95810 POLYSOM 6/> YRS 4/> PARAM: CPT | Performed by: INTERNAL MEDICINE

## 2024-08-31 ENCOUNTER — HEALTH MAINTENANCE LETTER (OUTPATIENT)
Age: 49
End: 2024-08-31

## 2024-09-06 ENCOUNTER — MYC MEDICAL ADVICE (OUTPATIENT)
Dept: SLEEP MEDICINE | Facility: CLINIC | Age: 49
End: 2024-09-06
Payer: COMMERCIAL

## 2024-09-06 DIAGNOSIS — G47.33 OSA (OBSTRUCTIVE SLEEP APNEA): Primary | ICD-10-CM

## 2024-09-06 DIAGNOSIS — G47.19 EXCESSIVE DAYTIME SLEEPINESS: ICD-10-CM

## 2024-09-06 NOTE — PROCEDURES
"SLEEP STUDY INTERPRETATION  DIAGNOSTIC POLYSOMNOGRAPHY REPORT      Patient: MEAGAN GLOVER  YOB: 1975  Study Date: 8/16/2024  MRN: [id number]  Referring Provider: -  Ordering Provider: SUZI Cervantes    Indications for Polysomnography: The patient is a 49 year old Female who is 5' 3\" and weighs 218.0 lbs. Her BMI is 38.6, [place] sleepiness scale 21 and neck circumference is 43 cm. A diagnostic polysomnogram was performed to evaluate for sleep apnea.    Polysomnogram Data: A full night polysomnogram recorded the standard physiologic parameters including EEG, EOG, EMG, ECG, nasal and oral airflow. Respiratory parameters of chest and abdominal movements were recorded with respiratory inductance plethysmography. Oxygen saturation was recorded by pulse oximetry. Hypopnea scoring rule used: 1B 4%.    Sleep Architecture: The total recording time of the polysomnogram was 505.9 minutes. The total sleep time was 464.0 minutes. Sleep latency was normal at 16.5 minutes without the use of a sleep aid. REM latency was 113.5 minutes. Arousal index was mildly increased at 14.7 arousals per hour. Sleep efficiency was normal at 91.7%. Wake after sleep onset was 25.5 minutes. The patient spent 2.7% of total sleep time in Stage N1, 75.1% in Stage N2, 2.9% in Stage N3, and 19.3% in REM. Time in REM supine was 24.5 minutes.    Respiration: Events ? The polysomnogram revealed a presence of 7 obstructive, 1 central, and - mixed apneas resulting in an apnea index of 1.0 events per hour. There were 76 obstructive hypopneas and - central hypopneas resulting in an obstructive hypopnea index of 9.8 and central hypopnea index of - events per hour. The combined apnea/hypopnea index was 10.9 events per hour (central apnea/hypopnea index was 0.1 events per hour). The REM AHI was 46.9 events per hour. The supine AHI was 11.0 events per hour. The RERA index was 3.9 events per hour.  The RDI was 14.7 events per hour.  Snoring - was " reported as moderate to loud.  Respiratory rate and pattern - was notable for normal respiratory rate and pattern.  Sustained Sleep Associated Hypoventilation - Transcutaneous carbon dioxide monitoring was not used; however significant hypoventilation was not suggested by oximetry.  Sleep Associated Hypoxemia - (Greater than 5 minutes O2 sat at or below 88%) was present. Baseline oxygen saturation was 94.0%. Lowest oxygen saturation was 80.0%. Time spent less than or equal to 88% was 6.8 minutes. Time spent less than or equal to 89% was 10.4 minutes.    Movement Activity: Negative for significant movement abnormalities.   Periodic Limb Activity - There were 5 PLMs during the entire study. The PLM index was 0.6 movements per hour. The PLM Arousal Index was - per hour.  REM EMG Activity - Excessive transient/sustained muscle activity was not present.  Nocturnal Behavior - Abnormal sleep related behaviors were not noted during/arising out of NREM / REM sleep.   Bruxism - None apparent.    Cardiac Summary: Sinus rhythm.   The average pulse rate was 70.1 bpm. The minimum pulse rate was 51.0 bpm while the maximum pulse rate was 97.0 bpm.  Arrhythmias were not noted.    Assessment:   This sleep study shows REM predominant obstructive sleep apnea with associated oxygen desaturations and sleep fragmentation. Although overall apnea hypopnea index is in the mild range, significant exacerbation of sleep disordered breathing is noted in REM sleep.     Recommendations:  Depending on clinical indications for treatment of mild obstructive sleep apnea, following therapy options can be considered.   If there is excessive daytime sleepiness or other qualifying medical comorbidity, recommend treatment with Auto?titrating PAP therapy with a range of 5 to 15 cmH2O. Recommend appropriate clinical follow up in 1-3 months after starting treatment to monitor response, compliance and CPAP download data.   If CPAP is not accepted or tolerated,  patient can consider dental appliance through referral to Sleep Dentistry for the treatment of obstructive sleep apnea.  If devices are not acceptable or effective, patient may benefit from evaluation of possible surgical options. If she is interested, would recommend referral to specialized ENT-Sleep provider.  Suggest optimizing sleep schedule and avoiding sleep deprivation.  Weight management (if BMI > 30).    Diagnostic Codes:   Obstructive Sleep Apnea G47.33  Sleep Hypoxemia G47.36     8/16/2024 [facility] Study (218.0 lbs) - AHI 10.9, RDI 14.7, Supine AHI 11.0, REM AHI 46.9, Low O2 80.0%, Time Spent ?88% 6.8 minutes / Time Spent ?89% 10.4 minutes.     _____________________________________   Electronically Signed By: Rl Good MD 09/06/2024

## 2024-09-09 LAB — SLPCOMP: NORMAL

## 2024-10-11 ENCOUNTER — DOCUMENTATION ONLY (OUTPATIENT)
Dept: SLEEP MEDICINE | Facility: CLINIC | Age: 49
End: 2024-10-11
Payer: COMMERCIAL

## 2024-10-11 DIAGNOSIS — G47.33 OBSTRUCTIVE SLEEP APNEA: Primary | ICD-10-CM

## 2024-10-11 NOTE — PROGRESS NOTES
Patient was offered choice of vendor and chose [facility].  Patient Berhane Murray was set up at [facility] on October 11, 2024. Patient received a Resmed Airsense 11 Pressures were set at  5-15 cm H2O.   Patient s ramp is 5 cm H2O for Auto and FLEX/EPR is 2.  Patient received a Resmed Mask name: AIRFIT F20  Full Face mask size Medium, heated tubing and heated humidifier.  Patient has the following compliance requirements: none    Joyce Clinton

## 2024-10-14 ENCOUNTER — DOCUMENTATION ONLY (OUTPATIENT)
Dept: SLEEP MEDICINE | Facility: CLINIC | Age: 49
End: 2024-10-14
Payer: COMMERCIAL

## 2024-10-14 NOTE — PROGRESS NOTES
3 day Sleep therapy management telephone visit    Diagnostic AHI:   PSG: 10.9         LEFT VOICE MESSAGE FOR PATIENT TO RETURN CALL      Order settings:  CPAP MIN CPAP MAX   5 cm H2O 15 cm H2O         Device settings:  CPAP MIN CPAP MAX EPR RESMED SOFT RESPONSE SETTING   5 cm  H20 15 cm  H20 2 OFF         Patient has the following upcoming sleep appts:  Future Sleep Appointments         Provider Department    11/4/2024 8:00 AM (Arrive by 7:45 AM) Ashwin Mejia DO [facility] [place]            Replacement device: No  STM ordered by provider: Yes     Total time spent on accessing and  interpreting remote patient PAP therapy data  10 minutes    Total time spent counseling, coaching  and reviewing PAP therapy data with patient  0 minutes

## 2024-11-12 ENCOUNTER — PATIENT OUTREACH (OUTPATIENT)
Dept: CARE COORDINATION | Facility: CLINIC | Age: 49
End: 2024-11-12
Payer: COMMERCIAL

## 2025-01-04 ENCOUNTER — HEALTH MAINTENANCE LETTER (OUTPATIENT)
Age: 50
End: 2025-01-04

## 2025-01-26 ENCOUNTER — LAB (OUTPATIENT)
Dept: LAB | Facility: CLINIC | Age: 50
End: 2025-01-26
Payer: COMMERCIAL

## 2025-01-26 DIAGNOSIS — R73.01 ELEVATED FASTING GLUCOSE: ICD-10-CM

## 2025-01-26 LAB
EST. AVERAGE GLUCOSE BLD GHB EST-MCNC: 131 MG/DL
HBA1C MFR BLD: 6.2 % (ref 0–5.6)

## 2025-01-26 PROCEDURE — 83036 HEMOGLOBIN GLYCOSYLATED A1C: CPT

## 2025-01-26 PROCEDURE — 36415 COLL VENOUS BLD VENIPUNCTURE: CPT

## 2025-04-09 ENCOUNTER — TELEPHONE (OUTPATIENT)
Dept: SLEEP MEDICINE | Facility: CLINIC | Age: 50
End: 2025-04-09
Payer: COMMERCIAL

## 2025-04-09 NOTE — TELEPHONE ENCOUNTER
Patient would like call regarding her cpap and comfort. Patient has rescheduled sleep follow up to 7/29/25.

## 2025-04-10 ENCOUNTER — DOCUMENTATION ONLY (OUTPATIENT)
Dept: SLEEP MEDICINE | Facility: CLINIC | Age: 50
End: 2025-04-10
Payer: COMMERCIAL

## 2025-04-10 NOTE — PROGRESS NOTES
Patient sent message requesting a call about her CPAP. Called patient and left message to call back.

## 2025-05-01 ENCOUNTER — MYC MEDICAL ADVICE (OUTPATIENT)
Dept: OBGYN | Facility: CLINIC | Age: 50
End: 2025-05-01
Payer: COMMERCIAL

## 2025-05-05 ENCOUNTER — OFFICE VISIT (OUTPATIENT)
Dept: OPTOMETRY | Facility: CLINIC | Age: 50
End: 2025-05-05
Payer: COMMERCIAL

## 2025-05-05 DIAGNOSIS — H02.402 PTOSIS OF EYELID, LEFT: ICD-10-CM

## 2025-05-05 DIAGNOSIS — H10.13 ALLERGIC CONJUNCTIVITIS OF BOTH EYES: ICD-10-CM

## 2025-05-05 DIAGNOSIS — E11.9 TYPE 2 DIABETES MELLITUS WITHOUT COMPLICATION, WITHOUT LONG-TERM CURRENT USE OF INSULIN (H): Primary | ICD-10-CM

## 2025-05-05 DIAGNOSIS — H52.4 PRESBYOPIA: ICD-10-CM

## 2025-05-05 DIAGNOSIS — H52.223 REGULAR ASTIGMATISM OF BOTH EYES: ICD-10-CM

## 2025-05-05 DIAGNOSIS — H52.13 MYOPIA OF BOTH EYES: ICD-10-CM

## 2025-05-05 DIAGNOSIS — H04.123 DRY EYE SYNDROME OF BOTH EYES: ICD-10-CM

## 2025-05-05 PROCEDURE — 92015 DETERMINE REFRACTIVE STATE: CPT | Performed by: OPTOMETRIST

## 2025-05-05 PROCEDURE — 92014 COMPRE OPH EXAM EST PT 1/>: CPT | Performed by: OPTOMETRIST

## 2025-05-05 RX ORDER — CYCLOSPORINE 0.5 MG/ML
1 EMULSION OPHTHALMIC 2 TIMES DAILY
Qty: 60 EACH | Refills: 3 | Status: SHIPPED | OUTPATIENT
Start: 2025-05-05 | End: 2026-05-05

## 2025-05-05 RX ORDER — AZELASTINE HYDROCHLORIDE 0.5 MG/ML
1 SOLUTION/ DROPS OPHTHALMIC 2 TIMES DAILY PRN
Qty: 6 ML | Refills: 11 | Status: SHIPPED | OUTPATIENT
Start: 2025-05-05 | End: 2026-05-05

## 2025-05-05 ASSESSMENT — REFRACTION_MANIFEST
OD_SPHERE: -1.50
OD_ADD: +2.00
OS_SPHERE: -2.50
OD_CYLINDER: SPHERE
OS_AXIS: 111
OS_ADD: +2.00
OS_CYLINDER: +0.50

## 2025-05-05 ASSESSMENT — KERATOMETRY
OS_AXISANGLE_DEGREES: 116
OD_K1POWER_DIOPTERS: 42.25
OS_K2POWER_DIOPTERS: 43.25
OD_AXISANGLE_DEGREES: 073
OD_AXISANGLE2_DEGREES: 163
OS_AXISANGLE2_DEGREES: 026
OD_K2POWER_DIOPTERS: 42.75
OS_K1POWER_DIOPTERS: 42.00

## 2025-05-05 ASSESSMENT — CONF VISUAL FIELD
OS_SUPERIOR_TEMPORAL_RESTRICTION: 0
OD_NORMAL: 1
OD_SUPERIOR_NASAL_RESTRICTION: 0
OS_INFERIOR_TEMPORAL_RESTRICTION: 0
OD_INFERIOR_TEMPORAL_RESTRICTION: 0
OS_INFERIOR_NASAL_RESTRICTION: 0
OS_SUPERIOR_NASAL_RESTRICTION: 0
OD_INFERIOR_NASAL_RESTRICTION: 0
OS_NORMAL: 1
OD_SUPERIOR_TEMPORAL_RESTRICTION: 0

## 2025-05-05 ASSESSMENT — CUP TO DISC RATIO
OS_RATIO: 0.5
OD_RATIO: 0.5

## 2025-05-05 ASSESSMENT — VISUAL ACUITY
OD_SC+: -2
OD_SC: 20/30
OS_SC: 20/25
OS_SC: 20/70
OS_PH_SC+: -1
METHOD: SNELLEN - LINEAR
OD_SC: 20/25-1
OS_PH_SC: 20/20

## 2025-05-05 ASSESSMENT — EXTERNAL EXAM - RIGHT EYE: OD_EXAM: NORMAL

## 2025-05-05 ASSESSMENT — REFRACTION_WEARINGRX
OS_SPHERE: -2.25
OD_SPHERE: -1.50
SPECS_TYPE: DISTANCE
OS_AXIS: 111
OS_CYLINDER: +0.50

## 2025-05-05 ASSESSMENT — TONOMETRY
IOP_METHOD: APPLANATION
OS_IOP_MMHG: 15
OD_IOP_MMHG: 15

## 2025-05-05 ASSESSMENT — EXTERNAL EXAM - LEFT EYE: OS_EXAM: NORMAL

## 2025-05-05 NOTE — PATIENT INSTRUCTIONS
There are not any signs of the diabetes affecting the eyes today.  It is important that you get your eyes dilated once yearly and keep good control of your diabetes.    Optivar- 1 drop both eyes 2 x day as needed for itchy eyes.    Heat to the eyes daily for 10-15 minutes nightly with warm washcloth or reusable gel masks from the pharmacy or  Christine heat masks can be purchased at [employer].    Restasis- 1 drop both eyes once in the am and once in the pm.    Non preserved artificial tears as needed during the day.    Eyeglass prescription given.    Return in 1 year for a complete eye exam or sooner if needed.    Osmar Hill, OD    The affects of the dilating drops last for 4- 6 hours.  You will be more sensitive to light and vision will be blurry up close.  Do not drive if you do not feel comfortable.  Mydriatic sunglasses were given if needed.    Patient Education   Diabetes weakens the blood vessels all over the body, including the eyes. Damage to the blood vessels in the eyes can cause swelling or bleeding into part of the eye (called the retina). This is called diabetic retinopathy (ZANE-tin--pu-thee). If not treated, this disease can cause vision loss or blindness.   Symptoms may include blurred or distorted vision, but many people have no symptoms. It's important to see your eye doctor regularly to check for problems.   Early treatment and good control can help protect your vision. Here are the things you can do to help prevent vision loss:      1. Keep your blood sugar levels under tight control.      2. Bring high blood pressure under control.      3. No smoking.      4. Have yearly dilated eye exams.     There is a combination of three treatments which can greatly improve symptoms of dry eyes.     Artificial tears  Heat (eyes closed)  Eyelid and eyelash cleansing (eyes closed)     Use one drop of artificial tears both eyes 4 x daily.  Once in the morning, lunch, dinner and bedtime. Continue to use the drops  regardless if your eyes are comfortable or not.  Artificial tears work best as a preventative and not as well after your eyes are starting to bother you.  It may take 4- 6 weeks of using the drops before you notice improvement.  If after that time you are still having problems schedule an appointment for an evaluation and discussion of different treatments such as Restasis or Xiidra.  Dry eyes are a chronic condition and you may have more symptoms at certain times of the year.    Excess tearing can be due to the right tears not working properly or a blockage in the tear drainage system.  You can try using artificial tears 1 drop both eyes 4 x day.  If the excess tearing is bothersome after 4-6 weeks of treatment then we can send you for further testing.  This would entail a referral to our oculoplastic specialist Dr. Laura Hinson at the [facility]-[phone].    Recommended brands are:    Systane Complete  Systane Ultra  Systane Balance  Refresh Advanced Optive  Refresh Relieva  Blink    Recommended brands for contact lens wearers are:    Systane contacts  Refresh contacts  Blink contacts    If you are using drops more than 4 x day or have sensitivities to preservatives I recommend non preserved artificial tears.  These come in 1 use vials.  They can be used every 1-2 hours.  Do not reuse the vials.    Recommended brands are:    Refresh Optive Terry-3  Systane- preservative free  Refresh-  preservative free  Blink- preservative free    Gels or ointment can be used at night.    Recommended brands are:    Systane Gel  Refresh Gel  Blink Gel  Genteal Gel    Systane night time (ointment)  Refresh Celluvisc  Refresh PM (ointment)    Optase dry eye spray.  Spray to eyelids 3-4 x daily.  This can be purchased on Amazon.      Visine, Clear Eyes or Murine (drops that get the red out) can irritate the eyes and cause a rebound effect where the eyes become more red and you end up using more drops.  Avoid drops  containing tetrahydrozoline, naphazoline, phenylephrine, oxymetazoline.      OTC Lumify is a newer product that gives immediate redness relief without the rebound effect.  Use as needed to take the redness out.    Artificial tears may be used with other drops (such as allergy, glaucoma, antibiotics) around the same time.  Be sure to wait 5 minutes in between drops.    Heat to the eyelids can also improve your symptoms of dry eyes.  Christine heat masks can be purchased at [employer] to be used nightly for 10-15 minutes.  Other options are gel masks that can be put in the microwave and purchased at most pharmacies.      Tea Tree Oil eyelid cleansers recommended are Ocusoft Oust foam cleanser to cleanse eyelids/lashes at night and in the am. Other options are Blephadex or Cliradex eyelid wipes.  KEEP EYES CLOSED when using these products.  These can be purchased on amazon.com   A good product for make up remover with tea tree oil is WeLoveEyes.  This can be found at www.[employer] or [employer].    Other good eyelid cleansers have hypochlorous which removes excess bacteria and is safe around the eyes. Products are Avenova, Ocusoft Hypochlor or Heyedrate. Spray solution onto cotton pad, close eyes and gently apply to eyelids and eyelashes using side to side motion.  You can also KEEP EYES CLOSED spray and rub into eyelashes.  You do not need to rinse it off. Use morning and evening. These products can be found on Amazon.  You can check with your local pharmacy and see if they can order if for you if they don't have it.    Other brands of eyelid cleansing wipes are:    Ocusoft wipes  Systane wipes    A great eye make up line is [email or web address]                      Optometry Providers       Clinic Locations                                 Telephone Number   Dr. Sabiha Campos     [facility]   [facility]  [facility] [phone]     [facility] Hours:                [place] [facility] Hours:       [facility] Hours:   [address]   [address]     [address] [place] [address]   P[place]DA Sl[place] [address]    [place] [address]  Phone: [phone]                    Phone: [phone]     Phone: [phone]                      Monday 8:00-6:00                          Monday 8:00-6:00                          Monday 8:00-6:00              Tuesday 8:00-6:00                          Tuesday 8:00-6:00                          Tuesday 8:00-6:00              Wednesday 8:00-6:00                  Wednesday 8:00-6:00                   Wednesday 8:00-6:00      Thursday 8:00-6:00                        Thursday 8:00-6:00                         Thursday 8:00-6:00            Friday 8:00-5:00                              Friday 8:00-5:00                              Friday 8:00-5:00    [facility] Hours:   [address] [place] [address]  [phone]    Monday 9:00-6:00  Tuesday 9:00-6:00  Wednesday 9:00-6:00  Thursday 9:00-6:00  Friday 9:00-5:00  As always, Thank you for trusting us with your health care needs!

## 2025-05-05 NOTE — PROGRESS NOTES
Chief Complaint   Patient presents with    Diabetic Eye Exam        Chief Complaint(s) and History of Present Illness(es)       Diabetic Eye Exam              Diabetes Type: Type 2 and controlled with diet    Duration: 2 years    Blood Sugars: is controlled                   Lab Results   Component Value Date    A1C 6.2 01/26/2025    A1C 5.8 05/07/2024    A1C 5.9 05/16/2023    A1C 6.0 03/01/2023    A1C 6.2 04/28/2021    A1C 6.5 04/02/2021    A1C 5.6 12/21/2018       Last Eye Exam: 3-  Dilated Previously: Yes    What are you currently using to see?  does not use glasses or contacts    Distance Vision Acuity: Noticed gradual change in both eyes,driving at night getting worse    Near Vision Acuity: Satisfied with vision while reading  unaided    Eye Comfort: itchy with allergies - watery left eye   Do you use eye drops? : Yes: dry eye drop and had rx for allergy drop  Occupation or Hobbies:     Left eyelid has been droopy for a long time- seems to be getting worse-  No fatigue with chewing, double vision.  Negative muscle weakness.    Pearl Hernandez Optometric Assistant, A.B.O.C.     Medical, surgical and family histories reviewed and updated 5/5/2025.       OBJECTIVE: See Ophthalmology exam    ASSESSMENT:    ICD-10-CM    1. 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 Allergic conjunctivitis of both eyes  H10.13 EYE EXAM (SIMPLE-NONBILLABLE)     azelastine (OPTIVAR) 0.05 % ophthalmic solution      3. Dry eye syndrome of both eyes  H04.123 EYE EXAM (SIMPLE-NONBILLABLE)     cycloSPORINE (RESTASIS) 0.05 % ophthalmic emulsion      4. Ptosis of eyelid, left  H02.402 Adult Eye  Referral      5. Myopia of both eyes  H52.13 REFRACTION      6. Presbyopia  H52.4 REFRACTION      7. Regular astigmatism of both eyes  H52.223 REFRACTION          PLAN:    Berhane Murray  aware  eye exam results will be sent to Shabana Toribio.  Patient Instructions   There are not any signs of the diabetes affecting the eyes today.  It is important that you get your eyes dilated once yearly and keep good control of your diabetes.    Optivar- 1 drop both eyes 2 x day as needed for itchy eyes.    Heat to the eyes daily for 10-15 minutes nightly with warm washcloth or reusable gel masks from the pharmacy or  [employer] Leaning heat masks can be purchased at [employer].    Restasis- 1 drop both eyes once in the am and once in the pm.    Non preserved artificial tears as needed during the day.    Eyeglass prescription given.    Return in 1 year for a complete eye exam or sooner if needed.    Osmar Hill, OD

## 2025-05-05 NOTE — LETTER
5/5/2025      Berhane Murray  [id number]  [place] [address]      Dear Colleague,    Thank you for referring your patient, Berhane Murray, to the [facility]. Please see a copy of my visit note below.    Chief Complaint   Patient presents with     Diabetic Eye Exam        Chief Complaint(s) and History of Present Illness(es)       Diabetic Eye Exam              Diabetes Type: Type 2 and controlled with diet    Duration: 2 years    Blood Sugars: is controlled                   Lab Results   Component Value Date    A1C 6.2 01/26/2025    A1C 5.8 05/07/2024    A1C 5.9 05/16/2023    A1C 6.0 03/01/2023    A1C 6.2 04/28/2021    A1C 6.5 04/02/2021    A1C 5.6 12/21/2018       Last Eye Exam: 3-  Dilated Previously: Yes    What are you currently using to see?  does not use glasses or contacts    Distance Vision Acuity: Noticed gradual change in both eyes,driving at night getting worse    Near Vision Acuity: Satisfied with vision while reading  unaided    Eye Comfort: itchy with allergies - watery left eye   Do you use eye drops? : Yes: dry eye drop and had rx for allergy drop  Occupation or Hobbies:     Left eyelid has been droopy for a long time- seems to be getting worse-  No fatigue with chewing, double vision.  Negative muscle weakness.    Pearl Hernandez Optometric Assistant, A.B.O.C.     Medical, surgical and family histories reviewed and updated 5/5/2025.       OBJECTIVE: See Ophthalmology exam    ASSESSMENT:    ICD-10-CM    1. 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 Allergic conjunctivitis of both eyes  H10.13 EYE EXAM (SIMPLE-NONBILLABLE)     azelastine (OPTIVAR) 0.05 % ophthalmic solution      3. Dry eye syndrome of both eyes  H04.123 EYE EXAM (SIMPLE-NONBILLABLE)     cycloSPORINE (RESTASIS) 0.05 % ophthalmic emulsion      4.  Ptosis of eyelid, left  H02.402 Adult Eye  Referral      5. Myopia of both eyes  H52.13 REFRACTION      6. Presbyopia  H52.4 REFRACTION      7. Regular astigmatism of both eyes  H52.223 REFRACTION          PLAN:    Berhane Murray aware  eye exam results will be sent to Shabana Toribio.  Patient Instructions   There are not any signs of the diabetes affecting the eyes today.  It is important that you get your eyes dilated once yearly and keep good control of your diabetes.    Optivar- 1 drop both eyes 2 x day as needed for itchy eyes.    Heat to the eyes daily for 10-15 minutes nightly with warm washcloth or reusable gel masks from the pharmacy or  [employer] heat masks can be purchased at [employer].    Restasis- 1 drop both eyes once in the am and once in the pm.    Non preserved artificial tears as needed during the day.    Eyeglass prescription given.    Return in 1 year for a complete eye exam or sooner if needed.    Osmar Hill, OD               Again, thank you for allowing me to participate in the care of your patient.        Sincerely,        Osmar Hill, OD    Electronically signed

## 2025-05-06 ENCOUNTER — PATIENT OUTREACH (OUTPATIENT)
Dept: CARE COORDINATION | Facility: CLINIC | Age: 50
End: 2025-05-06
Payer: COMMERCIAL

## 2025-05-08 ENCOUNTER — PATIENT OUTREACH (OUTPATIENT)
Dept: CARE COORDINATION | Facility: CLINIC | Age: 50
End: 2025-05-08
Payer: COMMERCIAL

## 2025-05-10 ENCOUNTER — HEALTH MAINTENANCE LETTER (OUTPATIENT)
Age: 50
End: 2025-05-10

## 2025-05-13 ENCOUNTER — VIRTUAL VISIT (OUTPATIENT)
Dept: FAMILY MEDICINE | Facility: CLINIC | Age: 50
End: 2025-05-13
Payer: COMMERCIAL

## 2025-05-13 DIAGNOSIS — Z13.220 SCREENING FOR HYPERLIPIDEMIA: ICD-10-CM

## 2025-05-13 DIAGNOSIS — R73.03 PRE-DIABETES: ICD-10-CM

## 2025-05-13 DIAGNOSIS — E66.01 CLASS 2 SEVERE OBESITY DUE TO EXCESS CALORIES WITH SERIOUS COMORBIDITY AND BODY MASS INDEX (BMI) OF 37.0 TO 37.9 IN ADULT (H): Primary | ICD-10-CM

## 2025-05-13 DIAGNOSIS — E66.812 CLASS 2 SEVERE OBESITY DUE TO EXCESS CALORIES WITH SERIOUS COMORBIDITY AND BODY MASS INDEX (BMI) OF 37.0 TO 37.9 IN ADULT (H): Primary | ICD-10-CM

## 2025-05-13 DIAGNOSIS — R60.9 EDEMA, UNSPECIFIED TYPE: ICD-10-CM

## 2025-05-13 PROCEDURE — 98006 SYNCH AUDIO-VIDEO EST MOD 30: CPT | Performed by: FAMILY MEDICINE

## 2025-05-13 PROCEDURE — 1126F AMNT PAIN NOTED NONE PRSNT: CPT | Mod: 95 | Performed by: FAMILY MEDICINE

## 2025-05-13 NOTE — PROGRESS NOTES
"Berhane is a 49 year old who is being evaluated via a billable video visit.    How would you like to obtain your AVS? MyChart  If the video visit is dropped, the invitation should be resent by: Text to cell phone: [phone]  Will anyone else be joining your video visit? No      Assessment & Plan     Class 2 severe obesity due to excess calories with serious comorbidity and body mass index (BMI) of 37.0 to 37.9 in adult (H)  She is interested in medical management of her obesity and I have placed a referral for the weight management clinic   We will check CMP   - Adult Comprehensive Weight Management  Referral; Future  - Comprehensive metabolic panel (BMP + Alb, Alk Phos, ALT, AST, Total. Bili, TP); Future    Pre-diabetes  Her last hgb A1 c was 6.2 from January of this year , will recheck   - Hemoglobin A1c; Future    Edema, unspecified type  She has had some ankles edema roberto carlos, no injuries  we discussed checking TSH and CBC and CMP as well   - TSH with free T4 reflex; Future  - CBC with platelets; Future    Screening for hyperlipidemia  She will schedule a fasting lab so we can check lipids as well   - Lipid panel reflex to direct LDL Fasting; Future      RTC if no improving or worsening.  Pt is aware  and comfortable with the current plan.      BMI  Estimated body mass index is 37.48 kg/m  as calculated from the following:    Height as of 7/19/24: 1.6 m (5' 3\").    Weight as of 7/19/24: 96 kg (211 lb 9.6 oz).         RTC if no improving or worsening.  Pt is aware  and comfortable with the current plan.      Subjective   Berhane is a 49 year old, presenting for the following health issues:  Weight Management    HPI      Would like to try Ozempic for weight loss also states she is borderline diabetic          Review of Systems  Constitutional, HEENT, cardiovascular, pulmonary, GI, , musculoskeletal, neuro, skin, endocrine and psych systems are negative, except as otherwise noted.      Objective    Vitals - " Patient Reported  Weight (Patient Reported): 102.1 kg (225 lb)  Pain Score: No Pain (0)        Physical Exam   GENERAL: alert and no distress  EYES: Eyes grossly normal to inspection.  No discharge or erythema, or obvious scleral/conjunctival abnormalities.  RESP: No audible wheeze, cough, or visible cyanosis.    SKIN: Visible skin clear. No significant rash, abnormal pigmentation or lesions.  NEURO: Cranial nerves grossly intact.  Mentation and speech appropriate for age.  PSYCH: Appropriate affect, tone, and pace of words    No results found for this or any previous visit (from the past 24 hours).      Video-Visit Details    Type of service:  Video Visit   Originating Location (pt. Location): Home    Distant Location (provider location):  On-site  Platform used for Video Visit: Kunal  Signed Electronically by: Shabana Toribio MD

## 2025-05-14 NOTE — PATIENT INSTRUCTIONS
Welcome to [facility]!      We are grateful that you have chosen us to partner with you on your journey to better health!  We have included some very important information for you to read as you begin your journey towards weight loss surgery. We will discuss parts of this as you move closer to surgery but please reach out if you have any questions or concerns.      Please click on the following links and they will lead you to a printable version of each handout or copy into your browser to view/print at home:    Making Your Decision, Understanding Weight Loss Surgery  [email or web address]    A Roadmap to Your Weight Loss Surgery  [email or web address]    Honoring Choices - Your Rights  [email or web address]    Honoring Choices - [place] Health Care Directive (form that can be filled out)  [email or web address]      Insurance Coverage and Approval for Surgery  Every insurance plan is different.  Many companies approve benefits for surgery, but many have specific requirements that must be completed prior to being approved.    Verification of benefits is the patient's responsibility.  You will be responsible for any charges not covered by your insurance plan - including, but not limited to copays, deductible, out of pocket, any amount over your cap limit, etc.  Using the guideline below, please contact your insurance plan (we recommend you call the Customer Service number on the back of your card).      Once all of the requirements for surgery are complete, you will see the surgeon.  Following this visit, we will submit your information to your insurance plan for Prior Authorization.  We strongly encourage you to submit any documentation that supports your weight loss attempts to us.    Questions that are important to ask your insurance company when you call them:    Are [facility] in my network?  Is bariatric surgery  a covered benefit under my policy?  If so, what is my estimated out of pocket expense?  Are there any exclusions or cap limits on my plan for bariatric surgery?  If so, what are they?  What are the criteria necessary to be approved for bariatric surgery?  Do you require medically supervised weight loss visits for approval of surgery?  If yes, for how many months?  Within the last # of years?  Are the following procedures a covered benefit under my plan?  Laparoscopic Gastric Bypass (CPT Code 24026)  Laparoscopic Sleeve Gastrectomy (CPT Code 85921)  Nutrition/Dietitian Consult (CPT Code 51908  Nutrition/Dietitian Reassessment (CPT Code 71248  Psychological Assessment (CPT Codes 67632 & 22555      Initial labs for Bariatric Surgery    Some lab orders were placed by your doctor in the [facility] system and can be drawn at any of our outpatient hospital labs or your clinic. If you do them at one of three hospitals ([facility] in [place], [facility] in [place], [facility] in [place]) you do not need an appointment but if you'd like to do them at a clinic, you will need to schedule an appointment with that clinic.       You will need to be fasting 10-12 hours prior (you can continue to drink water) and should have them drawn sooner verses later so if any corrections need to be made we will have time to address them.      [facility]'s lab is open 7 am - 4:30 pm Monday through Friday and 9am-12:30 pm on Saturdays.  [facility]'s lab is open 7 am -4:30 pm Monday through Friday and 8am to 11:30am on Saturday and Sundays.     If you would like them done at a clinic outside the [facility] system, then we will need to know where to fax the orders.   If you have any questions or would like help scheduling a lab appointment at the [facility], please give us a call on the Bariatric Nurse Line at [phone].        [place] to Success for Bariatric Surgery  Eat 3 nutrient-rich meals each day      Don't skip meals--it will cause you to overeat later in the day! Space meals 4-6 hours apart    Eat around the same times each day to develop a routine eating schedule    Avoid snacking unless physically hungry.   Planned snacks: 1-2 times per day and no more than 150 calories    Eating protein and fiber (vegetables/fruits/whole grains) with meals helps you stay full     Choose foods with less than 10 grams of sugar and 5-10 grams of fat per serving to prevent excess calories and weight gain  Eat protein first    Protein helps with healing, maintaining muscle mass and good nutrition, and reducing hunger     For RNY Gastric Bypass, Sleeve Gastrectomy, and Duodenal Switch: aim for 60-80 grams of protein per day    Eat protein first, then veggies and the fruits or grains  Stop drinking 15-30 minutes before meals and wait 30-45 minutes after eating to drink    Drinking too soon before a meal may cause you to be too full to eat    Drinking too soon after you eat will cause the food to  wash  through your new stomach too quickly--leaving you hungry and likely to eat more    Eat S-[id number]    Take 20-30 minutes to eat each meal by taking small bites, chewing foods to applesauce consistency or 20-30 times before you swallow    Not chewing food properly can block the opening of your pouch--causing pain, nausea, vomiting    Eating foods too fast can delay those full signals and increase overeating   Slow down your eating by smaller plates/bowls, toddler utensils, putting your fork/spoon down between bites and not watching TV/computer during meals!  Make a list of activities to prevent boredom, stress and emotional eating    Go for a walk or lift weights while watching your favorite TV show    Talk to a co-worker or email/call a friend     Keep your hands and mind busy with woodworking, puzzles, knitting or painting your nails    Practice deep breathing or meditation  Drink 64 ounces of 0-Calorie drinks between meals      Water    Zero calorie Propel , Vitamin Water Zero  or [employer]ater , Crystal Light , Sugar-Free Hood-Aid , and other sugar-free lemonade or flavored mcnulty    Decaffeinated, unsweetened coffee/ tea (1 cup or less per day)   Avoid Caffeine and Carbonation- NO STRAWS    Caffeine can irritate your new pouch, increase risk for ulcers, and can increase your appetite      Carbonation can lead to increased bloating/gas and discomfort   Work up to a total of 30-60 minutes of physical activity most days of the week    Helps with losing weight and preventing weight regain after surgery     Do a combination of cardio (walking/water exercises/biking/swimming) and strength training  Take daily vitamin/mineral supplements after surgery    Daily use of vitamins/minerals is necessary for the rest of your life      Psychological Evaluation for Bariatric Surgery      Why do I need a psychological evaluation before bariatric surgery?     The psychologist's main purpose is to provide the support and education to ensure you have the most successful outcome after surgery.   Preparing for bariatric surgery often involves changing behavior patterns. Working on these changes before surgery allows you to achieve the best results after surgery as some of these behaviors have been entrenched for many years. In addition, your relationship with food may need to change. Psychologists are experts in behavior change and are there to guide and support you as you make these important changes.   A significant life change, like losing a lot of weight, may affect many areas of your life--self-concept, physical activity and relationships with others. Your psychologist will help you prepare to adjust to these changes in a healthy way.   If you have mental health symptoms, relationship struggles, or other challenges, your psychologist will suggest how to best address these concerns so that they do not interfere with your progress toward a healthier  lifestyle.       Is the psychologist looking for reasons to say I can't have surgery?   The goal is to not find specific psychological problems. Instead, the psychologist will be working with your strengths to ensure you have the most optimal outcome after surgery.  There is no expectation that you will have everything figured out already or that you must have  perfect  behaviors before moving forward with surgery. Your psychologist is expecting that you will have some behavior changes that will need to occur concurrent with the surgery.   You can feel comfortable that the psychologist is not there to    you or your habits. The psychologist is there to provide support and encourage your progress.      What should I expect during the evaluation?   During the interview, which could take place over 2 or more sessions, the psychologist will ask about:   -weight history, current eating pattern and fluid intake, and previous attempts at weight loss   -activity level   -psychiatric history  -drug, alcohol and nicotine use   -social and developmental history  -support system   -current stressors and coping mechanisms   -understanding of the risks of surgery   -expectations for outcomes after surgery   You will complete various questionnaires that will focus on coping strategies, eating behaviors, quality of life and adverse childhood experiences in order to provide additional information to inform the assessment.   Your psychologist will likely give you some  homework assignments  to practice as you prepare for surgery. This will be individually tailored to your specific needs.   Your psychologist will talk with you about some of the typical challenges that patients might encounter after surgery and how to prepare for these.   A final report will be generated and any treatment recommendations will be discussed with you and the bariatric team to determine next steps.   If you would like, you may have any support people  (e.g., spouse) join a session of the evaluation to provide additional information.       Bariatric surgery offers a physical  tool  for weight management. Similarly, your work with the psychologist will provide you with some additional emotional and behavioral  tools  as you work toward your healthier lifestyle goals.      Support Group    Support and accountability is an important part of your weight loss journey and maintenance once you reach your health goals.  Because of this, we require that you attend at least one of our support groups before you meet with the surgeon so you get a feel for what they are like and hopefully establish a connection to others who are going through a similar process or have had surgery before so you can ask your questions.    We currently have two options for support group but they are in the virtual format only at this time.  Both groups are using Microsoft Teams for their platform and you can access it through the web or an alysha that can be downloaded to a smart phone if you have one.      The Pre- and Post-op Connections Group is on the third Tuesday of the month from 6:30-8pm and is hosted by Moises Alvarez, PhD.  If you are interested in this group, you will need to email him at [email or web address] each month and then he will in turn send you the invitation to join.      We also have a Post-op focused Connections Group the 4th Thursday of the month from 11am-12pm that is mostly geared toward post-operative patients who are past three months post-op.  This group is hosted by YAZMIN Alcantar, CBN, CIC and you can email her to join the group at [email or web address] each month and she will send you an invite similar to Moises's group.  If you decide you would like to be a regular attender at this group, we can add you to an automatic invitation list of people.    You can see more information about available support groups that the [facility] offers to our patients  as well by following the link:    The following Support Group information can also be found on our website:  [email or web address]      Please let us know if you have any questions about all the information above and we will be talking more about this during future visits.     Thank you,   [facility] Bariatric Team

## 2025-05-15 ENCOUNTER — LAB (OUTPATIENT)
Dept: LAB | Facility: CLINIC | Age: 50
End: 2025-05-15
Payer: COMMERCIAL

## 2025-05-15 ENCOUNTER — OFFICE VISIT (OUTPATIENT)
Dept: SURGERY | Facility: CLINIC | Age: 50
End: 2025-05-15
Payer: COMMERCIAL

## 2025-05-15 ENCOUNTER — RESULTS FOLLOW-UP (OUTPATIENT)
Dept: FAMILY MEDICINE | Facility: CLINIC | Age: 50
End: 2025-05-15

## 2025-05-15 VITALS
SYSTOLIC BLOOD PRESSURE: 112 MMHG | BODY MASS INDEX: 40.4 KG/M2 | HEIGHT: 63 IN | DIASTOLIC BLOOD PRESSURE: 64 MMHG | WEIGHT: 228 LBS

## 2025-05-15 DIAGNOSIS — Z00.00 HEALTHCARE MAINTENANCE: ICD-10-CM

## 2025-05-15 DIAGNOSIS — E66.01 CLASS 2 SEVERE OBESITY DUE TO EXCESS CALORIES WITH SERIOUS COMORBIDITY AND BODY MASS INDEX (BMI) OF 37.0 TO 37.9 IN ADULT (H): ICD-10-CM

## 2025-05-15 DIAGNOSIS — Z12.12 SCREENING FOR COLORECTAL CANCER: ICD-10-CM

## 2025-05-15 DIAGNOSIS — E66.813 OBESITY, CLASS III, BMI 40-49.9 (MORBID OBESITY) (H): ICD-10-CM

## 2025-05-15 DIAGNOSIS — R73.03 PRE-DIABETES: ICD-10-CM

## 2025-05-15 DIAGNOSIS — E66.812 CLASS 2 SEVERE OBESITY DUE TO EXCESS CALORIES WITH SERIOUS COMORBIDITY AND BODY MASS INDEX (BMI) OF 37.0 TO 37.9 IN ADULT (H): ICD-10-CM

## 2025-05-15 DIAGNOSIS — R60.9 EDEMA, UNSPECIFIED TYPE: ICD-10-CM

## 2025-05-15 DIAGNOSIS — Z12.11 SCREENING FOR COLORECTAL CANCER: ICD-10-CM

## 2025-05-15 DIAGNOSIS — Z13.220 SCREENING FOR HYPERLIPIDEMIA: ICD-10-CM

## 2025-05-15 DIAGNOSIS — E66.813 OBESITY, CLASS III, BMI 40-49.9 (MORBID OBESITY) (H): Primary | ICD-10-CM

## 2025-05-15 LAB
ALBUMIN SERPL BCG-MCNC: 4 G/DL (ref 3.5–5.2)
ALP SERPL-CCNC: 120 U/L (ref 40–150)
ALT SERPL W P-5'-P-CCNC: 23 U/L (ref 0–50)
ANION GAP SERPL CALCULATED.3IONS-SCNC: 10 MMOL/L (ref 7–15)
AST SERPL W P-5'-P-CCNC: 26 U/L (ref 0–45)
BILIRUB SERPL-MCNC: 0.2 MG/DL
BUN SERPL-MCNC: 11.8 MG/DL (ref 6–20)
CALCIUM SERPL-MCNC: 9.4 MG/DL (ref 8.8–10.4)
CHLORIDE SERPL-SCNC: 107 MMOL/L (ref 98–107)
CHOLEST SERPL-MCNC: 172 MG/DL
CREAT SERPL-MCNC: 0.83 MG/DL (ref 0.51–0.95)
EGFRCR SERPLBLD CKD-EPI 2021: 85 ML/MIN/1.73M2
ERYTHROCYTE [DISTWIDTH] IN BLOOD BY AUTOMATED COUNT: 15.2 % (ref 10–15)
EST. AVERAGE GLUCOSE BLD GHB EST-MCNC: 134 MG/DL
FASTING STATUS PATIENT QL REPORTED: YES
FASTING STATUS PATIENT QL REPORTED: YES
FERRITIN SERPL-MCNC: 90 NG/ML (ref 6–175)
FOLATE SERPL-MCNC: 4.9 NG/ML (ref 4.6–34.8)
GLUCOSE SERPL-MCNC: 108 MG/DL (ref 70–99)
HBA1C MFR BLD: 6.3 % (ref 0–5.6)
HCO3 SERPL-SCNC: 25 MMOL/L (ref 22–29)
HCT VFR BLD AUTO: 35.1 % (ref 35–47)
HDLC SERPL-MCNC: 38 MG/DL
HGB BLD-MCNC: 11.3 G/DL (ref 11.7–15.7)
LDLC SERPL CALC-MCNC: 119 MG/DL
MCH RBC QN AUTO: 27.1 PG (ref 26.5–33)
MCHC RBC AUTO-ENTMCNC: 32.2 G/DL (ref 31.5–36.5)
MCV RBC AUTO: 84 FL (ref 78–100)
NONHDLC SERPL-MCNC: 134 MG/DL
PLATELET # BLD AUTO: 259 10E3/UL (ref 150–450)
POTASSIUM SERPL-SCNC: 4.2 MMOL/L (ref 3.4–5.3)
PROT SERPL-MCNC: 7.3 G/DL (ref 6.4–8.3)
PTH-INTACT SERPL-MCNC: 82 PG/ML (ref 15–65)
RBC # BLD AUTO: 4.17 10E6/UL (ref 3.8–5.2)
SODIUM SERPL-SCNC: 142 MMOL/L (ref 135–145)
TRIGL SERPL-MCNC: 74 MG/DL
TSH SERPL DL<=0.005 MIU/L-ACNC: 0.74 UIU/ML (ref 0.3–4.2)
VIT B12 SERPL-MCNC: 278 PG/ML (ref 232–1245)
WBC # BLD AUTO: 9 10E3/UL (ref 4–11)

## 2025-05-15 NOTE — LETTER
"5/15/2025      Berhane Murray  [address]  [place] [address]      Dear Colleague,    Thank you for referring your patient, Berhane Murray, to the [facility] [place]. Please see a copy of my visit note below.    New Bariatric Surgery Consultation Note    May 14, 2025    RE: Berhane Murray  MR#: [id number]  : 1975      Referring provider:       2025    11:00 PM   --   Who referred you Dr. Shabana Pat       Chief Complaint/Reason for visit: evaluation for possible weight loss surgery    Dear Shabana Toribio MD (General),    I had the pleasure of seeing your patient, Berhane Murray, to evaluate her obesity and consider her for possible weight loss surgery. As you know, Berhane Murray is 49 year old.  She has a height of 5' 3\", a weight of 228 lbs 0 oz, and calculated Body mass index is 40.39 kg/m .        HISTORY OF PRESENT ILLNESS:      2025    11:00 PM   Weight Loss History Reviewed with Patient   How long have you been overweight? Since late 20's to early 40's   What is the most that you have ever weighed 225   What is the most weight you have lost? 15   I have tried the following methods to lose weight Watching portions or calories    Exercise    Weight Watchers    Pre packaged meals ex: Nutrisystem   I have tried the following weight loss medications? (Check all that apply) None       CO-MORBIDITIES OF OBESITY INCLUDE:      2025    11:00 PM   --   I have the following health issues associated with obesity Type II Diabetes    Pre-Diabetes    Sleep Apnea   No GERD    PAST MEDICAL HISTORY:  Past Medical History:   Diagnosis Date     Back pain      Bilateral knee pain      Cervical high risk HPV (human papillomavirus) test positive 2017, 21     Diabetes (H)     diet controlled     Nonsenile cataract May 2025     MIKE (obstructive sleep apnea)      Peripheral edema        PAST SURGICAL HISTORY:  Past Surgical History:   Procedure " Laterality Date      SECTION      2009     COLONOSCOPY         FAMILY HISTORY:   Family History   Problem Relation Age of Onset     Cancer Mother      Breast Cancer Mother      Hypertension Father      Diabetes Father      Glaucoma Paternal Grandmother      Diabetes Paternal Grandmother      Diabetes Sister        SOCIAL HISTORY:       2025    11:00 PM   Social History Questions Reviewed With Patient   Which best describes your employment status (select all that apply) I work full-time   If you work, what is your occupation?    Which best describes your marital status    Who do you have in your support network that can be available to help you for the first 2 weeks after surgery? Spouse or mother   Who can you count on for support throughout your weight loss surgery journey? Spouse       HABITS:      2025    11:00 PM   --   How often do you drink alcohol? 2-4 times a month   If you do drink alcohol, how many drinks might you have in a day? (one drink = 5 oz. wine, 1 can/bottle of beer, 1 shot liquor) 1 or 2   Do you currently use any of the following Nicotine products? No   Have you ever used any of the following nicotine products? No   Have you or are you currently using street drugs or prescription strength medication for which you do not have a prescription for? No   Do you have a history of chemical dependency (alcohol or drug abuse)? No       PSYCHOLOGICAL HISTORY:       2025    11:00 PM   Psychological History Reviewed With Patient   Have you ever attempted suicide? Never.   Have you had thoughts of suicide in the past year? No   Have you ever been hospitalized for mental illness or a suicide attempt? Never.   Do you have a history of chronic pain? No   Have you ever been diagnosed with fibromyalgia? No   Are you currently being treated for any of the following? (select all that apply) I do not have a mental illness       ROS:      2025    11:00 PM   --  "  Skin None of the above   HEENT Missing teeth   If you answered yes to missing teeth, please indicate how many 1   Musculoskeletal Back pain    Swelling of legs   Cardiovascular None of the above   Pulmonary Snoring    Awaken from sleep to catch your breath    People have told me I stop breathing while asleep    Experience morning headaches   Gastrointestinal None of the above   Genitourinary None of the above   Hematological None of the above   Neurological None of the above   Female only Loss of menstrual cycles    Polycystic ovarian syndrome (PCOS)    Post-menopausal       EATING BEHAVIORS:      5/14/2025    11:00 PM   --   Have you or anyone else thought that you had an eating disorder? No   Do you currently binge eat (eat a large amount of food in a short time)? No   Are you an emotional eater? No   Do you get up to eat after falling asleep? No   Can you afford 3 meals a day? Yes   Can you afford 50-60 dollars a month for vitamins? Yes       EXERCISE:      5/14/2025    11:00 PM   --   How often do you exercise? 1 to 2 times per week   What is the duration of your exercise (in minutes)? 15 Minutes   What types of exercise do you do? walking   What keeps you from being more active? Pain    Too tired       MEDICATIONS:  Current Outpatient Medications   Medication Sig Dispense Refill     albuterol (PROAIR HFA/PROVENTIL HFA/VENTOLIN HFA) 108 (90 Base) MCG/ACT inhaler Inhale 2 puffs into the lungs every 6 hours 18 g 3     azelastine (OPTIVAR) 0.05 % ophthalmic solution Place 1 drop into both eyes 2 times daily as needed (for itchy eyes). 6 mL 11     cycloSPORINE (RESTASIS) 0.05 % ophthalmic emulsion Place 1 drop into both eyes 2 times daily. 60 each 3       ALLERGIES:  Allergies   Allergen Reactions     Prednisone      Fainted after competing full dose.        LABS/IMAGING/MEDICAL RECORDS REVIEW: ordered    PHYSICAL EXAM:  /64 (BP Location: Right arm, Patient Position: Sitting)   Ht 1.6 m (5' 3\")   Wt 103.4 " kg (228 lb)   LMP  (LMP Unknown)   BMI 40.39 kg/m    General Appearance  No acute distress. Obesity   Alert and Oriented   Ambulatory without a device  HEENT  PERRLA, EOMI; Melampati Score III  Neck  Stout; No carotid bruits or JVD  Cardiovascular  Regular rate and rhythm  No murmur or gallop  Pulmonary  LS CTA bilaterally  Abdomen  Soft, NT/ND, No rebound, no guarding  No rashes  Extremities:  Pitting edema: +1  Distal pulses palpable  Neurologic  Cranial nerves grossly intact; no tremors present  Psychiatric  Thought content organized  Mood appears stable  Endocrine  Barrientos Facies not present  Dorsal Thoracic Prominence not present  Skin tags not present  Acanthosis nigricans present  Purple Striae not present  Dermatologic  Intertrigo not present  Hirsutism not present      In summary, Berhane Murray has MIKE w/ CPAP use, bilateral knee pain, and diabetes (diet controlled) in the setting of morbid obesity.  Body mass index is 40.39 kg/m .  This satisfies NIH criteria for bariatric surgery. Once Berhane Murray has been cleared by our bariatric psychologist and our bariatric dietitian, completed initial lab work, attended one support group, and satisfied insurance mandated visits if applicable, she  will be scheduled with one of our bariatric surgeons for Bariatric Surgery Consultation. she is interested in the sleeve gastrectomy.  Berhane Murray understands that routine health care maintenance will need to be up to date prior to surgery. she verbalizes understanding of the process to surgery and expectations for the postoperative period including the need for lifelong lifestyle changes, vitamin supplementation, and laboratory monitoring.       Weight will need to be 228 prior to submitting for insurance approval.  Standard DVT protocol  Ursodiol for 6 months after surgery to prevent gallstone formation  Sleep Study not indicate; has CPAP  A1c will need to be < 8 at the time of surgery; current 6.1    Berhane MISHRA Trevor was  reminded to prevent pregnancy for 18-24 months post operatively. Post-menopausal      ABNER Brewer CNP  [phone]  [facility]     Total time spent on the date of this encounter doing: chart review, review of test results, patient visit, physical exam, education, counseling, developing plan of care, and documenting = 60 minutes.       Again, thank you for allowing me to participate in the care of your patient.        Sincerely,        ABNER Lo CNP    Electronically signed

## 2025-05-15 NOTE — PROGRESS NOTES
"New Bariatric Surgery Consultation Note    May 14, 2025    RE: Berhane Murray  MR#: [id number]  : 1975      Referring provider:       2025    11:00 PM   --   Who referred you Dr. Shabana Pat       Chief Complaint/Reason for visit: evaluation for possible weight loss surgery    Dear Shabana Toribio MD (General),    I had the pleasure of seeing your patient, Berhane Murray, to evaluate her obesity and consider her for possible weight loss surgery. As you know, Berhane Murray is 49 year old.  She has a height of 5' 3\", a weight of 228 lbs 0 oz, and calculated Body mass index is 40.39 kg/m .        HISTORY OF PRESENT ILLNESS:      2025    11:00 PM   Weight Loss History Reviewed with Patient   How long have you been overweight? Since late 20's to early 40's   What is the most that you have ever weighed 225   What is the most weight you have lost? 15   I have tried the following methods to lose weight Watching portions or calories    Exercise    Weight Watchers    Pre packaged meals ex: Nutrisystem   I have tried the following weight loss medications? (Check all that apply) None       CO-MORBIDITIES OF OBESITY INCLUDE:      2025    11:00 PM   --   I have the following health issues associated with obesity Type II Diabetes    Pre-Diabetes    Sleep Apnea   No GERD    PAST MEDICAL HISTORY:  Past Medical History:   Diagnosis Date    Back pain     Bilateral knee pain     Cervical high risk HPV (human papillomavirus) test positive 2017, 21    Diabetes (H)     diet controlled    Nonsenile cataract May 2025    MIKE (obstructive sleep apnea)     Peripheral edema        PAST SURGICAL HISTORY:  Past Surgical History:   Procedure Laterality Date     SECTION          COLONOSCOPY         FAMILY HISTORY:   Family History   Problem Relation Age of Onset    Cancer Mother     Breast Cancer Mother     Hypertension Father     Diabetes Father     Glaucoma Paternal Grandmother     Diabetes " Paternal Grandmother     Diabetes Sister        SOCIAL HISTORY:       5/14/2025    11:00 PM   Social History Questions Reviewed With Patient   Which best describes your employment status (select all that apply) I work full-time   If you work, what is your occupation?    Which best describes your marital status    Who do you have in your support network that can be available to help you for the first 2 weeks after surgery? Spouse or mother   Who can you count on for support throughout your weight loss surgery journey? Spouse       HABITS:      5/14/2025    11:00 PM   --   How often do you drink alcohol? 2-4 times a month   If you do drink alcohol, how many drinks might you have in a day? (one drink = 5 oz. wine, 1 can/bottle of beer, 1 shot liquor) 1 or 2   Do you currently use any of the following Nicotine products? No   Have you ever used any of the following nicotine products? No   Have you or are you currently using street drugs or prescription strength medication for which you do not have a prescription for? No   Do you have a history of chemical dependency (alcohol or drug abuse)? No       PSYCHOLOGICAL HISTORY:       5/14/2025    11:00 PM   Psychological History Reviewed With Patient   Have you ever attempted suicide? Never.   Have you had thoughts of suicide in the past year? No   Have you ever been hospitalized for mental illness or a suicide attempt? Never.   Do you have a history of chronic pain? No   Have you ever been diagnosed with fibromyalgia? No   Are you currently being treated for any of the following? (select all that apply) I do not have a mental illness       ROS:      5/14/2025    11:00 PM   --   Skin None of the above   HEENT Missing teeth   If you answered yes to missing teeth, please indicate how many 1   Musculoskeletal Back pain    Swelling of legs   Cardiovascular None of the above   Pulmonary Snoring    Awaken from sleep to catch your breath    People have told me  "I stop breathing while asleep    Experience morning headaches   Gastrointestinal None of the above   Genitourinary None of the above   Hematological None of the above   Neurological None of the above   Female only Loss of menstrual cycles    Polycystic ovarian syndrome (PCOS)    Post-menopausal       EATING BEHAVIORS:      5/14/2025    11:00 PM   --   Have you or anyone else thought that you had an eating disorder? No   Do you currently binge eat (eat a large amount of food in a short time)? No   Are you an emotional eater? No   Do you get up to eat after falling asleep? No   Can you afford 3 meals a day? Yes   Can you afford 50-60 dollars a month for vitamins? Yes       EXERCISE:      5/14/2025    11:00 PM   --   How often do you exercise? 1 to 2 times per week   What is the duration of your exercise (in minutes)? 15 Minutes   What types of exercise do you do? walking   What keeps you from being more active? Pain    Too tired       MEDICATIONS:  Current Outpatient Medications   Medication Sig Dispense Refill    albuterol (PROAIR HFA/PROVENTIL HFA/VENTOLIN HFA) 108 (90 Base) MCG/ACT inhaler Inhale 2 puffs into the lungs every 6 hours 18 g 3    azelastine (OPTIVAR) 0.05 % ophthalmic solution Place 1 drop into both eyes 2 times daily as needed (for itchy eyes). 6 mL 11    cycloSPORINE (RESTASIS) 0.05 % ophthalmic emulsion Place 1 drop into both eyes 2 times daily. 60 each 3       ALLERGIES:  Allergies   Allergen Reactions    Prednisone      Fainted after competing full dose.        LABS/IMAGING/MEDICAL RECORDS REVIEW: ordered    PHYSICAL EXAM:  /64 (BP Location: Right arm, Patient Position: Sitting)   Ht 1.6 m (5' 3\")   Wt 103.4 kg (228 lb)   LMP  (LMP Unknown)   BMI 40.39 kg/m    General Appearance  No acute distress. Obesity   Alert and Oriented   Ambulatory without a device  HEENT  PERRLA, EOMI; Melampati Score III  Neck  Stout; No carotid bruits or JVD  Cardiovascular  Regular rate and rhythm  No murmur " or gallop  Pulmonary  LS CTA bilaterally  Abdomen  Soft, NT/ND, No rebound, no guarding  No rashes  Extremities:  Pitting edema: +1  Distal pulses palpable  Neurologic  Cranial nerves grossly intact; no tremors present  Psychiatric  Thought content organized  Mood appears stable  Endocrine  Barrientos Facies not present  Dorsal Thoracic Prominence not present  Skin tags not present  Acanthosis nigricans present  Purple Striae not present  Dermatologic  Intertrigo not present  Hirsutism not present      In summary, Berhane Murray has MIKE w/ CPAP use, bilateral knee pain, and diabetes (diet controlled) in the setting of morbid obesity.  Body mass index is 40.39 kg/m .  This satisfies NIH criteria for bariatric surgery. Once Berhane Murray has been cleared by our bariatric psychologist and our bariatric dietitian, completed initial lab work, attended one support group, and satisfied insurance mandated visits if applicable, she  will be scheduled with one of our bariatric surgeons for Bariatric Surgery Consultation. she is interested in the sleeve gastrectomy.  Berhane Murray understands that routine health care maintenance will need to be up to date prior to surgery. she verbalizes understanding of the process to surgery and expectations for the postoperative period including the need for lifelong lifestyle changes, vitamin supplementation, and laboratory monitoring.       Weight will need to be 228 prior to submitting for insurance approval.  Standard DVT protocol  Ursodiol for 6 months after surgery to prevent gallstone formation  Sleep Study not indicate; has CPAP  A1c will need to be < 8 at the time of surgery; current 6.1    Berhane Murray was reminded to prevent pregnancy for 18-24 months post operatively. Post-menopausal      ABNER Brewer CNP  [phone]  [facility]     Total time spent on the date of this encounter doing: chart review, review of test results, patient visit, physical  exam, education, counseling, developing plan of care, and documenting = 60 minutes.

## 2025-05-17 LAB
DEPRECATED CALCIDIOL+CALCIFEROL SERPL-MC: <15 UG/L (ref 20–75)
VITAMIN D2 SERPL-MCNC: <5 UG/L
VITAMIN D3 SERPL-MCNC: 10 UG/L

## 2025-05-18 LAB
ANNOTATION COMMENT IMP: NORMAL
RETINYL PALMITATE SERPL-MCNC: <0.02 MG/L
VIT A SERPL-MCNC: 0.32 MG/L
VIT B1 PYROPHOSHATE BLD-SCNC: 108 NMOL/L

## 2025-06-05 ENCOUNTER — VIRTUAL VISIT (OUTPATIENT)
Dept: SURGERY | Facility: CLINIC | Age: 50
End: 2025-06-05
Payer: COMMERCIAL

## 2025-06-05 DIAGNOSIS — E66.01 MORBID OBESITY (H): Primary | ICD-10-CM

## 2025-06-05 NOTE — PROGRESS NOTES
Virtual Visit Details    Type of service:  Video Visit     Originating Location (pt. Location): Home    Distant Location (provider location):  Off-site  Platform used for Video Visit: [employer]    Start Time: 7:20 AM  End Time: 8:11 AM    Berhane would like to follow through with VSG for health reasons. She has struggled with her weight since pregnancy and now is concerned about various comorbidities. She has a history of mild anxiety about her appearance on Zoom videos. She is an emotional and boredom eater. She has good knowledge of surgery and good support. She will follow up and complete psychological testing. F50.9; E66.01

## 2025-06-16 ENCOUNTER — VIRTUAL VISIT (OUTPATIENT)
Dept: SURGERY | Facility: CLINIC | Age: 50
End: 2025-06-16
Payer: COMMERCIAL

## 2025-06-16 ENCOUNTER — OFFICE VISIT (OUTPATIENT)
Dept: OBGYN | Facility: CLINIC | Age: 50
End: 2025-06-16
Attending: STUDENT IN AN ORGANIZED HEALTH CARE EDUCATION/TRAINING PROGRAM
Payer: COMMERCIAL

## 2025-06-16 VITALS
HEIGHT: 63 IN | BODY MASS INDEX: 40.61 KG/M2 | SYSTOLIC BLOOD PRESSURE: 124 MMHG | DIASTOLIC BLOOD PRESSURE: 82 MMHG | WEIGHT: 229.2 LBS | HEART RATE: 81 BPM

## 2025-06-16 DIAGNOSIS — Z71.3 NUTRITIONAL COUNSELING: ICD-10-CM

## 2025-06-16 DIAGNOSIS — E66.813 OBESITY, CLASS III, BMI 40-49.9 (MORBID OBESITY) (H): ICD-10-CM

## 2025-06-16 DIAGNOSIS — E11.9 TYPE 2 DIABETES MELLITUS WITHOUT COMPLICATION, WITHOUT LONG-TERM CURRENT USE OF INSULIN (H): ICD-10-CM

## 2025-06-16 DIAGNOSIS — R87.810 CERVICAL HIGH RISK HPV (HUMAN PAPILLOMAVIRUS) TEST POSITIVE: Primary | ICD-10-CM

## 2025-06-16 DIAGNOSIS — E11.9 TYPE 2 DIABETES MELLITUS WITHOUT COMPLICATION, WITHOUT LONG-TERM CURRENT USE OF INSULIN (H): Primary | ICD-10-CM

## 2025-06-16 LAB
BACTERIAL VAGINOSIS VAG-IMP: NEGATIVE
CANDIDA DNA VAG QL NAA+PROBE: NOT DETECTED
CANDIDA GLABRATA / CANDIDA KRUSEI DNA: NOT DETECTED
LAB DIRECTOR COMMENTS: NORMAL
LAB DIRECTOR DISCLAIMER: NORMAL
LAB DIRECTOR INTERPRETATION: NORMAL
LAB DIRECTOR METHODOLOGY: NORMAL
LAB DIRECTOR RESULTS: NORMAL
SPECIMEN TYPE: NORMAL
T VAGINALIS DNA VAG QL NAA+PROBE: NOT DETECTED

## 2025-06-16 PROCEDURE — 99213 OFFICE O/P EST LOW 20 MIN: CPT | Performed by: STUDENT IN AN ORGANIZED HEALTH CARE EDUCATION/TRAINING PROGRAM

## 2025-06-16 PROCEDURE — 87624 HPV HI-RISK TYP POOLED RSLT: CPT | Performed by: STUDENT IN AN ORGANIZED HEALTH CARE EDUCATION/TRAINING PROGRAM

## 2025-06-16 PROCEDURE — 81515 NFCT DS BV&VAGINITIS DNA ALG: CPT | Performed by: STUDENT IN AN ORGANIZED HEALTH CARE EDUCATION/TRAINING PROGRAM

## 2025-06-16 PROCEDURE — 98002 SYNCH AUDIO-VIDEO NEW MOD 45: CPT | Performed by: DIETITIAN, REGISTERED

## 2025-06-16 ASSESSMENT — ANXIETY QUESTIONNAIRES
3. WORRYING TOO MUCH ABOUT DIFFERENT THINGS: NOT AT ALL
2. NOT BEING ABLE TO STOP OR CONTROL WORRYING: NOT AT ALL
GAD7 TOTAL SCORE: 0
5. BEING SO RESTLESS THAT IT IS HARD TO SIT STILL: NOT AT ALL
GAD7 TOTAL SCORE: 0
7. FEELING AFRAID AS IF SOMETHING AWFUL MIGHT HAPPEN: NOT AT ALL
4. TROUBLE RELAXING: NOT AT ALL
GAD7 TOTAL SCORE: 0
8. IF YOU CHECKED OFF ANY PROBLEMS, HOW DIFFICULT HAVE THESE MADE IT FOR YOU TO DO YOUR WORK, TAKE CARE OF THINGS AT HOME, OR GET ALONG WITH OTHER PEOPLE?: NOT DIFFICULT AT ALL
1. FEELING NERVOUS, ANXIOUS, OR ON EDGE: NOT AT ALL
6. BECOMING EASILY ANNOYED OR IRRITABLE: NOT AT ALL
IF YOU CHECKED OFF ANY PROBLEMS ON THIS QUESTIONNAIRE, HOW DIFFICULT HAVE THESE PROBLEMS MADE IT FOR YOU TO DO YOUR WORK, TAKE CARE OF THINGS AT HOME, OR GET ALONG WITH OTHER PEOPLE: NOT DIFFICULT AT ALL
7. FEELING AFRAID AS IF SOMETHING AWFUL MIGHT HAPPEN: NOT AT ALL

## 2025-06-16 NOTE — LETTER
2025       RE: Berhane Murray  [address]  [place] [address]     Dear Colleague,    Thank you for referring your patient, Berhane Murray, to the [facility]. Please see a copy of my visit note below.    [facility]    CC: Preventative Exam    Subjective:   Berhane Murray is a 49 year old  who presents for a preventative health exam.     Current concerns: Annual Pap smear, discussion regarding hysterectomy, candidiasis and bacterial vaginosis testing, string check for Mirena IUD placed 2024.    PMH notable for: back pain, diabetes (diet controlled), MIKE (uses CPAP), obesity,  peripheral edema, nonsenile cataracts, seasonal allergies,     OB History:   SAB  2. 2009 term CS  Denies history of diabetes or hypertension during pregnancy.     Gyn History:   LMP: No LMP recorded (lmp unknown). (Menstrual status: IUD).  Menses: Post menopausal. Last episode of vaginal bleeding noted in ~ early 2025 and has gone without bleeding since.   Contraception: Mirena IUD since 2024. She is not interested in STI screening today.   Sexual activity: has intercourse with  (male)  History of STI: None   Surgeries: hysteroscopic endometrial polypectomy (2024)    Health goals: Loss weight with semaglutide, improve exercise tolerance and strength back to reduce pain, considering hysterectomy to reduce HPV cervical cancer risk.   Present exercise/diet habits: No exercise.    Domestic violence concerns: Not discussed at today's visit.  Mental health: Not discussed at today's visit.    Screening tests:   Pap smear history: Last pap smear was in  HPV positive, colposcopy negative.  HIV Negative () , Hep C Ab Negative ()  Mammogram: Schedule for 7/15/2025  Colorectal cancer screening: schedule for 25   Lipid panel: , Direct HDL 38, and non HDL at 134.   "(5/15/25)  Diabetes screening : Hgb A1C 6.3  and estimated average glucose of 134 (5/15/25)    Immunizations, reviewed:   Not discussed at today's visit.    Medical, surgical and family histories, medications and allergies were reviewed and updated.     Soc Hx:Denies tobacco use. Alcohol use occasionally.     Objective:   /82   Pulse 81   Ht 1.6 m (5' 3\")   Wt 104 kg (229 lb 3.2 oz)   LMP  (LMP Unknown)   BMI 40.60 kg/m    General: Healthy appearing. Alert, oriented. Affect is appropriate.   Heart: Regular rate  Lungs: Breathing is unlabored.   Abdomen: Soft, nontender, without masses or hernias.   Skin: Warm and dry without malignant appearing lesions.   Pelvic exam: External genitalia without normal limits. Moist, rugated vaginal mucosa with physiologic discharge. Cervix is without lesions, IUD strings visible.  Bimanual examination: uterus is midline, mobile, nontender, not enlarged. There are no adnexal masses or tenderness appreciated.   Extremities: Skin is warm and dry. Lower extremities without varicosities or edema.   Neuro: Sensory and motor exam grossly intact.     Assessment/Plan:   Berhane Murray is a 49 year old  with a past medical history significant for back pain, diabetes, MIKE (on CPAP) , non-senile cateracts, obesity, and peripheral edema,  who presents for a preventative health exam including pap smear (cervix and anus) and to discuss hysterectomy in the setting of IUD placement in 2024 to control post-menopausal vaginal bleeding.     Abnormal Pap Smear :  Patient HPV positive on cervical pap , ,  with follow up colposcopy negative for intraepithelial lesions.   Plan  -routine pap cervical and anus exam today  -follow up colposcopy as indicated    Hysterectomy  Patient is interested in further discussion regarding hysterectomy in light of history of abnormal paps and vaginal bleeding currently being managed with Mirena IUD. She would like to explore options further " if the cervical pap from today is also abnormal. Confirmed that Mirena IUD is in place and strings are visible.  Plan  -video visit in ~ 2 weeks to discuss pap results and further discuss hysterectomy.  - likely would recommend robotic given habitus and history of     Concern for Candidiasis and Bacterial vaginosis  ROS negative for skin irritation, increased vagina discharge, change in vaginal discharge odor, and new sexual partner. However patient is concerned that due to her diabetes, that she is at increased risk of bacterial and/or yeast overgrowth.  Plan  -multiplex PCR cervical sample collected    Health Maintenance:   - Mammography: Scheduled in ~ 2 weeks  - Colonoscopy: scheduled 25  - Vaccines: Not discussed at today's visit  - Cholesterol screening: Completed, recommend follow up with PCP  - Diabetes screening: Completed, following with PCP and GI for weight loss management. Starting on semaglutide.   - Cervical cancer screening: Performed today and sent to the lab.  - Discussed healthy food choices, exercise, vit D and calcium supplementation, mental health, sleep, safety.     Contraception:   Mirena IUD placed in 2024    Answers submitted by the patient for this visit:  Patient Health Questionnaire (G7) (Submitted on 2025)  LESLIE 7 TOTAL SCORE: 0      Follow-up in 1 year, sooner as needed if any concerns.     Written by Lillian Whitt, Medical Student    I was present with the medical student who participated in the service and in the documentation of the note. I have verified the history and personally performed the physical exam and medical decision making. I agree with the assessment and plan of care as documented in the note.     Ruby Jimenez MD        Again, thank you for allowing me to participate in the care of your patient.      Sincerely,    Ruby Jimenez MD

## 2025-06-16 NOTE — PROGRESS NOTES
[facility]    CC: Preventative Exam    Subjective:   Berhane Murray is a 49 year old  who presents for a preventative health exam.     Current concerns: Annual Pap smear, discussion regarding hysterectomy, candidiasis and bacterial vaginosis testing, string check for Mirena IUD placed 2024.    PMH notable for: back pain, diabetes (diet controlled), MIKE (uses CPAP), obesity,  peripheral edema, nonsenile cataracts, seasonal allergies,     OB History:   SAB  2. 2009 term CS  Denies history of diabetes or hypertension during pregnancy.     Gyn History:   LMP: No LMP recorded (lmp unknown). (Menstrual status: IUD).  Menses: Post menopausal. Last episode of vaginal bleeding noted in ~ early 2025 and has gone without bleeding since.   Contraception: Mirena IUD since 2024. She is not interested in STI screening today.   Sexual activity: has intercourse with  (male)  History of STI: None   Surgeries: hysteroscopic endometrial polypectomy (2024)    Health goals: Loss weight with semaglutide, improve exercise tolerance and strength back to reduce pain, considering hysterectomy to reduce HPV cervical cancer risk.   Present exercise/diet habits: No exercise.    Domestic violence concerns: Not discussed at today's visit.  Mental health: Not discussed at today's visit.    Screening tests:   Pap smear history: Last pap smear was in  HPV positive, colposcopy negative.  HIV Negative () , Hep C Ab Negative ()  Mammogram: Schedule for 7/15/2025  Colorectal cancer screening: schedule for 25   Lipid panel: , Direct HDL 38, and non HDL at 134.  (5/15/25)  Diabetes screening : Hgb A1C 6.3  and estimated average glucose of 134 (5/15/25)    Immunizations, reviewed:   Not discussed at today's visit.    Medical, surgical and family histories, medications and allergies were reviewed and updated.     Soc Hx:Denies tobacco use. Alcohol use occasionally.  "    Objective:   /82   Pulse 81   Ht 1.6 m (5' 3\")   Wt 104 kg (229 lb 3.2 oz)   LMP  (LMP Unknown)   BMI 40.60 kg/m    General: Healthy appearing. Alert, oriented. Affect is appropriate.   Heart: Regular rate  Lungs: Breathing is unlabored.   Abdomen: Soft, nontender, without masses or hernias.   Skin: Warm and dry without malignant appearing lesions.   Pelvic exam: External genitalia without normal limits. Moist, rugated vaginal mucosa with physiologic discharge. Cervix is without lesions, IUD strings visible.  Bimanual examination: uterus is midline, mobile, nontender, not enlarged. There are no adnexal masses or tenderness appreciated.   Extremities: Skin is warm and dry. Lower extremities without varicosities or edema.   Neuro: Sensory and motor exam grossly intact.     Assessment/Plan:   Berhane Murray is a 49 year old  with a past medical history significant for back pain, diabetes, MIKE (on CPAP) , non-senile cateracts, obesity, and peripheral edema,  who presents for a preventative health exam including pap smear (cervix and anus) and to discuss hysterectomy in the setting of IUD placement in 2024 to control post-menopausal vaginal bleeding.     Abnormal Pap Smear :  Patient HPV positive on cervical pap , ,  with follow up colposcopy negative for intraepithelial lesions.   Plan  -routine pap cervical and anus exam today  -follow up colposcopy as indicated    Hysterectomy  Patient is interested in further discussion regarding hysterectomy in light of history of abnormal paps and vaginal bleeding currently being managed with Mirena IUD. She would like to explore options further if the cervical pap from today is also abnormal. Confirmed that Mirena IUD is in place and strings are visible.  Plan  -video visit in ~ 2 weeks to discuss pap results and further discuss hysterectomy.  - likely would recommend robotic given habitus and history of     Concern for Candidiasis and " Bacterial vaginosis  ROS negative for skin irritation, increased vagina discharge, change in vaginal discharge odor, and new sexual partner. However patient is concerned that due to her diabetes, that she is at increased risk of bacterial and/or yeast overgrowth.  Plan  -multiplex PCR cervical sample collected    Health Maintenance:   - Mammography: Scheduled in ~ 2 weeks  - Colonoscopy: scheduled 6/27/25  - Vaccines: Not discussed at today's visit  - Cholesterol screening: Completed, recommend follow up with PCP  - Diabetes screening: Completed, following with PCP and GI for weight loss management. Starting on semaglutide.   - Cervical cancer screening: Performed today and sent to the lab.  - Discussed healthy food choices, exercise, vit D and calcium supplementation, mental health, sleep, safety.     Contraception:   Mirena IUD placed in July 2024    Answers submitted by the patient for this visit:  Patient Health Questionnaire (G7) (Submitted on 6/16/2025)  LESLIE 7 TOTAL SCORE: 0      Follow-up in 1 year, sooner as needed if any concerns.     Written by Lillian Whitt, Medical Student    I was present with the medical student who participated in the service and in the documentation of the note. I have verified the history and personally performed the physical exam and medical decision making. I agree with the assessment and plan of care as documented in the note.     Ruby Jimenez MD

## 2025-06-16 NOTE — PROGRESS NOTES
"Berhane Murray is a 49 year old who is being evaluated via a billable telephone    How would you like to obtain your AVS? MyChart  If the video visit is dropped, the invitation should be resent by: Send to e-mail at: [email or web address]  Will anyone else be joining your video visit? No        Initial Structured Weight Loss Supervised Diet Evaluation     Assessment:  Berhane is being seen today for initial RD nutritional evaluation. Patient has been unsuccessful with non-surgical weight loss methods and is interested in bariatric surgery. Today we reviewed current eating habits and level of physical activity, and instructed on the changes that are required for successful bariatric outcomes.    Would like to have surgery due to difficulty losing weight, menopause, dx of diabetes   Would still like to try ozempic- has not been able to start yet.       Surgery of interest per pt: LSG.    Workflow review:  Support Group: Not completed.  Psychology:In progress.  Lab work:Completed.  SWL:No   Using CPAP- feels like her sleep is really good when using it    Weight goal: At or below initial.    Anthropometrics:  Pt's weight is 228lb  BMI: 40.39  Ideal body weight: 52.4 kg (115 lb 8.3 oz)  Adjusted ideal body weight: 72.8 kg (160 lb 8.2 oz)    Medical History:  Patient Active Problem List   Diagnosis    CARDIOVASCULAR SCREENING; LDL GOAL LESS THAN 160    Cervical high risk HPV (human papillomavirus) test positive    Diabetes mellitus, type 2 (H)    Allergic rhinitis due to pollen, unspecified seasonality    Wheezing    Cough    Conjunctivitis of both eyes, unspecified conjunctivitis type    Class 2 severe obesity due to excess calories with serious comorbidity in adult (H)    High risk HPV infection    Post-menopause bleeding    MIKE (obstructive sleep apnea)      Diabetes: yes  HbA1c:  No results found for: \"HGBA1C\"  Biggest struggle with weight loss:feels like she doesn't have a great understanding of what she should be " eating    Nutrition History  Food allergies/intolerances/cultural or religous food customs: No, noticing some acid reflux with blueberries  Vitamins/Mineral currently taking: vitamin D    Socioeconomic Status  Who does the grocery shopping for your household? Self   Who prepares your meals at home?     Diet Recall/Time  Breakfast: wake up around 6am- starts with cup of black coffee with sugar free syrup, typically eating first meal around 9am- turkey sandwich or turkey carbajal and slice of toast, scrambled eggs, gingerale in water  Lunch: 11a- polish sausage and bread, tuna with spinach, water and gingerale  12-1p- popcorn  Dinner: eating a lot of the same meals- tacos, steak and asparagus, pork chops, salmon and brown rice, salad or broccoli, ribs, baked chicken    Typical Snacks: popcorn- enjoys more salty foods, chips, twizzlers    Eating out: going out to eat frequently- twice weekly- wendi pfeiffer, fast food    Beverages  Little bit of Ginger Ale mixed with 40oz water, bubly water    Exercise  Back pain, not able to move as much as she would like to- used to walk a lot    Nutrition Diagnosis (PES statement)    Morbid obesity related to excessive calorie intake as evidenced by Intake of high caloric density foods/beverages (juice, soda, alcohol) at meals and/or snacks; large portions; frequent grazing; Estimated intake that exceeds estimated daily energy intake; Binge eating patterns; Frequent excessive fast food or restaurant intake; and BMI 40.39       Intervention    Nutrition Education:   Provided general overview of diet and lifestyle modifications needed to be a deemed a safe candidate for bariatric surgery.   Educated patient on how to read a food label: choosing foods with than 10 grams fat and 10 grams sugar per serving to avoid dumping syndrome.    Food/Nutrient Delivery:  Educated patient on eating three meals, with cutting out snacking.  Discussed importance of adequate hydration after surgery,  with goal of at least 64 oz of fluids/day.  Addressed avoiding all carbonated, caffeinated and sweetened drinks to prepare for bariatric surgery.     Nutrition Counseling:  Mindful eating techniques: Encouraged slow meal pace, chewing foods to applesauce consistency for 20-30 minutes/meal.   Discussed  fluids 30 minutes before, during, and after meal to prevent dumping syndrome and discomfort post bariatric surgery.     Instructions/Goals:     Start implementing bariatric surgery lifestyle modifications.    Handouts Provided:   [facility] Bariatric Surgery Nutrition Info  Snack list    Monitor/Evaluation:  Pt. s target weight: no gain from initial visit, patient verbalized understanding.     Plan for next visit:   Bariatric plate.      Video-Visit Details    Type of service:  Video Visit    Video Start Time (time video started): 12:50p    Video End Time (time video stopped): 1:30p    Originating Location (pt. Location): Home      Distant Location (provider location):  Off-site    Mode of Communication:  Video Conference via [facility]    Physician has received verbal consent for a Video Visit from the patient? Yes      Manju Lucia RD

## 2025-06-17 ENCOUNTER — RESULTS FOLLOW-UP (OUTPATIENT)
Dept: OBGYN | Facility: CLINIC | Age: 50
End: 2025-06-17

## 2025-06-17 LAB
HPV HR 12 DNA CVX QL NAA+PROBE: NEGATIVE
HPV16 DNA CVX QL NAA+PROBE: NEGATIVE
HPV18 DNA CVX QL NAA+PROBE: NEGATIVE
HUMAN PAPILLOMA VIRUS FINAL DIAGNOSIS: NORMAL

## 2025-06-18 ENCOUNTER — TELEPHONE (OUTPATIENT)
Dept: OBGYN | Facility: CLINIC | Age: 50
End: 2025-06-18
Payer: COMMERCIAL

## 2025-06-18 NOTE — TELEPHONE ENCOUNTER
LVM for patient to schedule earlier appt with Dr. Jimenez, per her note OK to be in a 15 minute spot or 7/3 am or 7/11 pm.

## 2025-06-18 NOTE — TELEPHONE ENCOUNTER
----- Message from Ruby Jimenez sent at 6/17/2025  9:09 PM CDT -----  A 15 min open slot on my normal clinic days would be fine too.  ----- Message -----  From: Ruby Jimenez MD  Sent: 6/17/2025   7:27 PM CDT  To: [facility]    Can you put this pt on for a virtual visit with me on 7/3 AM or 7/11 PM if she's available then?    Thanks!    Ruby Jimenez MD,  06/17/25 7:27 PM

## 2025-06-19 ENCOUNTER — VIRTUAL VISIT (OUTPATIENT)
Dept: SURGERY | Facility: CLINIC | Age: 50
End: 2025-06-19
Payer: COMMERCIAL

## 2025-06-19 DIAGNOSIS — E66.01 MORBID OBESITY (H): Primary | ICD-10-CM

## 2025-06-19 NOTE — PROGRESS NOTES
Virtual Visit Details    Type of service:  Video Visit     Originating Location (pt. Location): Home    Distant Location (provider location):  Off-site  Platform used for Video Visit: Zoom (Telehealth)    Start Time: 7:19 AM  End Time: 8 AM    Berhane has made some changes in eating and lifestyle, but still needs to be more mindful about her eating. She will follow up with a list of activities and mindful eating strategies. She also needs to complete the MMPI-3. F50.9; E66.01

## 2025-06-25 ENCOUNTER — TELEPHONE (OUTPATIENT)
Dept: GASTROENTEROLOGY | Facility: CLINIC | Age: 50
End: 2025-06-25
Payer: COMMERCIAL

## 2025-06-27 ENCOUNTER — HOSPITAL ENCOUNTER (OUTPATIENT)
Facility: AMBULATORY SURGERY CENTER | Age: 50
Discharge: HOME | End: 2025-06-27
Attending: FAMILY MEDICINE | Admitting: FAMILY MEDICINE
Payer: COMMERCIAL

## 2025-06-27 VITALS
DIASTOLIC BLOOD PRESSURE: 88 MMHG | HEIGHT: 63 IN | BODY MASS INDEX: 40.57 KG/M2 | WEIGHT: 229 LBS | HEART RATE: 74 BPM | TEMPERATURE: 97.9 F | SYSTOLIC BLOOD PRESSURE: 139 MMHG | OXYGEN SATURATION: 99 % | RESPIRATION RATE: 14 BRPM

## 2025-06-27 DIAGNOSIS — Z12.11 SCREEN FOR COLON CANCER: Primary | ICD-10-CM

## 2025-06-27 LAB
COLONOSCOPY: NORMAL
GLUCOSE BLDC GLUCOMTR-MCNC: 103 MG/DL (ref 70–99)

## 2025-06-27 PROCEDURE — G8907 PT DOC NO EVENTS ON DISCHARG: HCPCS

## 2025-06-27 PROCEDURE — G8918 PT W/O PREOP ORDER IV AB PRO: HCPCS

## 2025-06-27 PROCEDURE — 45378 DIAGNOSTIC COLONOSCOPY: CPT

## 2025-06-27 RX ORDER — FENTANYL CITRATE 50 UG/ML
INJECTION, SOLUTION INTRAMUSCULAR; INTRAVENOUS PRN
Status: DISCONTINUED | OUTPATIENT
Start: 2025-06-27 | End: 2025-06-27 | Stop reason: HOSPADM

## 2025-06-27 RX ORDER — LIDOCAINE 40 MG/G
CREAM TOPICAL
Status: DISCONTINUED | OUTPATIENT
Start: 2025-06-27 | End: 2025-06-28 | Stop reason: HOSPADM

## 2025-06-27 RX ORDER — ONDANSETRON 2 MG/ML
4 INJECTION INTRAMUSCULAR; INTRAVENOUS
Status: DISCONTINUED | OUTPATIENT
Start: 2025-06-27 | End: 2025-06-28 | Stop reason: HOSPADM

## 2025-06-27 NOTE — H&P
Pre-Endoscopy History and Physical     Berhane Murray MRN# [id number]   YOB: 1975 Age: 49 year old     Date of Procedure: 2025  Primary care provider: Shabana Toribio  Type of Endoscopy: colonoscopy  Reason for Procedure: screening  Type of Anesthesia Anticipated: Moderate Sedation    HPI:    Berhane is a 49 year old female who will be undergoing the above procedure.      A history and physical has been performed. The patient's medications and allergies have been reviewed. The risks and benefits of the procedure and the sedation options and risks were discussed with the patient.  All questions were answered and informed consent was obtained.      She denies a personal or family history of anesthesia complications or bleeding disorders.     Allergies   Allergen Reactions    Prednisone      Fainted after competing full dose.         Cannot display prior to admission medications because the patient has not been admitted in this contact.       Patient Active Problem List   Diagnosis    CARDIOVASCULAR SCREENING; LDL GOAL LESS THAN 160    Cervical high risk HPV (human papillomavirus) test positive    Diabetes mellitus, type 2 (H)    Allergic rhinitis due to pollen, unspecified seasonality    Wheezing    Cough    Conjunctivitis of both eyes, unspecified conjunctivitis type    Class 2 severe obesity due to excess calories with serious comorbidity in adult (H)    High risk HPV infection    Post-menopause bleeding    MIKE (obstructive sleep apnea)        Past Medical History:   Diagnosis Date    Back pain     Bilateral knee pain     Cervical high risk HPV (human papillomavirus) test positive 2017, 21    Diabetes (H)     diet controlled    Nonsenile cataract May 2025    MIKE (obstructive sleep apnea)     Peripheral edema         Past Surgical History:   Procedure Laterality Date     SECTION          COLONOSCOPY      HYSTEROSCOPY W/ POLYPECTOMY         Social History     Tobacco Use  "   Smoking status: Never     Passive exposure: Never    Smokeless tobacco: Never   Substance Use Topics    Alcohol use: Yes       Family History   Problem Relation Age of Onset    Cancer Mother     Breast Cancer Mother     Hypertension Father     Diabetes Father     Glaucoma Paternal Grandmother     Diabetes Paternal Grandmother     Diabetes Sister        REVIEW OF SYSTEMS:     5 point ROS negative except as noted above in HPI, including Gen., Resp., CV, GI &  system review.      PHYSICAL EXAM:   /79   Pulse 82   Resp 18   Ht 1.6 m (5' 3\")   Wt 103.9 kg (229 lb)   SpO2 97%   BMI 40.57 kg/m   Estimated body mass index is 40.57 kg/m  as calculated from the following:    Height as of this encounter: 1.6 m (5' 3\").    Weight as of this encounter: 103.9 kg (229 lb).   GENERAL APPEARANCE: healthy, alert, and no distress  MENTAL STATUS: alert and oriented x 3  RESP: lungs clear to auscultation - no rales, rhonchi or wheezes  CV: regular rates and rhythm and normal S1 S2, no S3 or S4      DIAGNOSTICS:    Not indicated      IMPRESSION   ASA Class 2 - Mild systemic disease        PLAN:       Plan for colonoscopy. We discussed the risks, benefits and alternatives and the patient wished to proceed.    The above has been forwarded to the consulting provider.      Signed Electronically by: Violette Perez MD  June 27, 2025    "

## 2025-07-01 ENCOUNTER — TELEPHONE (OUTPATIENT)
Dept: FAMILY MEDICINE | Facility: CLINIC | Age: 50
End: 2025-07-01
Payer: COMMERCIAL

## 2025-07-01 NOTE — TELEPHONE ENCOUNTER
"Pt requesting Ozempic injection teaching.     Pt states she was recently prescribed Ozempic and struggles to give herself injections. Pt states she attempted to administer one dose, but all of the medication \"squirted out\" before needle was inserted.     RN explained that pt can meet with nurse for injection teaching, but medication cannot be administered by clinic RN or herself while in the clinic. Pt verbalized understanding. RN assisted in scheduling RN appointment for patient on 7/2 at 2 pm.     Tawana Hendrickson RN  "

## 2025-07-02 ENCOUNTER — ALLIED HEALTH/NURSE VISIT (OUTPATIENT)
Dept: FAMILY MEDICINE | Facility: CLINIC | Age: 50
End: 2025-07-02
Payer: COMMERCIAL

## 2025-07-02 DIAGNOSIS — Z71.89 ENCOUNTER FOR INJECTION EDUCATION: Primary | ICD-10-CM

## 2025-07-02 DIAGNOSIS — Z71.89 ENCOUNTER FOR EDUCATION ABOUT INJECTION: ICD-10-CM

## 2025-07-02 PROCEDURE — 99207 PR NO CHARGE NURSE ONLY: CPT

## 2025-07-02 NOTE — PROGRESS NOTES
Pt presents to clinic for ozempic injection teaching.     She confirmed she had watched a demonstration video on the ozempic website prior to her visit but was still nervous.     Writer discussed the steps to administer Ozempic with the pt prior to having the pt repeat back instructions and demonstrate on a practice pad with an example pen. RN provided minimal guidance and tips.    Writer discussed the importance of altering injection sites between the abdomen and confirmed she may also inject this in her outer thigh and back of upper arms. Pt demonstrated appropriate sites back to writer.     Writer cautioned the pt about the potential side effects and when to contact her provider's office. Pt also provided with the number to the clinic if she requires further assistance or has additional questions. A [employer] message was also sent to the pt with additional resources for her reference.     Pt escorted to the lobby in stable condition.    Ramona Cordero RN on 7/2/2025 at 2:54 PM

## 2025-07-03 ENCOUNTER — VIRTUAL VISIT (OUTPATIENT)
Dept: OBGYN | Facility: CLINIC | Age: 50
End: 2025-07-03
Attending: STUDENT IN AN ORGANIZED HEALTH CARE EDUCATION/TRAINING PROGRAM
Payer: COMMERCIAL

## 2025-07-03 DIAGNOSIS — N95.0 POST-MENOPAUSE BLEEDING: ICD-10-CM

## 2025-07-03 DIAGNOSIS — B97.7 HIGH RISK HPV INFECTION: Primary | ICD-10-CM

## 2025-07-03 PROCEDURE — 98006 SYNCH AUDIO-VIDEO EST MOD 30: CPT | Performed by: STUDENT IN AN ORGANIZED HEALTH CARE EDUCATION/TRAINING PROGRAM

## 2025-07-03 NOTE — LETTER
7/3/2025       RE: Berhane Murray  [address]  [place] [address]     Dear Colleague,    Thank you for referring your patient, Berhane Murray, to the [facility] [place] at [facility]. Please see a copy of my visit note below.    Telemedicine Visit: The patient's condition can be safely assessed and treated via synchronous audio and visual telemedicine encounter.      Reason for Telemedicine Visit: Patient has requested telehealth visit    Originating Site (Patient Location): Patient's home    Distant Location (provider location):  On-site    Consent:  The patient/guardian has verbally consented to: the potential risks and benefits of telemedicine (video visit) versus in person care; bill my insurance or make self-payment for services provided; and responsibility for payment of non-covered services.     Mode of Communication:  Video Conference via [employer]    As the provider I attest to compliance with applicable laws and regulations related to telemedicine.    [facility]  Follow-Up Visit    S: Berhane Murray is a 49 year old  here for a telehealth visit to discuss next steps for HPV and AUB.    Her cervical cancer screening/HPV testing history is as follows:   17 NIL Pap, + HR HPV (not 16 or 18). Plan cotest in 1 year.   18 NIL Pap, Neg HR HPV. Plan: cotest in 3 years  21 NIL Pap, + HR HPV (not 16 or 18) Plan cotest in 1 year due 22.    22 Lost to follow-up for pap tracking  23 NIL pap, +HR HPV, not 16/18. Plan [place] bef 23 [place]: ECC, normal. Plan 1 yr co-test  24 NIL pap, +HR HPV, not 16/18. Plan Colposcopy bef 24 - per provider  24 [place]: Benign/normal. Plan 1 yr co-test  25 NIL cervical pap, Neg HPV. Neg anal pap    Regarding AUB:   - Menopause in  or , postmenopausal bleeding in , had a hysteroscopy polypectomy and IUD placement. Since then, Mirena  IUD has remained in place, and she has not had any bleeding.    She has been considering hysterectomy so as not to have to continue to manage HPV and AUB.    She just started Ozempic.    O:  No exam due to virtual visit.    A/P:  Berhane Murray is a 49 year old  - video visit for follow-up of HPV and AUB.    HPV  - if no hysterectomy, will continue to need HPV/pap/anal past annually for now, but can likely space out if they remain negative.  - if hysterectomy, could discuss ongoing anal and vaginal pap/HPV testing.    AUB  - if no hysterectomy, it's possible that Mirena will prevent future formation of polyps or recurrence of postmenopausal bleeding, but unsure. Mirena can be used for 5 years for treatment of HMB according to FDA, but if therapeutic benefit remains after that, can discuss prolonged    Hysterectomy  - discussed r/b of hysterectomy. Given h/o  and obesity, discussed increase risk of complications. Based on my exam at last visit, would need robot  - discussed expected recovery time  - would need EMB prior to hysterectomy    With shared decision-making, plan to repeat pap/HPV in one year and check in on symptoms and overall feeling as well as weight loss on Ozempic to determine next steps.    Video visit length: 15 minutes    Ruby Jimenez MD  OBGYN   [facility]  [facility]       Again, thank you for allowing me to participate in the care of your patient.      Sincerely,    Ruby Jimenez MD

## 2025-07-03 NOTE — PROGRESS NOTES
Telemedicine Visit: The patient's condition can be safely assessed and treated via synchronous audio and visual telemedicine encounter.      Reason for Telemedicine Visit: Patient has requested telehealth visit    Originating Site (Patient Location): Patient's home    Distant Location (provider location):  On-site    Consent:  The patient/guardian has verbally consented to: the potential risks and benefits of telemedicine (video visit) versus in person care; bill my insurance or make self-payment for services provided; and responsibility for payment of non-covered services.     Mode of Communication:  Video Conference via [employer]    As the provider I attest to compliance with applicable laws and regulations related to telemedicine.    [facility]  Follow-Up Visit    S: Berhane Murray is a 49 year old  here for a telehealth visit to discuss next steps for HPV and AUB.    Her cervical cancer screening/HPV testing history is as follows:   17 NIL Pap, + HR HPV (not 16 or 18). Plan cotest in 1 year.   18 NIL Pap, Neg HR HPV. Plan: cotest in 3 years  21 NIL Pap, + HR HPV (not 16 or 18) Plan cotest in 1 year due 22.    22 Lost to follow-up for pap tracking  23 NIL pap, +HR HPV, not 16/18. Plan [place] bef 23 [place]: ECC, normal. Plan 1 yr co-test  24 NIL pap, +HR HPV, not 16/18. Plan Colposcopy bef 24 - per provider  24 [place]: Benign/normal. Plan 1 yr co-test  25 NIL cervical pap, Neg HPV. Neg anal pap    Regarding AUB:   - Menopause in 2018 or , postmenopausal bleeding in , had a hysteroscopy polypectomy and IUD placement. Since then, Mirena IUD has remained in place, and she has not had any bleeding.    She has been considering hysterectomy so as not to have to continue to manage HPV and AUB.    She just started Ozempic.    O:  No exam due to virtual visit.    A/P:  Berhane Murray is a 49 year old  - video visit for follow-up of HPV and  AUB.    HPV  - if no hysterectomy, will continue to need HPV/pap/anal past annually for now, but can likely space out if they remain negative.  - if hysterectomy, could discuss ongoing anal and vaginal pap/HPV testing.    AUB  - if no hysterectomy, it's possible that Mirena will prevent future formation of polyps or recurrence of postmenopausal bleeding, but unsure. Mirena can be used for 5 years for treatment of HMB according to FDA, but if therapeutic benefit remains after that, can discuss prolonged    Hysterectomy  - discussed r/b of hysterectomy. Given h/o  and obesity, discussed increase risk of complications. Based on my exam at last visit, would need robot  - discussed expected recovery time  - would need EMB prior to hysterectomy    With shared decision-making, plan to repeat pap/HPV in one year and check in on symptoms and overall feeling as well as weight loss on Ozempic to determine next steps.    Video visit length: 15 minutes    Ruby Jimenez MD  OBGYN   [facility]  [facility]

## 2025-07-07 ENCOUNTER — PATIENT OUTREACH (OUTPATIENT)
Dept: OBGYN | Facility: CLINIC | Age: 50
End: 2025-07-07
Payer: COMMERCIAL

## 2025-07-07 NOTE — TELEPHONE ENCOUNTER
"07/3/25 virtual visit with provider . Plan per office visit note: \"- if no hysterectomy, will continue to need HPV/pap/anal past annually for now, but can likely space out if they remain negative.- if hysterectomy, could discuss ongoing anal and vaginal pap/HPV testing.\"  "

## 2025-07-11 ENCOUNTER — VIRTUAL VISIT (OUTPATIENT)
Dept: SURGERY | Facility: CLINIC | Age: 50
End: 2025-07-11
Payer: COMMERCIAL

## 2025-07-11 DIAGNOSIS — E11.9 TYPE 2 DIABETES MELLITUS WITHOUT COMPLICATION, WITHOUT LONG-TERM CURRENT USE OF INSULIN (H): ICD-10-CM

## 2025-07-11 PROCEDURE — 98006 SYNCH AUDIO-VIDEO EST MOD 30: CPT | Performed by: NURSE PRACTITIONER

## 2025-07-11 NOTE — PROGRESS NOTES
"Virtual Visit Details    Type of service:  Video Visit     Originating Location (pt. Location): Home    Distant Location (provider location):  On-site  Platform used for Video Visit: [facility]     Bariatric Clinic Follow-Up Visit:    Berhane Murray is a 50 year old  female with There is no height or weight on file to calculate BMI.  presenting here today for follow-up on non-surgical efforts for weight loss. Original Intake visit occurred on 5/15/25 with a weight of 228 lbs and BMI of 43.3.  Along with diet and behavior changes, she has been using semaglutide to assist her weight loss goals and diabetes control.  She has had a 1 pound weight gain. Unfortunately, she encountered some issues with injecting the medication. She reports that she would inject the medication and it would \"run out of her skin\" and she was not actually getting the full dose of medication. She had a nurse visit at her primary care clinic and underwent instruction on proper injection of the medication. She has since successfully administered one dose and believes she has a better understanding of the administration. She reports no side effects from the recent weekly dose. But since she has not had a solid 4 weeks of proper administration at the current dose, we not increase her dosage yet. We will refill her at 0.25 mg weekly for the next 4 weeks and then reassess for side effects before increasing her dosage.    Weight:   Wt Readings from Last 5 Encounters:   06/27/25 103.9 kg (229 lb)   06/16/25 104 kg (229 lb 3.2 oz)   05/15/25 103.4 kg (228 lb)   07/19/24 96 kg (211 lb 9.6 oz)   06/12/24 98.9 kg (218 lb)    pounds      Comorbidities:  Patient Active Problem List   Diagnosis    CARDIOVASCULAR SCREENING; LDL GOAL LESS THAN 160    Cervical high risk HPV (human papillomavirus) test positive    Diabetes mellitus, type 2 (H)    Allergic rhinitis due to pollen, unspecified seasonality    Wheezing    Cough    Conjunctivitis of both eyes, unspecified " conjunctivitis type    Class 2 severe obesity due to excess calories with serious comorbidity in adult (H)    High risk HPV infection    Post-menopause bleeding    MIKE (obstructive sleep apnea)       Current Outpatient Medications:     semaglutide (OZEMPIC) 2 MG/3ML pen, Inject 0.25 mg subcutaneously every 7 days for 28 days., Disp: 1.5 mL, Rfl: 0    albuterol (PROAIR HFA/PROVENTIL HFA/VENTOLIN HFA) 108 (90 Base) MCG/ACT inhaler, Inhale 2 puffs into the lungs every 6 hours, Disp: 18 g, Rfl: 3    azelastine (OPTIVAR) 0.05 % ophthalmic solution, Place 1 drop into both eyes 2 times daily as needed (for itchy eyes)., Disp: 6 mL, Rfl: 11    cycloSPORINE (RESTASIS) 0.05 % ophthalmic emulsion, Place 1 drop into both eyes 2 times daily., Disp: 60 each, Rfl: 3    vitamin D2 (ERGOCALCIFEROL) 69808 units (1250 mcg) capsule, Take 1 capsule (50,000 Units) by mouth once a week. (Patient not taking: Reported on 7/11/2025), Disp: 12 capsule, Rfl: 0      Interim: Since our last visit, she hasgaine a pound and had difficulty with her medication administration.    Plan:   1.  Diet: continue diet recommendations from RD  2. Exercise: increase activity and add strength training  3. Medication: semaglutide 0.25 mg weekly for 4 weeks  4. Follow up with me in 4 weeks  5. Goals: maintain weight while making proper dietary changes in pursuit of bariatric surgery      We discussed [facility] Bariatric Basics including:  -eating 3 meals daily  -reviewed metabolic needs for weight loss based on Resting Metabolic Rate  -protein goals supportive of healthy weight loss  -avoiding/limiting calorie containing beverages  -We discussed the importance of restorative sleep and stress management in maintaining a healthy weight.  -We discussed the National Weight Control Registry healthy weight maintenance strategies and ways to optimize metabolism.  -We discussed the importance of physical activity including cardiovascular and strength training in  "maintaining a healthier weight and explored viable options.      Most recent labs:  Lab Results   Component Value Date    WBC 9.0 05/15/2025    HGB 11.3 (L) 05/15/2025    HCT 35.1 05/15/2025    MCV 84 05/15/2025     05/15/2025     Lab Results   Component Value Date    CHOL 172 05/15/2025     Lab Results   Component Value Date    HDL 38 (L) 05/15/2025     No components found for: \"LDLCALC\"  Lab Results   Component Value Date    TRIG 74 05/15/2025     No results found for: \"CHOLHDL\"  Lab Results   Component Value Date    ALT 23 05/15/2025    AST 26 05/15/2025    ALKPHOS 120 05/15/2025     No results found for: \"HGBA1C\"  Lab Results   Component Value Date    B12 278 05/15/2025     No components found for: \"VITDT1\"  Lab Results   Component Value Date    JULIEN 90 05/15/2025     Lab Results   Component Value Date    PTHI 82 (H) 05/15/2025     Lab Results   Component Value Date    ZN 66.8 05/15/2025     Lab Results   Component Value Date    VIB1WB 108 05/15/2025     Lab Results   Component Value Date    TSH 0.74 05/15/2025     No results found for: \"TEST\"      PHYSICAL ACTIVITY PATTERNS:  Cardiovascular: walking  Strength Training: none    REVIEW OF SYSTEMS  Negative accept noted above.    PHYSICAL EXAM:  Vitals: LMP  (LMP Unknown)   Weight:   Wt Readings from Last 3 Encounters:   06/27/25 103.9 kg (229 lb)   06/16/25 104 kg (229 lb 3.2 oz)   05/15/25 103.4 kg (228 lb)       Deferred as this is video visit  .     Yen Vizcaino, APRN CNP  [phone]  [facility] General and Bariatric Surgery   9:08 AM  7/11/2025   "

## 2025-07-11 NOTE — LETTER
"7/11/2025      Berhane Murray  [address]  [place] [address]      Dear Colleague,    Thank you for referring your patient, Berhane Murray, to the [facility] SURGERY CLINIC AND BARIATRICS CARE [place]. Please see a copy of my visit note below.    Virtual Visit Details    Type of service:  Video Visit     Originating Location (pt. Location): Home    Distant Location (provider location):  On-site  Platform used for Video Visit: [facility]     Bariatric Clinic Follow-Up Visit:    Berhane Murray is a 50 year old  female with There is no height or weight on file to calculate BMI.  presenting here today for follow-up on non-surgical efforts for weight loss. Original Intake visit occurred on 5/15/25 with a weight of 228 lbs and BMI of 43.3.  Along with diet and behavior changes, she has been using semaglutide to assist her weight loss goals and diabetes control.  She has had a 1 pound weight gain. Unfortunately, she encountered some issues with injecting the medication. She reports that she would inject the medication and it would \"run out of her skin\" and she was not actually getting the full dose of medication. She had a nurse visit at her primary care clinic and underwent instruction on proper injection of the medication. She has since successfully administered one dose and believes she has a better understanding of the administration. She reports no side effects from the recent weekly dose. But since she has not had a solid 4 weeks of proper administration at the current dose, we not increase her dosage yet. We will refill her at 0.25 mg weekly for the next 4 weeks and then reassess for side effects before increasing her dosage.    Weight:   Wt Readings from Last 5 Encounters:   06/27/25 103.9 kg (229 lb)   06/16/25 104 kg (229 lb 3.2 oz)   05/15/25 103.4 kg (228 lb)   07/19/24 96 kg (211 lb 9.6 oz)   06/12/24 98.9 kg (218 lb)    pounds      Comorbidities:  Patient Active Problem List   Diagnosis     " CARDIOVASCULAR SCREENING; LDL GOAL LESS THAN 160     Cervical high risk HPV (human papillomavirus) test positive     Diabetes mellitus, type 2 (H)     Allergic rhinitis due to pollen, unspecified seasonality     Wheezing     Cough     Conjunctivitis of both eyes, unspecified conjunctivitis type     Class 2 severe obesity due to excess calories with serious comorbidity in adult (H)     High risk HPV infection     Post-menopause bleeding     MIKE (obstructive sleep apnea)       Current Outpatient Medications:      semaglutide (OZEMPIC) 2 MG/3ML pen, Inject 0.25 mg subcutaneously every 7 days for 28 days., Disp: 1.5 mL, Rfl: 0     albuterol (PROAIR HFA/PROVENTIL HFA/VENTOLIN HFA) 108 (90 Base) MCG/ACT inhaler, Inhale 2 puffs into the lungs every 6 hours, Disp: 18 g, Rfl: 3     azelastine (OPTIVAR) 0.05 % ophthalmic solution, Place 1 drop into both eyes 2 times daily as needed (for itchy eyes)., Disp: 6 mL, Rfl: 11     cycloSPORINE (RESTASIS) 0.05 % ophthalmic emulsion, Place 1 drop into both eyes 2 times daily., Disp: 60 each, Rfl: 3     vitamin D2 (ERGOCALCIFEROL) 73259 units (1250 mcg) capsule, Take 1 capsule (50,000 Units) by mouth once a week. (Patient not taking: Reported on 7/11/2025), Disp: 12 capsule, Rfl: 0      Interim: Since our last visit, she hasgaine a pound and had difficulty with her medication administration.    Plan:   1.  Diet: continue diet recommendations from RD  2. Exercise: increase activity and add strength training  3. Medication: semaglutide 0.25 mg weekly for 4 weeks  4. Follow up with me in 4 weeks  5. Goals: maintain weight while making proper dietary changes in pursuit of bariatric surgery      We discussed [facility] Bariatric Basics including:  -eating 3 meals daily  -reviewed metabolic needs for weight loss based on Resting Metabolic Rate  -protein goals supportive of healthy weight loss  -avoiding/limiting calorie containing beverages  -We discussed the importance of restorative sleep  "and stress management in maintaining a healthy weight.  -We discussed the National Weight Control Registry healthy weight maintenance strategies and ways to optimize metabolism.  -We discussed the importance of physical activity including cardiovascular and strength training in maintaining a healthier weight and explored viable options.      Most recent labs:  Lab Results   Component Value Date    WBC 9.0 05/15/2025    HGB 11.3 (L) 05/15/2025    HCT 35.1 05/15/2025    MCV 84 05/15/2025     05/15/2025     Lab Results   Component Value Date    CHOL 172 05/15/2025     Lab Results   Component Value Date    HDL 38 (L) 05/15/2025     No components found for: \"LDLCALC\"  Lab Results   Component Value Date    TRIG 74 05/15/2025     No results found for: \"CHOLHDL\"  Lab Results   Component Value Date    ALT 23 05/15/2025    AST 26 05/15/2025    ALKPHOS 120 05/15/2025     No results found for: \"HGBA1C\"  Lab Results   Component Value Date    B12 278 05/15/2025     No components found for: \"VITDT1\"  Lab Results   Component Value Date    JULIEN 90 05/15/2025     Lab Results   Component Value Date    PTHI 82 (H) 05/15/2025     Lab Results   Component Value Date    ZN 66.8 05/15/2025     Lab Results   Component Value Date    VIB1WB 108 05/15/2025     Lab Results   Component Value Date    TSH 0.74 05/15/2025     No results found for: \"TEST\"      PHYSICAL ACTIVITY PATTERNS:  Cardiovascular: walking  Strength Training: none    REVIEW OF SYSTEMS  Negative accept noted above.    PHYSICAL EXAM:  Vitals: LMP  (LMP Unknown)   Weight:   Wt Readings from Last 3 Encounters:   06/27/25 103.9 kg (229 lb)   06/16/25 104 kg (229 lb 3.2 oz)   05/15/25 103.4 kg (228 lb)       Deferred as this is video visit  .     ABNER Brewer [facility]  [phone]  [facility] General and Bariatric Surgery   9:08 AM  7/11/2025     Again, thank you for allowing me to participate in the care of your patient.        Sincerely,        ABNER Lo " CNP    Electronically signed

## 2025-07-14 ENCOUNTER — VIRTUAL VISIT (OUTPATIENT)
Dept: SURGERY | Facility: CLINIC | Age: 50
End: 2025-07-14
Payer: COMMERCIAL

## 2025-07-14 DIAGNOSIS — E11.9 TYPE 2 DIABETES MELLITUS WITHOUT COMPLICATION, WITHOUT LONG-TERM CURRENT USE OF INSULIN (H): Primary | ICD-10-CM

## 2025-07-14 DIAGNOSIS — Z71.3 NUTRITIONAL COUNSELING: ICD-10-CM

## 2025-07-14 DIAGNOSIS — E66.01 MORBID OBESITY (H): ICD-10-CM

## 2025-07-14 PROCEDURE — 98006 SYNCH AUDIO-VIDEO EST MOD 30: CPT | Performed by: DIETITIAN, REGISTERED

## 2025-07-14 NOTE — LETTER
7/14/2025      Berhane Murray  [address]  [place] [address]      Dear Colleague,    Thank you for referring your patient, Berhane Murray, to the [facility] SURGERY CLINIC AND BARIATRICS CARE [place]. Please see a copy of my visit note below.    Berhane Murray is a 50 year old who is being evaluated via a billable video visit.      How would you like to obtain your AVS? MyChart  If the video visit is dropped, the invitation should be resent by: Send to e-mail at: [email or web address]  Will anyone else be joining your video visit? No          Follow Up Surgical Weight Loss Supervised Diet Evaluation    Assessment:  Pt. is being seen today for a follow up RD nutritional evaluation. Pt. has been unsuccessful with non-surgical weight loss methods and is interested in bariatric surgery. Today we reviewed current eating habits and level of physical activity, and instructed on the changes that are required for successful bariatric outcomes.    Surgery of interest per pt: undecided.  -not quite sure she would like to have surgery at this point, trying ozempic first    Workflow review:  Support Group: Not completed.  Psychology:In progress.  Lab work:Completed.  SWL:No       Weight goal: At or below initial.    Anthropometrics:  Pt's weight is 229lb    BMI:40.57  Ideal body weight: 52.4 kg (115 lb 8.3 oz)  Adjusted ideal body weight: 73 kg (160 lb 14.6 oz)    Medical History:  Patient Active Problem List   Diagnosis     CARDIOVASCULAR SCREENING; LDL GOAL LESS THAN 160     Cervical high risk HPV (human papillomavirus) test positive     Diabetes mellitus, type 2 (H)     Allergic rhinitis due to pollen, unspecified seasonality     Wheezing     Cough     Conjunctivitis of both eyes, unspecified conjunctivitis type     Class 2 severe obesity due to excess calories with serious comorbidity in adult (H)     High risk HPV infection     Post-menopause bleeding     MIKE (obstructive sleep apnea)      Diabetes: yes, not  "testing every   HbA1c:  No results found for: \"HGBA1C\"    Progress over past month: states she is feeling good, but feeling a little anxious, started ozempic three weeks ago- feeling more hungry, thinking about food more    Diet Recall/Time  Breakfast: cup of coffee this morning, this past week has had toast, little bit of peanut butter, eggs  Lunch: tuna, spinach salad, salmon, steak- hot dog and popcorn, wheat crackers with avocado and hummus, cheese  Dinner: catfish with salad    Typical Snacks: popcorn and chips    Beverages  Water- with lemon and lime and little ginger ale      PES statement:     Obesity related to excessive energy intake as evidenced by Intake of high caloric density foods/beverages (juice, soda, alcohol) at meals and/or snacks; large portions; frequent grazing; Estimated intake that exceeds estimated daily energy intake; Binge eating patterns; Frequent excessive fast food or restaurant intake; and BMI 40.57     Intervention    Nutrition Education:   Provided general overview of diet and lifestyle modifications needed to be a deemed a safe candidate for bariatric surgery.   Educated patient on how to read a food label: choosing foods with than 10 grams fat and 10 grams sugar per serving to avoid dumping syndrome.    Food/Nutrient Delivery:  Educated patient on eating three meals, with cutting out snacking.  Bariatric Plate: Patient and I discussed the importance of including a lean protein source (20-30 grams/meal), vegetables (included at lunch and dinner), one serving (15g) of carbohydrate, and limited added fat (1 tb/day) at each meal.   Educated patient on using a protein powder drink as a meal replacement and/or supplement after bariatric surgery.   Discussed importance of adequate hydration after surgery, with goal of at least 64 oz of fluids/day.  Addressed avoiding all carbonated, caffeinated and sweetened drinks to prepare for bariatric surgery.     Instructions/Goals:     Continue " implementing bariatric surgery lifestyle modifications.  Aim for 60-80g protein per day  Increase fiber  Sugar-free gingerale    Handouts Provided:   Protein and fiber lists  Label reading  Protein supplement list    Monitor/Evaluation:  Pt. s target weight:  no gain from initial visit, pt. verbalized understanding.     Plan for next visit:     Review Bariatric plate       Video-Visit Details    Type of service:  Video Visit    Video Start Time (time video started): 10:00a    Video End Time (time video stopped): 10:30a    Originating Location (pt. Location): Home      Distant Location (provider location):  Off-site    Mode of Communication:  Video Conference via [facility]    Physician has received verbal consent for a Video Visit from the patient? Yes      Manju Lucia RD            Again, thank you for allowing me to participate in the care of your patient.        Sincerely,        Manju Lucia RD    Electronically signed

## 2025-07-14 NOTE — PROGRESS NOTES
"Berhane Murray is a 50 year old who is being evaluated via a billable video visit.      How would you like to obtain your AVS? MyChart  If the video visit is dropped, the invitation should be resent by: Send to e-mail at: [email or web address]  Will anyone else be joining your video visit? No          Follow Up Surgical Weight Loss Supervised Diet Evaluation    Assessment:  Pt. is being seen today for a follow up RD nutritional evaluation. Pt. has been unsuccessful with non-surgical weight loss methods and is interested in bariatric surgery. Today we reviewed current eating habits and level of physical activity, and instructed on the changes that are required for successful bariatric outcomes.    Surgery of interest per pt: undecided.  -not quite sure she would like to have surgery at this point, trying ozempic first    Workflow review:  Support Group: Not completed.  Psychology:In progress.  Lab work:Completed.  SWL:No       Weight goal: At or below initial.    Anthropometrics:  Pt's weight is 229lb    BMI:40.57  Ideal body weight: 52.4 kg (115 lb 8.3 oz)  Adjusted ideal body weight: 73 kg (160 lb 14.6 oz)    Medical History:  Patient Active Problem List   Diagnosis    CARDIOVASCULAR SCREENING; LDL GOAL LESS THAN 160    Cervical high risk HPV (human papillomavirus) test positive    Diabetes mellitus, type 2 (H)    Allergic rhinitis due to pollen, unspecified seasonality    Wheezing    Cough    Conjunctivitis of both eyes, unspecified conjunctivitis type    Class 2 severe obesity due to excess calories with serious comorbidity in adult (H)    High risk HPV infection    Post-menopause bleeding    MIKE (obstructive sleep apnea)      Diabetes: yes, not testing every   HbA1c:  No results found for: \"HGBA1C\"    Progress over past month: states she is feeling good, but feeling a little anxious, started ozempic three weeks ago- feeling more hungry, thinking about food more    Diet Recall/Time  Breakfast: cup of coffee this " morning, this past week has had toast, little bit of peanut butter, eggs  Lunch: tuna, spinach salad, salmon, steak- hot dog and popcorn, wheat crackers with avocado and hummus, cheese  Dinner: catfish with salad    Typical Snacks: popcorn and chips    Beverages  Water- with lemon and lime and little ginger ale      PES statement:     Obesity related to excessive energy intake as evidenced by Intake of high caloric density foods/beverages (juice, soda, alcohol) at meals and/or snacks; large portions; frequent grazing; Estimated intake that exceeds estimated daily energy intake; Binge eating patterns; Frequent excessive fast food or restaurant intake; and BMI 40.57     Intervention    Nutrition Education:   Provided general overview of diet and lifestyle modifications needed to be a deemed a safe candidate for bariatric surgery.   Educated patient on how to read a food label: choosing foods with than 10 grams fat and 10 grams sugar per serving to avoid dumping syndrome.    Food/Nutrient Delivery:  Educated patient on eating three meals, with cutting out snacking.  Bariatric Plate: Patient and I discussed the importance of including a lean protein source (20-30 grams/meal), vegetables (included at lunch and dinner), one serving (15g) of carbohydrate, and limited added fat (1 tb/day) at each meal.   Educated patient on using a protein powder drink as a meal replacement and/or supplement after bariatric surgery.   Discussed importance of adequate hydration after surgery, with goal of at least 64 oz of fluids/day.  Addressed avoiding all carbonated, caffeinated and sweetened drinks to prepare for bariatric surgery.     Instructions/Goals:     Continue implementing bariatric surgery lifestyle modifications.  Aim for 60-80g protein per day  Increase fiber  Sugar-free gingerale    Handouts Provided:   Protein and fiber lists  Label reading  Protein supplement list    Monitor/Evaluation:  Pt. s target weight:  no gain from  initial visit, pt. verbalized understanding.     Plan for next visit:     Review Bariatric plate       Video-Visit Details    Type of service:  Video Visit    Video Start Time (time video started): 10:00a    Video End Time (time video stopped): 10:30a    Originating Location (pt. Location): Home      Distant Location (provider location):  Off-site    Mode of Communication:  Video Conference via [facility]    Physician has received verbal consent for a Video Visit from the patient? Yes      Manju Lucia RD

## 2025-07-15 ENCOUNTER — ANCILLARY PROCEDURE (OUTPATIENT)
Dept: MAMMOGRAPHY | Facility: CLINIC | Age: 50
End: 2025-07-15
Attending: FAMILY MEDICINE
Payer: COMMERCIAL

## 2025-07-15 ENCOUNTER — TELEPHONE (OUTPATIENT)
Dept: SURGERY | Facility: CLINIC | Age: 50
End: 2025-07-15

## 2025-07-15 DIAGNOSIS — Z12.31 VISIT FOR SCREENING MAMMOGRAM: ICD-10-CM

## 2025-07-15 PROCEDURE — 77067 SCR MAMMO BI INCL CAD: CPT | Mod: TC | Performed by: RADIOLOGY

## 2025-07-15 PROCEDURE — 77063 BREAST TOMOSYNTHESIS BI: CPT | Mod: TC | Performed by: RADIOLOGY

## 2025-07-15 NOTE — TELEPHONE ENCOUNTER
Prior Authorization Retail Medication Request    Medication/Dose: Ozempic 0.25 mg or 0.5 mg (2mg/3ml) pen injector  New/renewal/insurance change PA/secondary ins. PA: New  Rationale:  Type 2 DM    Insurance   Primary: [employer]  Insurance ID:  [id number]    Pharmacy Information (if different than what is on RX)  Name:  [facility]  Phone:  [phone]  Fax: [phone]    Clinic Information  Preferred routing pool for dept communication: Bariatric Surgery Support [facility] [place]

## 2025-07-16 ENCOUNTER — VIRTUAL VISIT (OUTPATIENT)
Dept: SURGERY | Facility: CLINIC | Age: 50
End: 2025-07-16
Payer: COMMERCIAL

## 2025-07-16 DIAGNOSIS — E66.01 MORBID OBESITY (H): Primary | ICD-10-CM

## 2025-07-16 NOTE — PROGRESS NOTES
Virtual Visit Details    Type of service:  Video Visit     Originating Location (pt. Location): Home    Distant Location (provider location):  Off-site  Platform used for Video Visit: [employer]    Start Time: 7:25 AM  End Time: 7:50 AM    Recommendations: This patient is only 70% certain she wants to pursue bariatric surgery at this time. She admits that she has having difficulty wrapping her head around the idea of eating such small portion sizes. Thus, she cannot yet be deemed a viable candidate for bariatric surgery from a psychosocial perspective. The final decision to follow through with bariatric surgery should be done in collaboration with [facility] Management [facility] staff.    Current Treatment Plan and Aftercare Plan: Once she is 100% certain she wants to proceed with bariatric surgery and her dietitian feels she is in a good position, she may follow up with this clinician to determine readiness for surgery at that time.      Special Needs, Personal Barriers, Potential Risks and Strengths: Monitor this patient's ability to avoid mindless eating and has a good support system in place. She also will need to be 100% certain she wants to proceed with surgery.

## 2025-07-19 NOTE — TELEPHONE ENCOUNTER
PA Initiation    Medication: OZEMPIC (0.25 OR 0.5 MG/DOSE) 2 MG/3ML SC SOPN  Insurance Company: [employer] [place] - Phone [phone] Fax [phone]  Pharmacy Filling the Rx: [id number] [id number] [facility]  [address]  Filling Pharmacy Phone: [phone]  Filling Pharmacy Fax: [phone]  Start Date: 7/19/2025

## 2025-07-21 NOTE — TELEPHONE ENCOUNTER
Prior Authorization Not Needed per Insurance    Medication: OZEMPIC (0.25 OR 0.5 MG/DOSE) 2 MG/3ML SC SOPN  Insurance Company: [employer] [place] - Phone [phone] Fax [phone]  Expected CoPay: $    Pharmacy Filling the Rx: [id number] [address] [place] AT [facility] [address]  Pharmacy Notified: YES  Patient Notified: **Instructed pharmacy to notify patient when script is ready to /ship.**

## 2025-08-23 ENCOUNTER — HEALTH MAINTENANCE LETTER (OUTPATIENT)
Age: 50
End: 2025-08-23

## 2025-08-27 ENCOUNTER — OFFICE VISIT (OUTPATIENT)
Dept: OPHTHALMOLOGY | Facility: CLINIC | Age: 50
End: 2025-08-27
Attending: OPTOMETRIST
Payer: COMMERCIAL

## 2025-08-27 DIAGNOSIS — H02.402 PTOSIS OF EYELID, LEFT: ICD-10-CM

## 2025-08-27 ASSESSMENT — VISUAL ACUITY
OD_SC: 20/40
OS_SC: 20/50
METHOD: SNELLEN - LINEAR
OS_SC+: -1

## 2025-08-27 ASSESSMENT — EXTERNAL EXAM - RIGHT EYE: OD_EXAM: NORMAL

## 2025-08-27 ASSESSMENT — TONOMETRY
OD_IOP_MMHG: 15
IOP_METHOD: ICARE
OS_IOP_MMHG: 14

## 2025-08-27 ASSESSMENT — CONF VISUAL FIELD: COMMENTS: TANGENT VISUAL FIELD PERFORMED TODAY.

## 2025-08-27 ASSESSMENT — EXTERNAL EXAM - LEFT EYE: OS_EXAM: NORMAL

## (undated) DEVICE — LIFTER SURGICAL ASCENDO SUBMUCOSAL LIFT AGENT BX00712934

## (undated) DEVICE — SOL WATER IRRIG 1000ML BOTTLE 07139-09

## (undated) RX ORDER — FENTANYL CITRATE 50 UG/ML
INJECTION, SOLUTION INTRAMUSCULAR; INTRAVENOUS
Status: DISPENSED
Start: 2025-06-27

## (undated) RX ORDER — ALBUTEROL SULFATE 0.83 MG/ML
SOLUTION RESPIRATORY (INHALATION)
Status: DISPENSED
Start: 2021-06-16

## (undated) RX ORDER — LIDOCAINE HYDROCHLORIDE 20 MG/ML
JELLY TOPICAL
Status: DISPENSED
Start: 2025-06-27

## (undated) RX ORDER — IODINE AND POTASSIUM IODIDE 50; 100 MG/ML; MG/ML
LIQUID ORAL
Status: DISPENSED
Start: 2024-07-19

## (undated) RX ORDER — IBUPROFEN 200 MG
TABLET ORAL
Status: DISPENSED
Start: 2024-07-19

## (undated) RX ORDER — FERRIC SUBSULFATE 0.21 G/G
LIQUID TOPICAL
Status: DISPENSED
Start: 2024-07-19